# Patient Record
Sex: FEMALE | Race: WHITE | Employment: OTHER | URBAN - METROPOLITAN AREA
[De-identification: names, ages, dates, MRNs, and addresses within clinical notes are randomized per-mention and may not be internally consistent; named-entity substitution may affect disease eponyms.]

---

## 2017-05-09 PROBLEM — Z45.02 AICD AT END OF BATTERY LIFE: Status: ACTIVE | Noted: 2017-05-09

## 2017-05-09 PROBLEM — Z00.6 PATIENT IN CLINICAL RESEARCH STUDY: Status: ACTIVE | Noted: 2017-05-09

## 2018-04-05 ENCOUNTER — NURSE ONLY (OUTPATIENT)
Dept: NON INVASIVE DIAGNOSTICS | Age: 83
End: 2018-04-05

## 2018-04-05 ENCOUNTER — TELEPHONE (OUTPATIENT)
Dept: NON INVASIVE DIAGNOSTICS | Age: 83
End: 2018-04-05

## 2018-04-05 DIAGNOSIS — Z95.810 BIVENTRICULAR IMPLANTABLE CARDIOVERTER-DEFIBRILLATOR IN SITU: Primary | ICD-10-CM

## 2018-06-04 ENCOUNTER — OFFICE VISIT (OUTPATIENT)
Dept: NON INVASIVE DIAGNOSTICS | Age: 83
End: 2018-06-04
Payer: MEDICARE

## 2018-06-04 VITALS
RESPIRATION RATE: 16 BRPM | DIASTOLIC BLOOD PRESSURE: 74 MMHG | HEART RATE: 74 BPM | HEIGHT: 60 IN | SYSTOLIC BLOOD PRESSURE: 122 MMHG | BODY MASS INDEX: 32.9 KG/M2 | WEIGHT: 167.6 LBS

## 2018-06-04 DIAGNOSIS — Z95.810 BIVENTRICULAR IMPLANTABLE CARDIOVERTER-DEFIBRILLATOR IN SITU: Primary | ICD-10-CM

## 2018-06-04 DIAGNOSIS — Z00.6 PATIENT IN CLINICAL RESEARCH STUDY: ICD-10-CM

## 2018-06-04 PROBLEM — Z45.02 AICD AT END OF BATTERY LIFE: Status: RESOLVED | Noted: 2017-05-09 | Resolved: 2018-06-04

## 2018-06-04 PROCEDURE — 93290 INTERROG DEV EVAL ICPMS IP: CPT | Performed by: NURSE PRACTITIONER

## 2018-06-04 PROCEDURE — 99213 OFFICE O/P EST LOW 20 MIN: CPT | Performed by: NURSE PRACTITIONER

## 2018-06-04 PROCEDURE — 93284 PRGRMG EVAL IMPLANTABLE DFB: CPT | Performed by: NURSE PRACTITIONER

## 2018-09-11 ENCOUNTER — TELEPHONE (OUTPATIENT)
Dept: NON INVASIVE DIAGNOSTICS | Age: 83
End: 2018-09-11

## 2018-09-11 ENCOUNTER — NURSE ONLY (OUTPATIENT)
Dept: NON INVASIVE DIAGNOSTICS | Age: 83
End: 2018-09-11
Payer: MEDICARE

## 2018-09-11 DIAGNOSIS — Z95.810 BIVENTRICULAR IMPLANTABLE CARDIOVERTER-DEFIBRILLATOR IN SITU: Primary | ICD-10-CM

## 2018-09-11 DIAGNOSIS — I42.8 CARDIOMYOPATHY, NONISCHEMIC (HCC): ICD-10-CM

## 2018-09-11 PROCEDURE — 93295 DEV INTERROG REMOTE 1/2/MLT: CPT | Performed by: INTERNAL MEDICINE

## 2018-09-11 PROCEDURE — 93297 REM INTERROG DEV EVAL ICPMS: CPT | Performed by: INTERNAL MEDICINE

## 2018-09-11 PROCEDURE — 93296 REM INTERROG EVL PM/IDS: CPT | Performed by: INTERNAL MEDICINE

## 2018-11-07 LAB
CREATININE: 1 MG/DL
POTASSIUM (K+): 3.9

## 2018-12-04 NOTE — PROGRESS NOTES
regurgitation   No mitral stenosis      Tricuspid Valve   Trace tricuspid regurgitation.    RVSP is 48 mmHg.      Aortic Valve   Focal calcification of the aortic valve   Trileaflet aortic valve   No hemodynamically significant aortic stenosis is present.   Trace aortic regurgitation      Pulmonic Valve   The pulmonic valve was not well visualized.      Pericardial Effusion   No evidence of pericardial effusion.      Aorta   Aortic root dimension within normal limits.   Miscellaneous   Normal Inferior Vena Cava diameter and respiratory variation      Conclusions      Summary   Normal left ventricle size and systolic function   Stage I diastolic dysfunction   Trace mitral regurgitation   Trace aortic regurgitation   RVSP is 48 mmHg.      Signature      ----------------------------------------------------------------   Electronically signed by Kamlesh Pickett DO (Interpreting   physician) on 01/11/2017 03:50 PM   ----------------------------------------------------------------     M-Mode/2D Measurements & Calculations      LV Diastolic     LV Systolic Dimension: 3.4 cm  AV Cusp Separation: 1.8   Dimension: 4.8   LV Volume Diastolic: 257 ml    cmAO Root Dimension: 3.2   cm               LV Volume Systolic: 41.2 ml    cm   LV FS:29.2 %     LV EDV/LV EDV Index: 108 IG/89   LV PW Diastolic: AS/P^0HW ESV/LV ESV Index:   0.6 cm           47.9 ml/28ml/ m^2   LV PW Systolic:  EF Calculated: 55.7 %          RV Diastolic Dimension: 2   3.4 cm           LV Mass Index: 51 l/min*m^2    cm   Septum   Diastolic: 0.6                                  LA/Aorta: 1.22   cm               LVOT: 2 cm                     Ascending Aorta: 2.6 cm   Septum Systolic:                                LA volume/Index: 47.4 ml   1.1 cm                                          /28ml/m^2                                                   RA Area: 9.7 cm^2   LV Mass: 88.3 g                                                   IVC Expiration: 1.1

## 2018-12-05 ENCOUNTER — OFFICE VISIT (OUTPATIENT)
Dept: NON INVASIVE DIAGNOSTICS | Age: 83
End: 2018-12-05
Payer: MEDICARE

## 2018-12-05 VITALS
HEIGHT: 60 IN | BODY MASS INDEX: 32.39 KG/M2 | WEIGHT: 165 LBS | SYSTOLIC BLOOD PRESSURE: 110 MMHG | DIASTOLIC BLOOD PRESSURE: 64 MMHG | RESPIRATION RATE: 18 BRPM

## 2018-12-05 DIAGNOSIS — Z95.810 BIVENTRICULAR IMPLANTABLE CARDIOVERTER-DEFIBRILLATOR IN SITU: Primary | ICD-10-CM

## 2018-12-05 PROCEDURE — 99214 OFFICE O/P EST MOD 30 MIN: CPT | Performed by: INTERNAL MEDICINE

## 2018-12-05 PROCEDURE — 93284 PRGRMG EVAL IMPLANTABLE DFB: CPT | Performed by: INTERNAL MEDICINE

## 2018-12-05 RX ORDER — LOSARTAN POTASSIUM AND HYDROCHLOROTHIAZIDE 12.5; 5 MG/1; MG/1
1 TABLET ORAL DAILY
COMMUNITY
End: 2019-05-07 | Stop reason: SDUPTHER

## 2018-12-18 ENCOUNTER — NURSE ONLY (OUTPATIENT)
Dept: NON INVASIVE DIAGNOSTICS | Age: 83
End: 2018-12-18
Payer: MEDICARE

## 2018-12-18 DIAGNOSIS — Z95.810 BIVENTRICULAR IMPLANTABLE CARDIOVERTER-DEFIBRILLATOR IN SITU: Primary | ICD-10-CM

## 2018-12-18 DIAGNOSIS — I42.8 CARDIOMYOPATHY, NONISCHEMIC (HCC): ICD-10-CM

## 2018-12-18 PROCEDURE — 93297 REM INTERROG DEV EVAL ICPMS: CPT | Performed by: INTERNAL MEDICINE

## 2018-12-18 PROCEDURE — 93295 DEV INTERROG REMOTE 1/2/MLT: CPT | Performed by: INTERNAL MEDICINE

## 2018-12-18 PROCEDURE — 93296 REM INTERROG EVL PM/IDS: CPT | Performed by: INTERNAL MEDICINE

## 2019-01-08 ENCOUNTER — TELEPHONE (OUTPATIENT)
Dept: NON INVASIVE DIAGNOSTICS | Age: 84
End: 2019-01-08

## 2019-01-23 ENCOUNTER — OFFICE VISIT (OUTPATIENT)
Dept: CARDIOLOGY CLINIC | Age: 84
End: 2019-01-23
Payer: MEDICARE

## 2019-01-23 VITALS
HEIGHT: 60 IN | BODY MASS INDEX: 32.53 KG/M2 | RESPIRATION RATE: 16 BRPM | WEIGHT: 165.7 LBS | DIASTOLIC BLOOD PRESSURE: 68 MMHG | SYSTOLIC BLOOD PRESSURE: 118 MMHG | HEART RATE: 73 BPM

## 2019-01-23 DIAGNOSIS — I42.8 CARDIOMYOPATHY, NONISCHEMIC (HCC): Primary | ICD-10-CM

## 2019-01-23 PROCEDURE — 93000 ELECTROCARDIOGRAM COMPLETE: CPT | Performed by: INTERNAL MEDICINE

## 2019-01-23 PROCEDURE — 99213 OFFICE O/P EST LOW 20 MIN: CPT | Performed by: INTERNAL MEDICINE

## 2019-01-23 RX ORDER — BRIMONIDINE TARTRATE, TIMOLOL MALEATE 2; 5 MG/ML; MG/ML
1 SOLUTION/ DROPS OPHTHALMIC EVERY 12 HOURS
COMMUNITY
End: 2021-03-26

## 2019-03-19 ENCOUNTER — NURSE ONLY (OUTPATIENT)
Dept: NON INVASIVE DIAGNOSTICS | Age: 84
End: 2019-03-19
Payer: MEDICARE

## 2019-03-19 DIAGNOSIS — I42.8 CARDIOMYOPATHY, NONISCHEMIC (HCC): ICD-10-CM

## 2019-03-19 DIAGNOSIS — Z95.810 BIVENTRICULAR IMPLANTABLE CARDIOVERTER-DEFIBRILLATOR IN SITU: Primary | ICD-10-CM

## 2019-03-19 PROCEDURE — 93296 REM INTERROG EVL PM/IDS: CPT | Performed by: INTERNAL MEDICINE

## 2019-03-19 PROCEDURE — 93295 DEV INTERROG REMOTE 1/2/MLT: CPT | Performed by: INTERNAL MEDICINE

## 2019-03-19 PROCEDURE — 93297 REM INTERROG DEV EVAL ICPMS: CPT | Performed by: INTERNAL MEDICINE

## 2019-03-26 ENCOUNTER — TELEPHONE (OUTPATIENT)
Dept: NON INVASIVE DIAGNOSTICS | Age: 84
End: 2019-03-26

## 2019-05-07 ENCOUNTER — OFFICE VISIT (OUTPATIENT)
Dept: FAMILY MEDICINE CLINIC | Age: 84
End: 2019-05-07
Payer: MEDICARE

## 2019-05-07 ENCOUNTER — HOSPITAL ENCOUNTER (OUTPATIENT)
Age: 84
Discharge: HOME | End: 2019-05-09
Payer: MEDICARE

## 2019-05-07 VITALS
SYSTOLIC BLOOD PRESSURE: 112 MMHG | HEART RATE: 69 BPM | OXYGEN SATURATION: 97 % | BODY MASS INDEX: 32.79 KG/M2 | DIASTOLIC BLOOD PRESSURE: 70 MMHG | WEIGHT: 167 LBS | HEIGHT: 60 IN

## 2019-05-07 DIAGNOSIS — G47.01 INSOMNIA DUE TO MEDICAL CONDITION: ICD-10-CM

## 2019-05-07 DIAGNOSIS — J45.40 MODERATE PERSISTENT ASTHMA, UNSPECIFIED WHETHER COMPLICATED: ICD-10-CM

## 2019-05-07 DIAGNOSIS — I42.8 CARDIOMYOPATHY, NONISCHEMIC (HCC): Primary | ICD-10-CM

## 2019-05-07 DIAGNOSIS — E13.9 DIABETES 1.5, MANAGED AS TYPE 2 (HCC): ICD-10-CM

## 2019-05-07 DIAGNOSIS — Z95.810 BIVENTRICULAR IMPLANTABLE CARDIOVERTER-DEFIBRILLATOR IN SITU: ICD-10-CM

## 2019-05-07 DIAGNOSIS — I42.8 CARDIOMYOPATHY, NONISCHEMIC (HCC): ICD-10-CM

## 2019-05-07 LAB
ALBUMIN SERPL-MCNC: 4.2 G/DL (ref 3.5–5.2)
ALP BLD-CCNC: 82 U/L (ref 35–104)
ALT SERPL-CCNC: 10 U/L (ref 0–32)
ANION GAP SERPL CALCULATED.3IONS-SCNC: 14 MMOL/L (ref 7–16)
AST SERPL-CCNC: 20 U/L (ref 0–31)
BASOPHILS ABSOLUTE: 0.04 E9/L (ref 0–0.2)
BASOPHILS RELATIVE PERCENT: 0.7 % (ref 0–2)
BILIRUB SERPL-MCNC: 0.3 MG/DL (ref 0–1.2)
BUN BLDV-MCNC: 31 MG/DL (ref 8–23)
CALCIUM SERPL-MCNC: 10.4 MG/DL (ref 8.6–10.2)
CHLORIDE BLD-SCNC: 103 MMOL/L (ref 98–107)
CHOLESTEROL, TOTAL: 224 MG/DL (ref 0–199)
CO2: 18 MMOL/L (ref 22–29)
CREAT SERPL-MCNC: 1.2 MG/DL (ref 0.5–1)
EOSINOPHILS ABSOLUTE: 0.2 E9/L (ref 0.05–0.5)
EOSINOPHILS RELATIVE PERCENT: 3.3 % (ref 0–6)
GFR AFRICAN AMERICAN: 51
GFR NON-AFRICAN AMERICAN: 43 ML/MIN/1.73
GLUCOSE BLD-MCNC: 114 MG/DL (ref 74–99)
HBA1C MFR BLD: 6 % (ref 4–5.6)
HCT VFR BLD CALC: 38.5 % (ref 34–48)
HDLC SERPL-MCNC: 40 MG/DL
HEMOGLOBIN: 12.4 G/DL (ref 11.5–15.5)
IMMATURE GRANULOCYTES #: 0.02 E9/L
IMMATURE GRANULOCYTES %: 0.3 % (ref 0–5)
LDL CHOLESTEROL CALCULATED: 131 MG/DL (ref 0–99)
LYMPHOCYTES ABSOLUTE: 2.23 E9/L (ref 1.5–4)
LYMPHOCYTES RELATIVE PERCENT: 37.3 % (ref 20–42)
MCH RBC QN AUTO: 32 PG (ref 26–35)
MCHC RBC AUTO-ENTMCNC: 32.2 % (ref 32–34.5)
MCV RBC AUTO: 99.5 FL (ref 80–99.9)
MONOCYTES ABSOLUTE: 0.51 E9/L (ref 0.1–0.95)
MONOCYTES RELATIVE PERCENT: 8.5 % (ref 2–12)
NEUTROPHILS ABSOLUTE: 2.98 E9/L (ref 1.8–7.3)
NEUTROPHILS RELATIVE PERCENT: 49.9 % (ref 43–80)
PDW BLD-RTO: 12.7 FL (ref 11.5–15)
PLATELET # BLD: 254 E9/L (ref 130–450)
PMV BLD AUTO: 10.3 FL (ref 7–12)
POTASSIUM SERPL-SCNC: 5.2 MMOL/L (ref 3.5–5)
RBC # BLD: 3.87 E12/L (ref 3.5–5.5)
SODIUM BLD-SCNC: 135 MMOL/L (ref 132–146)
TOTAL PROTEIN: 7 G/DL (ref 6.4–8.3)
TRIGL SERPL-MCNC: 267 MG/DL (ref 0–149)
TSH SERPL DL<=0.05 MIU/L-ACNC: 2.47 UIU/ML (ref 0.27–4.2)
VLDLC SERPL CALC-MCNC: 53 MG/DL
WBC # BLD: 6 E9/L (ref 4.5–11.5)

## 2019-05-07 PROCEDURE — 82570 ASSAY OF URINE CREATININE: CPT

## 2019-05-07 PROCEDURE — 99214 OFFICE O/P EST MOD 30 MIN: CPT | Performed by: FAMILY MEDICINE

## 2019-05-07 PROCEDURE — 84443 ASSAY THYROID STIM HORMONE: CPT

## 2019-05-07 PROCEDURE — 36415 COLL VENOUS BLD VENIPUNCTURE: CPT

## 2019-05-07 PROCEDURE — 82044 UR ALBUMIN SEMIQUANTITATIVE: CPT

## 2019-05-07 PROCEDURE — 80061 LIPID PANEL: CPT

## 2019-05-07 PROCEDURE — 85025 COMPLETE CBC W/AUTO DIFF WBC: CPT

## 2019-05-07 PROCEDURE — 83036 HEMOGLOBIN GLYCOSYLATED A1C: CPT

## 2019-05-07 PROCEDURE — 80053 COMPREHEN METABOLIC PANEL: CPT

## 2019-05-07 RX ORDER — LOSARTAN POTASSIUM AND HYDROCHLOROTHIAZIDE 12.5; 5 MG/1; MG/1
1 TABLET ORAL DAILY
Qty: 90 TABLET | Refills: 1 | Status: SHIPPED | OUTPATIENT
Start: 2019-05-07 | End: 2019-11-05 | Stop reason: SDUPTHER

## 2019-05-07 RX ORDER — MIRTAZAPINE 7.5 MG/1
7.5 TABLET, FILM COATED ORAL NIGHTLY
Qty: 30 TABLET | Refills: 5 | Status: SHIPPED | OUTPATIENT
Start: 2019-05-07 | End: 2019-11-05

## 2019-05-07 RX ORDER — POTASSIUM CHLORIDE 750 MG/1
10 TABLET, EXTENDED RELEASE ORAL 3 TIMES DAILY
Qty: 90 TABLET | Refills: 5 | Status: SHIPPED
Start: 2019-05-07 | End: 2020-06-05 | Stop reason: SDUPTHER

## 2019-05-07 RX ORDER — NEBIVOLOL 2.5 MG/1
2.5 TABLET ORAL DAILY
Qty: 180 TABLET | Refills: 1 | Status: SHIPPED | OUTPATIENT
Start: 2019-05-07 | End: 2019-11-05 | Stop reason: SDUPTHER

## 2019-05-07 RX ORDER — GLIPIZIDE 10 MG/1
10 TABLET ORAL
Qty: 180 TABLET | Refills: 1 | Status: SHIPPED
Start: 2019-05-07 | End: 2020-05-20 | Stop reason: SDUPTHER

## 2019-05-07 RX ORDER — POTASSIUM CHLORIDE 750 MG/1
10 TABLET, EXTENDED RELEASE ORAL 3 TIMES DAILY
COMMUNITY
End: 2019-05-07 | Stop reason: SDUPTHER

## 2019-05-07 ASSESSMENT — PATIENT HEALTH QUESTIONNAIRE - PHQ9
2. FEELING DOWN, DEPRESSED OR HOPELESS: 0
1. LITTLE INTEREST OR PLEASURE IN DOING THINGS: 0
SUM OF ALL RESPONSES TO PHQ9 QUESTIONS 1 & 2: 0
SUM OF ALL RESPONSES TO PHQ QUESTIONS 1-9: 0
SUM OF ALL RESPONSES TO PHQ QUESTIONS 1-9: 0

## 2019-05-07 ASSESSMENT — ENCOUNTER SYMPTOMS
WHEEZING: 0
SHORTNESS OF BREATH: 0
CHEST TIGHTNESS: 0
COUGH: 0
PHOTOPHOBIA: 0

## 2019-05-07 NOTE — PROGRESS NOTES
hours, Disp: , Rfl:     ONETOUCH VERIO strip, , Disp: , Rfl:     Cholecalciferol (VITAMIN D-3) 1000 units CAPS, Take by mouth daily, Disp: , Rfl:     acetaminophen (TYLENOL ARTHRITIS PAIN) 650 MG extended release tablet, Take 650 mg by mouth every 8 hours as needed for Pain, Disp: , Rfl:     brinzolamide (AZOPT) 1 % ophthalmic suspension, Place 1 drop into both eyes 2 times daily, Disp: , Rfl:     bimatoprost (LUMIGAN) 0.01 % SOLN ophthalmic drops, Place 1 drop into both eyes nightly, Disp: , Rfl:     ibuprofen (ADVIL;MOTRIN) 800 MG tablet, Take 800 mg by mouth every 8 hours as needed , Disp: , Rfl:     Cyanocobalamin (VITAMIN B 12 PO), Take 1,000 mcg by mouth , Disp: , Rfl:     Biotin 1000 MCG TABS, Take 5,000 mcg by mouth daily , Disp: , Rfl:     vitamin E 400 UNIT capsule, Take 400 Units by mouth daily. , Disp: , Rfl:     Netarsudil Dimesylate (RHOPRESSA OP), Apply to eye, Disp: , Rfl:     brimonidine (ALPHAGAN P) 0.15 % ophthalmic solution, Place 1 drop into both eyes 2 times daily, Disp: , Rfl:   Allergies   Allergen Reactions    Codeine     Statins        Past Medical History:   Diagnosis Date    Arthritis     Asthma     CAD (coronary artery disease)     Diabetes mellitus ([address]     GERD (gastroesophageal reflux disease)     Hyperlipidemia     Hypertension     ICD (implantable cardiac defibrillator) battery depletion     Renell Artur (biventricular)    LBBB (left bundle branch block)     Nonischemic cardiomyopathy ([address]      Patient Active Problem List   Diagnosis    Cardiomyopathy, nonischemic (HCC)    Bundle branch block, left    Asthma    Biventricular implantable cardioverter-defibrillator in situ    CAD (coronary artery disease)    Diabetes 1.5, managed as type 2 ([address]    Patient in clinical research study    Insomnia due to medical condition     Past Surgical History:   Procedure Laterality Date    CARDIAC DEFIBRILLATOR PLACEMENT  09/01/2011    CARDIAC DEFIBRILLATOR PLACEMENT Left 05/09/2017    Bi-V gen change, [employer], WRAP-IT study,     CARPAL TUNNEL RELEASE      right arm    CATARACT REMOVAL WITH IMPLANT  2010    DIAGNOSTIC CARDIAC CATH LAB PROCEDURE  3/23/10    Dr. Mayur Robles    left eye     Family History   Problem Relation Age of Onset    Cancer Father      Social History     Tobacco History     Smoking Status  Never Smoker    Smokeless Tobacco Use  Never Used          Alcohol History     Alcohol Use Status  No          Drug Use     Drug Use Status  No          Sexual Activity     Sexually Active  Not Asked                    OBJECTIVE  Vitals:    05/07/19 0930   BP: 112/70   Pulse: 69   SpO2: 97%   Body mass index is 32.61 kg/m². EXAM   Physical Exam   Constitutional: She is oriented to person, place, and time. She appears well-developed. obese   HENT:   Head: Normocephalic. Right Ear: External ear normal.   Hearing aids missing left one   Eyes: Pupils are equal, round, and reactive to light. Conjunctivae are normal.   Neck: Normal range of motion. No JVD present. No tracheal deviation present. No thyromegaly present. Cardiovascular: Normal rate, regular rhythm, normal heart sounds and intact distal pulses. Pacemaker/defibrillator   Pulmonary/Chest: Effort normal. She has no wheezes. She has no rales. Abdominal: Soft. Bowel sounds are normal. There is no tenderness. There is no guarding. Musculoskeletal: Normal range of motion. She exhibits no deformity. OA right knee in particular   Neurological: She is alert and oriented to person, place, and time. A sensory deficit is present. No cranial nerve deficit. She exhibits normal muscle tone. Skin: Skin is warm. Capillary refill takes less than 2 seconds. No rash noted. Psychiatric: She has a normal mood and affect. Her behavior is normal. Thought content normal.       [place] was seen today for check-up and hypertension.     Diagnoses and all orders for this visit:    Cardiomyopathy, nonischemic (HCC)  -     losartan-hydrochlorothiazide (HYZAAR) 50-12.5 MG per tablet; Take 1 tablet by mouth daily  -     nebivolol (BYSTOLIC) 2.5 MG tablet; Take 1 tablet by mouth daily In addition to 5 mg for total of 7.5 mg  -     glipiZIDE (GLUCOTROL) 10 MG tablet; Take 1 tablet by mouth 2 times daily (before meals)  -     potassium chloride (KLOR-CON M) 10 MEQ extended release tablet; Take 1 tablet by mouth 3 times daily 3 tablets by mouth every day  -     metFORMIN (GLUCOPHAGE) 500 MG tablet; Take 1 tablet by mouth 2 times daily (with meals) One tab qam and two tab qpm  (Doesn't always take 2 in pm)  -     CBC Auto Differential; Future  -     Comprehensive Metabolic Panel; Future  -     Hemoglobin A1C; Future  -     Lipid Panel; Future  -     TSH; Future  -     Microalbumin / Creatinine Urine Ratio; Future    Moderate persistent asthma, unspecified whether complicated  Well compensated, no changes made    Diabetes 1.5, managed as type 2 (HCC)  -     losartan-hydrochlorothiazide (HYZAAR) 50-12.5 MG per tablet; Take 1 tablet by mouth daily  -     nebivolol (BYSTOLIC) 2.5 MG tablet; Take 1 tablet by mouth daily In addition to 5 mg for total of 7.5 mg  -     glipiZIDE (GLUCOTROL) 10 MG tablet; Take 1 tablet by mouth 2 times daily (before meals)  -     potassium chloride (KLOR-CON M) 10 MEQ extended release tablet; Take 1 tablet by mouth 3 times daily 3 tablets by mouth every day  -     metFORMIN (GLUCOPHAGE) 500 MG tablet; Take 1 tablet by mouth 2 times daily (with meals) One tab qam and two tab qpm  (Doesn't always take 2 in pm)  -     CBC Auto Differential; Future  -     Comprehensive Metabolic Panel; Future  -     Hemoglobin A1C; Future  -     Lipid Panel; Future  -     TSH; Future  -     Microalbumin / Creatinine Urine Ratio; Future  Complete BW ordered. Sees Dr. Flakita Garay for eyes, and has Podiatrist she sees regularly.  Encouraged to get diabetic shoes    Biventricular implantable

## 2019-05-07 NOTE — PATIENT INSTRUCTIONS
Take zantac with evening meal for 1 mos then as need. Elevate head on wedge or 2 pillows.  Mirtazapine at hs 7.5 mg x 1 week then prn

## 2019-05-08 LAB
CREATININE URINE: 90 MG/DL (ref 29–226)
MICROALBUMIN UR-MCNC: <12 MG/L
MICROALBUMIN/CREAT UR-RTO: ABNORMAL (ref 0–30)

## 2019-05-24 ENCOUNTER — OFFICE VISIT (OUTPATIENT)
Dept: FAMILY MEDICINE CLINIC | Age: 84
End: 2019-05-24
Payer: MEDICARE

## 2019-05-24 ENCOUNTER — HOSPITAL ENCOUNTER (OUTPATIENT)
Age: 84
Discharge: HOME | End: 2019-05-26
Payer: MEDICARE

## 2019-05-24 VITALS
OXYGEN SATURATION: 98 % | DIASTOLIC BLOOD PRESSURE: 70 MMHG | SYSTOLIC BLOOD PRESSURE: 130 MMHG | BODY MASS INDEX: 31.64 KG/M2 | WEIGHT: 162 LBS | TEMPERATURE: 97.8 F | HEART RATE: 80 BPM

## 2019-05-24 DIAGNOSIS — R30.0 DYSURIA: Primary | ICD-10-CM

## 2019-05-24 DIAGNOSIS — N30.00 ACUTE CYSTITIS WITHOUT HEMATURIA: ICD-10-CM

## 2019-05-24 LAB
BILIRUBIN, POC: ABNORMAL
BLOOD URINE, POC: ABNORMAL
CLARITY, POC: ABNORMAL
COLOR, POC: YELLOW
GLUCOSE URINE, POC: ABNORMAL
KETONES, POC: ABNORMAL
LEUKOCYTE EST, POC: ABNORMAL
NITRITE, POC: POSITIVE
PH, POC: 5.5
PROTEIN, POC: 100
SPECIFIC GRAVITY, POC: 1.03
UROBILINOGEN, POC: 0.2

## 2019-05-24 PROCEDURE — 87186 SC STD MICRODIL/AGAR DIL: CPT

## 2019-05-24 PROCEDURE — 81002 URINALYSIS NONAUTO W/O SCOPE: CPT | Performed by: FAMILY MEDICINE

## 2019-05-24 PROCEDURE — 99213 OFFICE O/P EST LOW 20 MIN: CPT | Performed by: FAMILY MEDICINE

## 2019-05-24 PROCEDURE — 87077 CULTURE AEROBIC IDENTIFY: CPT

## 2019-05-24 PROCEDURE — 87088 URINE BACTERIA CULTURE: CPT

## 2019-05-24 RX ORDER — AMOXICILLIN 875 MG/1
875 TABLET, COATED ORAL 2 TIMES DAILY
Qty: 20 TABLET | Refills: 0 | Status: SHIPPED | OUTPATIENT
Start: 2019-05-24 | End: 2019-06-03

## 2019-05-24 NOTE — PROGRESS NOTES
5/24/19    Name: Jose Pinto  ARK:[birth year]   Sex:female   Age:86 y.o. Subjective:  Chief Complaint   Patient presents with    Dysuria     1 week with urgency and incontinence      Patient present with urinary complaints    Started Monday but she waited to see what would happen  Tried drinking more water  Took azo mon , Tuesday and wed and it helped a little    But yesterday and today still frequency and urgency, cannot always make it to the bathroom  Burns when she is done peeing and chills a well        Urinary Frequency    This is a new problem. The current episode started in the past 7 days. The problem occurs intermittently. The problem has been gradually worsening. The quality of the pain is described as aching. The pain is at a severity of 3/10. The pain is mild. There has been no fever. She is not sexually active. There is no history of pyelonephritis. Associated symptoms include chills, frequency, hesitancy and urgency. Treatments tried: tried azo. The treatment provided no relief. ROS:    As above, all other pertinent systems negative.         Current Outpatient Medications:     amoxicillin (AMOXIL) 875 MG tablet, Take 1 tablet by mouth 2 times daily for 10 days, Disp: 20 tablet, Rfl: 0    losartan-hydrochlorothiazide (HYZAAR) 50-12.5 MG per tablet, Take 1 tablet by mouth daily, Disp: 90 tablet, Rfl: 1    nebivolol (BYSTOLIC) 2.5 MG tablet, Take 1 tablet by mouth daily In addition to 5 mg for total of 7.5 mg, Disp: 180 tablet, Rfl: 1    glipiZIDE (GLUCOTROL) 10 MG tablet, Take 1 tablet by mouth 2 times daily (before meals), Disp: 180 tablet, Rfl: 1    potassium chloride (KLOR-CON M) 10 MEQ extended release tablet, Take 1 tablet by mouth 3 times daily 3 tablets by mouth every day, Disp: 90 tablet, Rfl: 5    metFORMIN (GLUCOPHAGE) 500 MG tablet, Take 1 tablet by mouth 2 times daily (with meals) One tab qam and two tab qpm (Doesn't always take 2 in pm), Disp: 60 tablet, Rfl: 5   mirtazapine (REMERON) 7.5 MG tablet, Take 1 tablet by mouth nightly, Disp: 30 tablet, Rfl: 5    Handicap Placard MISC, by Does not apply route, Disp: , Rfl:     Netarsudil Dimesylate (RHOPRESSA OP), Apply to eye, Disp: , Rfl:     brimonidine-timolol (COMBIGAN) 0.2-0.5 % ophthalmic solution, Place 1 drop into both eyes every 12 hours, Disp: , Rfl:     ONETOUCH VERIO strip, , Disp: , Rfl:     Cholecalciferol (VITAMIN D-3) 1000 units CAPS, Take by mouth daily, Disp: , Rfl:     acetaminophen (TYLENOL ARTHRITIS PAIN) 650 MG extended release tablet, Take 650 mg by mouth every 8 hours as needed for Pain, Disp: , Rfl:     brinzolamide (AZOPT) 1 % ophthalmic suspension, Place 1 drop into both eyes 2 times daily, Disp: , Rfl:     bimatoprost (LUMIGAN) 0.01 % SOLN ophthalmic drops, Place 1 drop into both eyes nightly, Disp: , Rfl:     brimonidine (ALPHAGAN P) 0.15 % ophthalmic solution, Place 1 drop into both eyes 2 times daily, Disp: , Rfl:     ibuprofen (ADVIL;MOTRIN) 800 MG tablet, Take 800 mg by mouth every 8 hours as needed , Disp: , Rfl:     Cyanocobalamin (VITAMIN B 12 PO), Take 1,000 mcg by mouth , Disp: , Rfl:     Biotin 1000 MCG TABS, Take 5,000 mcg by mouth daily , Disp: , Rfl:     vitamin E 400 UNIT capsule, Take 400 Units by mouth daily. , Disp: , Rfl:   Allergies   Allergen Reactions    Codeine     Statins         /70   Pulse 80   Temp 97.8 °F (36.6 °C)   Wt 162 lb (73.5 kg)   SpO2 98%   BMI 31.64 kg/m²     EXAM:   Physical Exam   Constitutional: She is oriented to person, place, and time. She appears well-developed and well-nourished. HENT:   Head: Normocephalic and atraumatic. Eyes: Pupils are equal, round, and reactive to light. EOM are normal.   Neck: Normal range of motion. Cardiovascular: Normal rate and regular rhythm. Pulmonary/Chest: Effort normal and breath sounds normal.   Abdominal: Soft.  Bowel sounds are normal.   Musculoskeletal:   Walks with a 4 prong cane Neurological: She is alert and oriented to person, place, and time. Skin: Skin is warm and dry. Nursing note and vitals reviewed. [place] was seen today for dysuria. Diagnoses and all orders for this visit:    Dysuria  -     POCT Urinalysis no Micro  -     Urine Culture    Acute cystitis without hematuria  -     amoxicillin (AMOXIL) 875 MG tablet; Take 1 tablet by mouth 2 times daily for 10 days  -     POCT Urinalysis no Micro  -     Urine Culture    will up date her once the urine cx comes back  If not feeling better in 2 to 3 days needs to follow up        Seen by:   Milagros Miller DO

## 2019-05-26 LAB
ORGANISM: ABNORMAL
URINE CULTURE, ROUTINE: ABNORMAL
URINE CULTURE, ROUTINE: ABNORMAL

## 2019-06-03 ENCOUNTER — TELEPHONE (OUTPATIENT)
Dept: FAMILY MEDICINE CLINIC | Age: 84
End: 2019-06-03

## 2019-06-04 NOTE — PROGRESS NOTES
[facility] Cardiac Electrophysiology Office Progress Note    Huong Shruthi  9/21/1932  Date of Service: 6/5/19   PCP: Kevin Maher MD  Cardiologist: Genet Jean DO  Electrophysiologist: Marcial Fry DO    Patient Active Problem List    Diagnosis Date Noted    Insomnia due to medical condition 05/07/2019    Patient in clinical research study 05/09/2017     Overview Note:     WRAP-IT: Enrolled--5/9/2017  Closed      Diabetes 1.5, managed as type 2 ([facility] [address] 03/28/2012    CAD (coronary artery disease)      Overview Note:       A. Cardia catheterization (3/23/10 by Dr. Hilda Munoz). D1 - 30-40%  Ramus - proximal 30-40%  RCA - ostial 40%      Biventricular implantable cardioverter-defibrillator in situ 09/10/2011     Overview Note:     A. Medtronic Protecta XT CRT/D, model [id number], serial # [id number] implanted 9/1/11  B. Rght atrial lead is a Medtronic, model # [id number], serial # [id number]  C. Right ventricular lead is a Medtronic model # [id number], serial [id number]           [facility] 08/18/2011     Overview Note:     A. S/P 2-D echocardiogram 6/16/11: LVEF 25%. Stage l diastolic dysfunction. Mild mitral, tricuspid and aortic regurgitation. Moderately increased LA volume  B. S/P 2=D echocardiogram 11/1910:  LVEF 35-40%  C. S/P adenosine stress test 11/20/10: no ischemia  D.  S/P cardiac catheterization 11/23/10: LVEF 30%; minimal coronary artery disease  E. 2-12 EF improved to 60% by echo & 57% by MUGA in 3-12      Bundle branch block, left 08/18/2011    Asthma 08/18/2011       Current Outpatient Medications   Medication Sig Dispense Refill    losartan-hydrochlorothiazide (HYZAAR) 50-12.5 MG per tablet Take 1 tablet by mouth daily 90 tablet 1    nebivolol (BYSTOLIC) 2.5 MG tablet Take 1 tablet by mouth daily In addition to 5 mg for total of 7.5 mg 180 tablet 1    glipiZIDE (GLUCOTROL) 10 MG tablet Take 1 tablet by mouth 2 times daily (before meals) 180 tablet 1    potassium chloride (KLOR-CON M) 10 MEQ extended release tablet Take 1 tablet by mouth 3 times daily 3 tablets by mouth every day 90 tablet 5    metFORMIN (GLUCOPHAGE) 500 MG tablet Take 1 tablet by mouth 2 times daily (with meals) One tab qam and two tab qpm  (Doesn't always take 2 in pm) 60 tablet 5    mirtazapine (REMERON) 7.5 MG tablet Take 1 tablet by mouth nightly 30 tablet 5    Handicap Placard MISC by Does not apply route      Netarsudil Dimesylate (RHOPRESSA OP) Apply to eye      brimonidine-timolol (COMBIGAN) 0.2-0.5 % ophthalmic solution Place 1 drop into both eyes every 12 hours      ONETOUCH VERIO strip       Cholecalciferol (VITAMIN D-3) 1000 units CAPS Take by mouth daily      acetaminophen (TYLENOL ARTHRITIS PAIN) 650 MG extended release tablet Take 650 mg by mouth every 8 hours as needed for Pain      brinzolamide (AZOPT) 1 % ophthalmic suspension Place 1 drop into both eyes 2 times daily      bimatoprost (LUMIGAN) 0.01 % SOLN ophthalmic drops Place 1 drop into both eyes nightly      brimonidine (ALPHAGAN P) 0.15 % ophthalmic solution Place 1 drop into both eyes 2 times daily      ibuprofen (ADVIL;MOTRIN) 800 MG tablet Take 800 mg by mouth every 8 hours as needed       Cyanocobalamin (VITAMIN B 12 PO) Take 1,000 mcg by mouth       Biotin 1000 MCG TABS Take 5,000 mcg by mouth daily       vitamin E 400 UNIT capsule Take 400 Units by mouth daily. No current facility-administered medications for this visit. Allergies   Allergen Reactions    Codeine     Statins        SUBJECTIVE: [address] presents to the office today for the management of: ICD in situ, NICMP. She presents for her 6 month office follow-up. She follows with Dr Lisa Jose from a Cardiology perspective with her most recent follow-up 1/23/19. She was doing well with no changes. She offers no complaints today from an EP perspective but is getting over a UTI. She had not been feeling well for a few weeks.  She has completed her antibiotic therapy. She has had no significant arrhythmias on her device interrogation. She is enrolled in remote monitoring. She denies any HF symptoms and appears euvolemic. Her OptiVol fluid index is stable. She is BiV paced 98%. She denies angina, dyspnea, syncope, orthopnea and PND. She also denies ICD shock. Review of Systems   Respiratory: Negative for chest tightness and shortness of breath. Cardiovascular: Negative for chest pain, palpitations and leg swelling. Neurological: Negative for dizziness, syncope and light-headedness. All other systems reviewed and are negative. PHYSICAL EXAM:  Vitals:    06/05/19 1007   Resp: 20   Weight: 165 lb (74.8 kg)   Height: 5' (1.524 m)     Constitutional: Oriented to person, place, and time. Well-developed and cooperative. Head: Normocephalic and atraumatic. Eyes: Conjunctivae are normal.   Neck: No hepatojugular reflux and no JVD present. Cardiovascular: S1 normal, S2 normal and intact distal pulses. Normal rate and rhythm. PMI is not displaced. Pulmonary/Chest: Effort normal and breath sounds normal. No respiratory distress. Abdominal: Soft. No tenderness. Musculoskeletal: Normal range of motion of all extremities, no muscle weakness. Neurological: Alert and oriented to person, place, and time. Gait normal.   Skin: Skin is warm and dry. No bruising, no ecchymosis and no rash noted. Extremity: No clubbing or cyanosis. No edema. Psychiatric: Normal mood and affect. Thought content normal.   ICD site: stable, well healed, no evidence of erosion    2D echocardiogram 1/11/17:     TTE procedure:Echo Complete W/ Dop & Color Flow.     Procedure Date  Date: 01/11/2017 Start: 02:11 PM    Study Location: Echo Lab  Technical Quality: Adequate visualization    Indications:Cardiomyopathy, Left bundle branch block and Dyspnea/SOB.     Patient Status: Routine    Height: 60 inches Weight: 165 pounds BSA: 1.72 m^2 BMI: 32.22 kg/m^2    BP: 110/64 mmHg     Findings      Left Ventricle   Normal left ventricle size and systolic function   Stage I diastolic dysfunction   No wall motion abnormalities   Ejection fraction is visually estimated at 60%.     Right Ventricle   Normal right ventricle structure and function.      Left Atrium   Normal sized left atrium.      Right Atrium   Normal right atrium size.      Mitral Valve   Mild mitral annular calcification   Trace mitral regurgitation   No mitral stenosis      Tricuspid Valve   Trace tricuspid regurgitation.    RVSP is 48 mmHg.      Aortic Valve   Focal calcification of the aortic valve   Trileaflet aortic valve   No hemodynamically significant aortic stenosis is present.   Trace aortic regurgitation      Pulmonic Valve   The pulmonic valve was not well visualized.      Pericardial Effusion   No evidence of pericardial effusion.      Aorta   Aortic root dimension within normal limits.   Miscellaneous   Normal Inferior Vena Cava diameter and respiratory variation      Conclusions      Summary   Normal left ventricle size and systolic function   Stage I diastolic dysfunction   Trace mitral regurgitation   Trace aortic regurgitation   RVSP is 48 mmHg.      Signature      ----------------------------------------------------------------   Electronically signed by Loyd Schulz DO (Interpreting   physician) on 01/11/2017 03:50 PM   ----------------------------------------------------------------     M-Mode/2D Measurements & Calculations      LV Diastolic     LV Systolic Dimension: 3.4 cm  AV Cusp Separation: 1.8   Dimension: 4.8   LV Volume Diastolic: 064 ml    cmAO Root Dimension: 3.2   cm               LV Volume Systolic: 26.8 ml    cm   LV FS:29.2 %     LV EDV/LV EDV Index: 108 YP/08   LV PW Diastolic: DP/W^9FA ESV/LV ESV Index:   0.6 cm           47.9 ml/28ml/ m^2   LV PW Systolic:  EF Calculated: 55.7 %          RV Diastolic Dimension: 2   8.5 cm           LV Mass Index: 51 l/min*m^2    cm   Septum   Diastolic: 0.6                                  LA/Aorta: 1.22   cm               LVOT: 2 cm                     Ascending Aorta: 2.6 cm   Septum Systolic:                                LA volume/Index: 47.4 ml   1.1 cm                                          /28ml/m^2                                                   RA Area: 9.7 cm^2   LV Mass: 88.3 g                                                   IVC Expiration: 1.1 cm     Doppler Measurements & Calculations      MV Peak E-Wave:   AV Peak Velocity: 1.38 m/s    LVOT Peak Velocity: 0.8   0.61 m/s          AV Peak Gradient: 7.65 mmHg   m/s   MV Peak A-Wave:   AV Mean Velocity: 0.9 m/s     LVOT Mean Velocity: 0.5   0.89 m/s          AV Mean Gradient: 3.8 mmHg    m/s   MV E/A Ratio:     AV VTI: 29.8 cm               LVOT Peak Gradient: 2.5   0.69              AV Area (Continuity):1.79     mmHgLVOT Mean Gradient:   MV Peak Gradient: cm^2                          1.2 mmHg   3.8 mmHg          AV Deceleration Time: 3316    Estimated RVSP: 47.8 mmHg   MV Mean Gradient: msec                          Estimated RAP:3 mmHg   1.4 mmHg          LVOT VTI: 17 cm   MV Mean Velocity: AV P1/2t: 962 msec   0.57 m/s          IVRT: 99.2 msec               TR Velocity:3.35 m/s   MV Deceleration                                 TR Gradient:44.84 mmHg   Time: 277.6 msec  Pulm. Vein A Reversal         PV Peak Velocity: 0.76 m/s   MV P1/2t: 83.5    Duration:115.3 msec           PV Peak Gradient: 2.33   msec              Pulm. Vein D Velocity:0.34    mmHg   MVA by PHT:2.63   m/sPulm. Vein A Reversal      PV Mean Velocity: 0.48 m/s   cm^2              Velocity:0.11 m/s             PV Mean Gradient: 1.1 mmHg   MV Area           Pulm.  Vein S Velocity: 0.61   (continuity): 2.3 m/s   cm^2   MV E' Septal   Velocity: 0.04   m/s   MV E' Lateral   Velocity: 0.07   m/s   MR Velocity: 3.86   m/s   MR VTI: 114.4 cm     Device Interrogation/Reprogramming  Make/Model  boo-box MRI CRT-D. DOI 5/9/17  Mode DDD 60/130 ppm  P wave: 2.6 mV  Impedance: 513 ohms   Threshold: 0.75 V @ 0.4 ms  RV R wave: 10.8 mV  Impedance: 456 ohms   Threshold: 0.75 V @ 0.4 ms  LV:  Impedance: 760 ohms   Threshold:2V @ 0.9 ms (Tip to coil)  Pacing: A: 1.2%  RV: 98.2%  LV: 98.2%  Battery Voltage/Longevity: 6.2 years   Arrhythmias: None  OptiVol fluid index: Stable  Overall device function is normal  All device programmable settings were evaluated per above and in the scanned document, along with iterative adjustments (capture thresholds) to assess and select the most appropriate final programming to provide for consistent delivery of the appropriate therapy and to verify function of the device. I have independently reviewed all of the ECGs as per above. I have reviewed all progress notes & records from the time of the patient's last office visit up to present. Impressions:    1. NICMP  A. S/P 2-D echocardiogram 6/16/11: LVEF 25%. Stage l diastolic dysfunction. Mild mitral, tricuspid and aortic regurgitation. Moderately increased LA volume  B. S/P 2=D echocardiogram 11/1910:  LVEF 35-40%  C. S/P adenosine stress test 11/20/10: no ischemia  D. S/P cardiac catheterization 11/23/10: LVEF 30%; minimal coronary artery disease  E. 2-12 EF improved to 60% by echo & 57% by MUGA in 3-12  F. S/P 2D echocardiogram 1/11/17:  Ejection fraction is visually estimated at 60%. 2. cLBBB    3. CRT-D in situ  A. Medtronic Protecta XT CRT/D, model [id number], serial # [id number] implanted 9/1/11  B. S/P ICD generator change: Medtronic ClariA MRI CRT-D. DOI 5/9/17    4. CAD  A. Cardia catheterization (3/23/10 by Dr. Britton Lee). D1 - 30-40%  Ramus - proximal 30-40%  RCA - ostial 40%    5. Type II DM  Lab Results   Component Value Date    LABA1C 6.0 (H) 05/07/2019     No results found for: EAG    6. H/O asthma  A. No recent exacerbations    7. Recent UTI  - completed antibiotic therapy    Summary:     1.  Ms Groves's CRT-D function is stable and programmed accordingly based on the above interrogation. She denies any HF symptoms and appears euvolemic today. Her OptiVol fluid index is stable. She is BiV paced 98%. There have been no significant arrhythmias on her device interrogation today. 2. She will send a remote transmission in 91 days. 3. Office follow-up and device interrogation in 6 months. 4. No changes were made in her medications today. 5. She was asked to call the office with concerns prior to her follow-up. Thank you for allowing me to participate in their care. I have spent a total of 20 minutes with the patient reviewing the above stated recommendations.  And a total of >50% of that time involved face-to-face time providing counseling and or coordination of care with the other providers    CANDIDA Gallo, [address] [facility]    CC: Dr Jesica Galindo

## 2019-06-05 ENCOUNTER — OFFICE VISIT (OUTPATIENT)
Dept: NON INVASIVE DIAGNOSTICS | Age: 84
End: 2019-06-05
Payer: MEDICARE

## 2019-06-05 VITALS
BODY MASS INDEX: 32.39 KG/M2 | DIASTOLIC BLOOD PRESSURE: 68 MMHG | HEART RATE: 75 BPM | SYSTOLIC BLOOD PRESSURE: 110 MMHG | HEIGHT: 60 IN | WEIGHT: 165 LBS | RESPIRATION RATE: 20 BRPM

## 2019-06-05 DIAGNOSIS — Z95.810 BIVENTRICULAR IMPLANTABLE CARDIOVERTER-DEFIBRILLATOR IN SITU: Primary | ICD-10-CM

## 2019-06-05 PROCEDURE — 99213 OFFICE O/P EST LOW 20 MIN: CPT | Performed by: NURSE PRACTITIONER

## 2019-06-05 PROCEDURE — 93290 INTERROG DEV EVAL ICPMS IP: CPT | Performed by: NURSE PRACTITIONER

## 2019-06-05 PROCEDURE — 93284 PRGRMG EVAL IMPLANTABLE DFB: CPT | Performed by: NURSE PRACTITIONER

## 2019-06-05 ASSESSMENT — ENCOUNTER SYMPTOMS
SHORTNESS OF BREATH: 0
CHEST TIGHTNESS: 0

## 2019-06-05 NOTE — TELEPHONE ENCOUNTER
Per [facility] pt was taking 3 tabs of 2.5mg to equal 7.5mg qd. I do not see that we sent a Rx for 5mg. Okay to tell pharmacy to fill 2.5mg 1 po tid #90? I tried both numbers to reach pt but she did not answer. I did not leave a VM.

## 2019-06-12 DIAGNOSIS — E13.9 DIABETES 1.5, MANAGED AS TYPE 2 (HCC): ICD-10-CM

## 2019-06-12 DIAGNOSIS — I42.8 CARDIOMYOPATHY, NONISCHEMIC (HCC): ICD-10-CM

## 2019-06-18 ENCOUNTER — NURSE ONLY (OUTPATIENT)
Dept: NON INVASIVE DIAGNOSTICS | Age: 84
End: 2019-06-18
Payer: MEDICARE

## 2019-06-18 DIAGNOSIS — I42.8 CARDIOMYOPATHY, NONISCHEMIC (HCC): ICD-10-CM

## 2019-06-18 DIAGNOSIS — Z95.810 BIVENTRICULAR IMPLANTABLE CARDIOVERTER-DEFIBRILLATOR IN SITU: Primary | ICD-10-CM

## 2019-06-18 PROCEDURE — 93295 DEV INTERROG REMOTE 1/2/MLT: CPT | Performed by: INTERNAL MEDICINE

## 2019-06-18 PROCEDURE — 93296 REM INTERROG EVL PM/IDS: CPT | Performed by: INTERNAL MEDICINE

## 2019-07-01 ENCOUNTER — TELEPHONE (OUTPATIENT)
Dept: NON INVASIVE DIAGNOSTICS | Age: 84
End: 2019-07-01

## 2019-07-23 RX ORDER — BLOOD SUGAR DIAGNOSTIC
1 STRIP MISCELLANEOUS DAILY
Qty: 100 EACH | Refills: 5 | Status: SHIPPED
Start: 2019-07-23 | End: 2021-06-30

## 2019-09-17 ENCOUNTER — NURSE ONLY (OUTPATIENT)
Dept: NON INVASIVE DIAGNOSTICS | Age: 84
End: 2019-09-17
Payer: MEDICARE

## 2019-09-17 DIAGNOSIS — I42.8 CARDIOMYOPATHY, NONISCHEMIC (HCC): ICD-10-CM

## 2019-09-17 DIAGNOSIS — Z95.810 BIVENTRICULAR IMPLANTABLE CARDIOVERTER-DEFIBRILLATOR IN SITU: Primary | ICD-10-CM

## 2019-09-17 PROCEDURE — 93297 REM INTERROG DEV EVAL ICPMS: CPT | Performed by: INTERNAL MEDICINE

## 2019-09-17 PROCEDURE — 93295 DEV INTERROG REMOTE 1/2/MLT: CPT | Performed by: INTERNAL MEDICINE

## 2019-09-17 PROCEDURE — 93296 REM INTERROG EVL PM/IDS: CPT | Performed by: INTERNAL MEDICINE

## 2019-09-27 DIAGNOSIS — M15.9 PRIMARY OSTEOARTHRITIS INVOLVING MULTIPLE JOINTS: Primary | ICD-10-CM

## 2019-09-27 RX ORDER — IBUPROFEN 800 MG/1
800 TABLET ORAL DAILY PRN
Qty: 30 TABLET | Refills: 1 | Status: SHIPPED
Start: 2019-09-27 | End: 2020-10-21 | Stop reason: SDUPTHER

## 2019-10-01 ENCOUNTER — TELEPHONE (OUTPATIENT)
Dept: NON INVASIVE DIAGNOSTICS | Age: 84
End: 2019-10-01

## 2019-11-05 ENCOUNTER — OFFICE VISIT (OUTPATIENT)
Dept: FAMILY MEDICINE CLINIC | Age: 84
End: 2019-11-05
Payer: MEDICARE

## 2019-11-05 VITALS
OXYGEN SATURATION: 99 % | SYSTOLIC BLOOD PRESSURE: 124 MMHG | HEIGHT: 60 IN | DIASTOLIC BLOOD PRESSURE: 76 MMHG | TEMPERATURE: 97 F | BODY MASS INDEX: 31.77 KG/M2 | WEIGHT: 161.8 LBS | HEART RATE: 75 BPM

## 2019-11-05 DIAGNOSIS — E13.9 DIABETES 1.5, MANAGED AS TYPE 2 (HCC): ICD-10-CM

## 2019-11-05 DIAGNOSIS — Z23 IMMUNIZATION DUE: Primary | ICD-10-CM

## 2019-11-05 DIAGNOSIS — I42.8 CARDIOMYOPATHY, NONISCHEMIC (HCC): ICD-10-CM

## 2019-11-05 PROBLEM — J45.40 MODERATE PERSISTENT ASTHMA WITHOUT COMPLICATION: Status: ACTIVE | Noted: 2019-11-05

## 2019-11-05 LAB — HBA1C MFR BLD: 6 %

## 2019-11-05 PROCEDURE — 99214 OFFICE O/P EST MOD 30 MIN: CPT | Performed by: FAMILY MEDICINE

## 2019-11-05 PROCEDURE — 83036 HEMOGLOBIN GLYCOSYLATED A1C: CPT | Performed by: FAMILY MEDICINE

## 2019-11-05 PROCEDURE — G0008 ADMIN INFLUENZA VIRUS VAC: HCPCS | Performed by: FAMILY MEDICINE

## 2019-11-05 PROCEDURE — 90653 IIV ADJUVANT VACCINE IM: CPT | Performed by: FAMILY MEDICINE

## 2019-11-05 RX ORDER — NEBIVOLOL 2.5 MG/1
2.5 TABLET ORAL DAILY
Qty: 180 TABLET | Refills: 1 | Status: SHIPPED
Start: 2019-11-05 | End: 2020-04-06 | Stop reason: SDUPTHER

## 2019-11-05 RX ORDER — LOSARTAN POTASSIUM AND HYDROCHLOROTHIAZIDE 12.5; 5 MG/1; MG/1
1 TABLET ORAL DAILY
Qty: 90 TABLET | Refills: 1 | Status: SHIPPED
Start: 2019-11-05 | End: 2020-05-12

## 2019-11-05 RX ORDER — NETARSUDIL 0.2 MG/ML
SOLUTION/ DROPS OPHTHALMIC; TOPICAL
COMMUNITY
Start: 2019-08-24 | End: 2021-03-26

## 2019-11-05 ASSESSMENT — ENCOUNTER SYMPTOMS
CHEST TIGHTNESS: 0
BLOOD IN STOOL: 0
ABDOMINAL PAIN: 0
WHEEZING: 0
EYES NEGATIVE: 1
VOMITING: 0
DIARRHEA: 0
COUGH: 0
CONSTIPATION: 0

## 2019-11-06 ENCOUNTER — TELEPHONE (OUTPATIENT)
Dept: FAMILY MEDICINE CLINIC | Age: 84
End: 2019-11-06

## 2019-11-11 ENCOUNTER — TELEPHONE (OUTPATIENT)
Dept: ADMINISTRATIVE | Age: 84
End: 2019-11-11

## 2019-11-11 ENCOUNTER — OFFICE VISIT (OUTPATIENT)
Dept: FAMILY MEDICINE CLINIC | Age: 84
End: 2019-11-11
Payer: MEDICARE

## 2019-11-11 VITALS — HEART RATE: 76 BPM | OXYGEN SATURATION: 98 % | TEMPERATURE: 98.1 F

## 2019-11-11 DIAGNOSIS — A09 DIARRHEA OF INFECTIOUS ORIGIN: Primary | ICD-10-CM

## 2019-11-11 PROCEDURE — 99213 OFFICE O/P EST LOW 20 MIN: CPT | Performed by: FAMILY MEDICINE

## 2019-12-10 ENCOUNTER — OFFICE VISIT (OUTPATIENT)
Dept: NON INVASIVE DIAGNOSTICS | Age: 84
End: 2019-12-10
Payer: MEDICARE

## 2019-12-10 VITALS
BODY MASS INDEX: 31.41 KG/M2 | WEIGHT: 160 LBS | RESPIRATION RATE: 20 BRPM | HEIGHT: 60 IN | DIASTOLIC BLOOD PRESSURE: 70 MMHG | HEART RATE: 86 BPM | SYSTOLIC BLOOD PRESSURE: 124 MMHG

## 2019-12-10 DIAGNOSIS — Z95.810 BIVENTRICULAR IMPLANTABLE CARDIOVERTER-DEFIBRILLATOR IN SITU: Primary | ICD-10-CM

## 2019-12-10 PROCEDURE — 99214 OFFICE O/P EST MOD 30 MIN: CPT | Performed by: INTERNAL MEDICINE

## 2019-12-10 PROCEDURE — 93284 PRGRMG EVAL IMPLANTABLE DFB: CPT | Performed by: INTERNAL MEDICINE

## 2019-12-10 PROCEDURE — 93290 INTERROG DEV EVAL ICPMS IP: CPT | Performed by: INTERNAL MEDICINE

## 2019-12-10 ASSESSMENT — ENCOUNTER SYMPTOMS
CHEST TIGHTNESS: 0
SHORTNESS OF BREATH: 0

## 2019-12-17 ENCOUNTER — NURSE ONLY (OUTPATIENT)
Dept: NON INVASIVE DIAGNOSTICS | Age: 84
End: 2019-12-17
Payer: MEDICARE

## 2019-12-17 PROCEDURE — 93295 DEV INTERROG REMOTE 1/2/MLT: CPT | Performed by: INTERNAL MEDICINE

## 2019-12-17 PROCEDURE — 93296 REM INTERROG EVL PM/IDS: CPT | Performed by: INTERNAL MEDICINE

## 2019-12-17 PROCEDURE — 93297 REM INTERROG DEV EVAL ICPMS: CPT | Performed by: INTERNAL MEDICINE

## 2020-01-14 ENCOUNTER — TELEPHONE (OUTPATIENT)
Dept: NON INVASIVE DIAGNOSTICS | Age: 85
End: 2020-01-14

## 2020-01-14 NOTE — TELEPHONE ENCOUNTER
We have received your remote transmission. Our staff will contact you if there is anything that needs to be discussed. Your next appointment is March 17, 2020 for remote transmission. Left detailed message.

## 2020-01-14 NOTE — PROGRESS NOTES
See [employer] [place] report. Remote monitoring reviewed over a 90 day period. End of 90 day monitoring period date of service 12-17-19.

## 2020-02-03 RX ORDER — HYDROCHLOROTHIAZIDE 12.5 MG/1
12.5 CAPSULE, GELATIN COATED ORAL DAILY
Qty: 90 CAPSULE | Refills: 0 | Status: SHIPPED
Start: 2020-02-03 | End: 2020-05-12 | Stop reason: SDUPTHER

## 2020-02-03 RX ORDER — LOSARTAN POTASSIUM 50 MG/1
50 TABLET ORAL DAILY
Qty: 90 TABLET | Refills: 0 | Status: SHIPPED
Start: 2020-02-03 | End: 2020-05-07 | Stop reason: SDUPTHER

## 2020-02-03 NOTE — TELEPHONE ENCOUNTER
Pharmacy calling in. The Losartan Hctz combo is currently on backorder. They are wondering if you can send over new scripts  the medications?   Order pended, thank you

## 2020-02-19 ENCOUNTER — OFFICE VISIT (OUTPATIENT)
Dept: CARDIOLOGY CLINIC | Age: 85
End: 2020-02-19
Payer: MEDICARE

## 2020-02-19 VITALS
HEART RATE: 70 BPM | BODY MASS INDEX: 30.43 KG/M2 | WEIGHT: 155 LBS | RESPIRATION RATE: 16 BRPM | DIASTOLIC BLOOD PRESSURE: 64 MMHG | HEIGHT: 60 IN | SYSTOLIC BLOOD PRESSURE: 116 MMHG

## 2020-02-19 PROCEDURE — 99214 OFFICE O/P EST MOD 30 MIN: CPT | Performed by: INTERNAL MEDICINE

## 2020-02-19 PROCEDURE — 93000 ELECTROCARDIOGRAM COMPLETE: CPT | Performed by: INTERNAL MEDICINE

## 2020-02-19 NOTE — PROGRESS NOTES
Alcohol use: No    Drug use: No    Sexual activity: Not Currently     Partners: Male     Birth control/protection: Post-menopausal   Lifestyle    Physical activity:     Days per week: None     Minutes per session: None    Stress: None   Relationships    Social connections:     Talks on phone: None     Gets together: None     Attends Restorationism service: None     Active member of club or organization: None     Attends meetings of clubs or organizations: None     Relationship status: None    Intimate partner violence:     Fear of current or ex partner: None     Emotionally abused: None     Physically abused: None     Forced sexual activity: None   Other Topics Concern    None   Social History Narrative    None       Current Outpatient Medications   Medication Sig Dispense Refill    losartan (COZAAR) 50 MG tablet Take 1 tablet by mouth daily 90 tablet 0    hydrochlorothiazide (MICROZIDE) 12.5 MG capsule Take 1 capsule by mouth daily 90 capsule 0    RHOPRESSA 0.02 % SOLN       losartan-hydrochlorothiazide (HYZAAR) 50-12.5 MG per tablet Take 1 tablet by mouth daily 90 tablet 1    nebivolol (BYSTOLIC) 2.5 MG tablet Take 1 tablet by mouth daily In addition to 5 mg for total of 7.5 mg (Patient taking differently: Take 3 tablets by mouth daily) 180 tablet 1    ibuprofen (ADVIL;MOTRIN) 800 MG tablet Take 1 tablet by mouth daily as needed (for arthritis pain) 30 tablet 1    ONETOUCH VERIO strip 1 each by Other route daily 100 each 5    glipiZIDE (GLUCOTROL) 10 MG tablet Take 1 tablet by mouth 2 times daily (before meals) 180 tablet 1    potassium chloride (KLOR-CON M) 10 MEQ extended release tablet Take 1 tablet by mouth 3 times daily 3 tablets by mouth every day 90 tablet 5    Handicap Placard MISC by Does not apply route      brimonidine-timolol (COMBIGAN) 0.2-0.5 % ophthalmic solution Place 1 drop into both eyes every 12 hours      Cholecalciferol (VITAMIN D-3) 1000 units CAPS Take by mouth daily      brinzolamide (AZOPT) 1 % ophthalmic suspension Place 1 drop into both eyes 2 times daily      bimatoprost (LUMIGAN) 0.01 % SOLN ophthalmic drops Place 1 drop into both eyes nightly      brimonidine (ALPHAGAN P) 0.15 % ophthalmic solution Place 1 drop into both eyes 2 times daily      BIOTIN PO Take 5,000 mcg by mouth daily       vitamin E 400 UNIT capsule Take 400 Units by mouth daily.  metFORMIN (GLUCOPHAGE) 500 MG tablet Take 1 tablet by mouth 2 times daily One tab qam and two tab qpm (Doesn't always take 2 in pm) (Patient not taking: Reported on 12/10/2019) 60 tablet 5     No current facility-administered medications for this visit. Allergies   Allergen Reactions    Codeine     Statins        Chief Complaint:  Ifeoma Jiménez is here today for follow up and management/recomendations for CAD, NICM, LBBB, HTN. History of Present Illness: Ifeoma Jiménez states that She was doing [employer] work & goes shopping. she rarely goes upstairs and she goes bowling. Her activities are limited due to her back and lower extremity pains. She denies any chest discomfort or BURGER with any of these activities. She denies orthopnea/PND or lower extremity edema. She denies any palpitations or presyncopal symptoms. REVIEW OF SYSTEMS:  As above. Patient does not complain of any fever, chills, nausea, vomiting or diarrhea. No focal, motor or neurological deficits. No changes in his/her vision, hearing, bowel or bladder habits. She is not known to have a history of thyroid problems. No recent nose bleeds. PHYSICAL EXAM:  Vitals:    02/19/20 1540   BP: 116/64   Pulse: 70   Resp: 16   Weight: 155 lb (70.3 kg)   Height: 5' (1.524 m)       GENERAL:  She is alert and oriented x 3, communicates well, in no distress. NECK:  No masses, trachea is mid position. Supple, full ROM, no JVD or bruits. No palpable thyromegaly or lymphadenopathy. HEART:  Regular rate and rhythm. Normal S1 and S2.  There is an

## 2020-03-17 ENCOUNTER — NURSE ONLY (OUTPATIENT)
Dept: NON INVASIVE DIAGNOSTICS | Age: 85
End: 2020-03-17
Payer: MEDICARE

## 2020-03-17 PROCEDURE — 93296 REM INTERROG EVL PM/IDS: CPT | Performed by: INTERNAL MEDICINE

## 2020-03-17 PROCEDURE — 93295 DEV INTERROG REMOTE 1/2/MLT: CPT | Performed by: INTERNAL MEDICINE

## 2020-04-06 RX ORDER — NEBIVOLOL 2.5 MG/1
7.5 TABLET ORAL DAILY
Qty: 270 TABLET | Refills: 0 | Status: SHIPPED
Start: 2020-04-06 | End: 2020-08-03 | Stop reason: SDUPTHER

## 2020-05-07 RX ORDER — LOSARTAN POTASSIUM 50 MG/1
50 TABLET ORAL DAILY
Qty: 90 TABLET | Refills: 0 | Status: SHIPPED
Start: 2020-05-07 | End: 2020-05-12 | Stop reason: SDUPTHER

## 2020-05-07 NOTE — TELEPHONE ENCOUNTER
Last Appointment:  11/11/2019  Future Appointments   Date Time Provider Cielo Rice   6/16/2020  9:10 AM SCHEDULE, REMOTE [facility]   6/25/2020  2:45 PM Melissa Nick  [address]

## 2020-05-12 ENCOUNTER — OFFICE VISIT (OUTPATIENT)
Dept: FAMILY MEDICINE CLINIC | Age: 85
End: 2020-05-12
Payer: MEDICARE

## 2020-05-12 ENCOUNTER — HOSPITAL ENCOUNTER (OUTPATIENT)
Age: 85
Discharge: HOME | End: 2020-05-14
Payer: MEDICARE

## 2020-05-12 VITALS
OXYGEN SATURATION: 98 % | SYSTOLIC BLOOD PRESSURE: 112 MMHG | HEART RATE: 81 BPM | DIASTOLIC BLOOD PRESSURE: 74 MMHG | TEMPERATURE: 97.4 F | WEIGHT: 158.4 LBS | HEIGHT: 60 IN | BODY MASS INDEX: 31.1 KG/M2

## 2020-05-12 LAB
ALBUMIN SERPL-MCNC: 3.8 G/DL (ref 3.5–5.2)
ALP BLD-CCNC: 100 U/L (ref 35–104)
ALT SERPL-CCNC: 13 U/L (ref 0–32)
ANION GAP SERPL CALCULATED.3IONS-SCNC: 12 MMOL/L (ref 7–16)
AST SERPL-CCNC: 19 U/L (ref 0–31)
BASOPHILS ABSOLUTE: 0.05 E9/L (ref 0–0.2)
BASOPHILS RELATIVE PERCENT: 0.7 % (ref 0–2)
BILIRUB SERPL-MCNC: <0.2 MG/DL (ref 0–1.2)
BUN BLDV-MCNC: 35 MG/DL (ref 8–23)
CALCIUM SERPL-MCNC: 9.6 MG/DL (ref 8.6–10.2)
CHLORIDE BLD-SCNC: 106 MMOL/L (ref 98–107)
CHOLESTEROL, TOTAL: 221 MG/DL (ref 0–199)
CO2: 18 MMOL/L (ref 22–29)
CREAT SERPL-MCNC: 1.4 MG/DL (ref 0.5–1)
EOSINOPHILS ABSOLUTE: 0.16 E9/L (ref 0.05–0.5)
EOSINOPHILS RELATIVE PERCENT: 2.4 % (ref 0–6)
GFR AFRICAN AMERICAN: 43
GFR NON-AFRICAN AMERICAN: 36 ML/MIN/1.73
GLUCOSE BLD-MCNC: 218 MG/DL (ref 74–99)
HBA1C MFR BLD: 7.4 % (ref 4–5.6)
HCT VFR BLD CALC: 37.1 % (ref 34–48)
HDLC SERPL-MCNC: 35 MG/DL
HEMOGLOBIN: 11.9 G/DL (ref 11.5–15.5)
IMMATURE GRANULOCYTES #: 0.02 E9/L
IMMATURE GRANULOCYTES %: 0.3 % (ref 0–5)
LDL CHOLESTEROL CALCULATED: ABNORMAL MG/DL (ref 0–99)
LYMPHOCYTES ABSOLUTE: 2.11 E9/L (ref 1.5–4)
LYMPHOCYTES RELATIVE PERCENT: 31.5 % (ref 20–42)
MCH RBC QN AUTO: 31.3 PG (ref 26–35)
MCHC RBC AUTO-ENTMCNC: 32.1 % (ref 32–34.5)
MCV RBC AUTO: 97.6 FL (ref 80–99.9)
MONOCYTES ABSOLUTE: 0.62 E9/L (ref 0.1–0.95)
MONOCYTES RELATIVE PERCENT: 9.3 % (ref 2–12)
NEUTROPHILS ABSOLUTE: 3.74 E9/L (ref 1.8–7.3)
NEUTROPHILS RELATIVE PERCENT: 55.8 % (ref 43–80)
PDW BLD-RTO: 12.8 FL (ref 11.5–15)
PLATELET # BLD: 258 E9/L (ref 130–450)
PMV BLD AUTO: 10.1 FL (ref 7–12)
POTASSIUM SERPL-SCNC: 4.2 MMOL/L (ref 3.5–5)
RBC # BLD: 3.8 E12/L (ref 3.5–5.5)
SODIUM BLD-SCNC: 136 MMOL/L (ref 132–146)
TOTAL PROTEIN: 6.6 G/DL (ref 6.4–8.3)
TRIGL SERPL-MCNC: 414 MG/DL (ref 0–149)
TSH SERPL DL<=0.05 MIU/L-ACNC: 2.29 UIU/ML (ref 0.27–4.2)
VLDLC SERPL CALC-MCNC: ABNORMAL MG/DL
WBC # BLD: 6.7 E9/L (ref 4.5–11.5)

## 2020-05-12 PROCEDURE — 83036 HEMOGLOBIN GLYCOSYLATED A1C: CPT

## 2020-05-12 PROCEDURE — 36415 COLL VENOUS BLD VENIPUNCTURE: CPT

## 2020-05-12 PROCEDURE — 84443 ASSAY THYROID STIM HORMONE: CPT

## 2020-05-12 PROCEDURE — 85025 COMPLETE CBC W/AUTO DIFF WBC: CPT

## 2020-05-12 PROCEDURE — 80061 LIPID PANEL: CPT

## 2020-05-12 PROCEDURE — 99214 OFFICE O/P EST MOD 30 MIN: CPT | Performed by: FAMILY MEDICINE

## 2020-05-12 PROCEDURE — 80053 COMPREHEN METABOLIC PANEL: CPT

## 2020-05-12 RX ORDER — HYDROCHLOROTHIAZIDE 12.5 MG/1
12.5 CAPSULE, GELATIN COATED ORAL DAILY
Qty: 90 CAPSULE | Refills: 0 | Status: SHIPPED
Start: 2020-05-12 | End: 2020-09-08 | Stop reason: SDUPTHER

## 2020-05-12 RX ORDER — LOSARTAN POTASSIUM 50 MG/1
50 TABLET ORAL DAILY
Qty: 90 TABLET | Refills: 0 | Status: SHIPPED
Start: 2020-05-12 | End: 2020-09-08 | Stop reason: SDUPTHER

## 2020-05-12 ASSESSMENT — PATIENT HEALTH QUESTIONNAIRE - PHQ9
1. LITTLE INTEREST OR PLEASURE IN DOING THINGS: 0
SUM OF ALL RESPONSES TO PHQ QUESTIONS 1-9: 0
SUM OF ALL RESPONSES TO PHQ QUESTIONS 1-9: 0
SUM OF ALL RESPONSES TO PHQ9 QUESTIONS 1 & 2: 0
2. FEELING DOWN, DEPRESSED OR HOPELESS: 0

## 2020-05-12 NOTE — PROGRESS NOTES
OFFICE NOTE    5/12/20  Name: Lucrecia Marinelli  VKE:[id number]   Sex:female   Age:87 y.o. SUBJECTIVE  Chief Complaint   Patient presents with    Pre-op Exam     Eye Sx 6/4/20       HPI Says she is having surgery under Dr. Sujit Da Silva at [facility]  And needed medical clearance. Saw Dr. Flor Delacruz in February and was cleared from Cardiovascular standpoint. Carries diagnosis of cardiomyopathy which is compensated and asthma which now would be classified as mild intermittent    Review of Systems   No fever, cough. Allergy symptoms. Says her vision is very satisfactory. No chest pain, orthopnea palpitations.  Denies use of ASA or other blood thinners        Current Outpatient Medications:     hydroCHLOROthiazide (MICROZIDE) 12.5 MG capsule, Take 1 capsule by mouth daily, Disp: 90 capsule, Rfl: 0    losartan (COZAAR) 50 MG tablet, Take 1 tablet by mouth daily, Disp: 90 tablet, Rfl: 0    nebivolol (BYSTOLIC) 2.5 MG tablet, Take 3 tablets by mouth daily, Disp: 270 tablet, Rfl: 0    RHOPRESSA 0.02 % SOLN, , Disp: , Rfl:     ibuprofen (ADVIL;MOTRIN) 800 MG tablet, Take 1 tablet by mouth daily as needed (for arthritis pain), Disp: 30 tablet, Rfl: 1    ONETOUCH VERIO strip, 1 each by Other route daily, Disp: 100 each, Rfl: 5    glipiZIDE (GLUCOTROL) 10 MG tablet, Take 1 tablet by mouth 2 times daily (before meals), Disp: 180 tablet, Rfl: 1    potassium chloride (KLOR-CON M) 10 MEQ extended release tablet, Take 1 tablet by mouth 3 times daily 3 tablets by mouth every day, Disp: 90 tablet, Rfl: 5    Handicap Placard MISC, by Does not apply route, Disp: , Rfl:     brimonidine-timolol (COMBIGAN) 0.2-0.5 % ophthalmic solution, Place 1 drop into both eyes every 12 hours, Disp: , Rfl:     Cholecalciferol (VITAMIN D-3) 1000 units CAPS, Take by mouth daily, Disp: , Rfl:     brinzolamide (AZOPT) 1 % ophthalmic suspension, Place 1 drop into both eyes 2 times daily, Disp: , Rfl:     bimatoprost (LUMIGAN) 0.01 % SOLN ophthalmic pretty well. Has some OA of low back and hips. Lymphadenopathy:      Cervical: No cervical adenopathy. Skin:     General: Skin is warm and dry. Findings: No rash. Neurological:      General: No focal deficit present. Mental Status: She is alert and oriented to person, place, and time. Sensory: No sensory deficit. Motor: No abnormal muscle tone. Gait: Gait normal.   Psychiatric:         Mood and Affect: Mood normal.         Behavior: Behavior normal.           [place] was seen today for pre-op exam.    Diagnoses and all orders for this visit:    Preop general physical exam  -     CBC Auto Differential; Future  -     Comprehensive Metabolic Panel; Future  Due for annual BW which was ordered. Cardiology note reviewed. Medically cleared for planned outpatient eye surgery    Cardiomyopathy, nonischemic (HCC)  -     hydroCHLOROthiazide (MICROZIDE) 12.5 MG capsule; Take 1 capsule by mouth daily  -     losartan (COZAAR) 50 MG tablet; Take 1 tablet by mouth daily  -     CBC Auto Differential; Future  -     Comprehensive Metabolic Panel; Future  -     Lipid Panel; Future  -     TSH without Reflex; Future  Compensated. Reviewed Cardiology note    Mild intermittent asthma without complication. Lungs clear, has not had to use inhaler in more than a year    Type 2 diabetes mellitus without complication, without long-term current use of insulin (HCC)  -     Hemoglobin A1C; Future  -     Cancel: MICROALBUMIN / CREATININE URINE RATIO; Future  Control has been pretty good          Return in about 4 months (around 9/12/2020).     Electronically signed by Marcello Galeano MD on 5/12/20 at 2:33 PM EDT

## 2020-05-20 RX ORDER — GLIPIZIDE 10 MG/1
10 TABLET ORAL
Qty: 180 TABLET | Refills: 1 | Status: SHIPPED
Start: 2020-05-20 | End: 2021-03-26 | Stop reason: SDUPTHER

## 2020-05-20 NOTE — TELEPHONE ENCOUNTER
Last Appointment:  5/12/2020  Future Appointments   Date Time Provider Cielo Rice   6/16/2020  9:10 AM SCHEDULE, REMOTE [facility]   9/16/2020  1:00 PM Leticia Mata MD [address] needs refill on glipizide sent to [facility]

## 2020-05-29 ENCOUNTER — NURSE ONLY (OUTPATIENT)
Dept: PRIMARY CARE CLINIC | Age: 85
End: 2020-05-29

## 2020-05-29 ENCOUNTER — HOSPITAL ENCOUNTER (OUTPATIENT)
Age: 85
Discharge: HOME | End: 2020-05-31
Payer: MEDICARE

## 2020-05-29 PROCEDURE — U0003 INFECTIOUS AGENT DETECTION BY NUCLEIC ACID (DNA OR RNA); SEVERE ACUTE RESPIRATORY SYNDROME CORONAVIRUS 2 (SARS-COV-2) (CORONAVIRUS DISEASE [COVID-19]), AMPLIFIED PROBE TECHNIQUE, MAKING USE OF HIGH THROUGHPUT TECHNOLOGIES AS DESCRIBED BY CMS-2020-01-R: HCPCS

## 2020-06-02 LAB
SARS-COV-2: NOT DETECTED
SOURCE: NORMAL

## 2020-06-05 RX ORDER — POTASSIUM CHLORIDE 750 MG/1
10 TABLET, EXTENDED RELEASE ORAL 3 TIMES DAILY
Qty: 90 TABLET | Refills: 5 | Status: SHIPPED
Start: 2020-06-05 | End: 2021-08-09 | Stop reason: SDUPTHER

## 2020-06-16 ENCOUNTER — NURSE ONLY (OUTPATIENT)
Dept: NON INVASIVE DIAGNOSTICS | Age: 85
End: 2020-06-16
Payer: MEDICARE

## 2020-06-16 PROCEDURE — 93295 DEV INTERROG REMOTE 1/2/MLT: CPT | Performed by: INTERNAL MEDICINE

## 2020-06-16 PROCEDURE — 93296 REM INTERROG EVL PM/IDS: CPT | Performed by: INTERNAL MEDICINE

## 2020-08-03 RX ORDER — NEBIVOLOL 2.5 MG/1
7.5 TABLET ORAL DAILY
Qty: 270 TABLET | Refills: 0 | Status: SHIPPED
Start: 2020-08-03 | End: 2020-10-21 | Stop reason: SDUPTHER

## 2020-08-03 NOTE — TELEPHONE ENCOUNTER
Last Appointment:  5/12/2020  Future Appointments   Date Time Provider Cielo Rice   9/15/2020  9:45 AM SCHEDULE, REMOTE [facility]   9/16/2020  1:00 PM Dg Hanley  [facility] [address]

## 2020-09-08 RX ORDER — LOSARTAN POTASSIUM 50 MG/1
50 TABLET ORAL DAILY
Qty: 90 TABLET | Refills: 0 | Status: SHIPPED
Start: 2020-09-08 | End: 2021-02-01 | Stop reason: SDUPTHER

## 2020-09-08 RX ORDER — HYDROCHLOROTHIAZIDE 12.5 MG/1
12.5 CAPSULE, GELATIN COATED ORAL DAILY
Qty: 90 CAPSULE | Refills: 0 | Status: SHIPPED
Start: 2020-09-08 | End: 2021-02-01 | Stop reason: SDUPTHER

## 2020-09-08 NOTE — TELEPHONE ENCOUNTER
Last Appointment:  5/12/2020  Future Appointments   Date Time Provider Cielo Rice   9/15/2020  9:45 AM SCHEDULE, REMOTE [facility]   9/16/2020  1:00 PM MD ROSSY Ware [facility]      Pt asking for refills om losartan and hctz to [facility]

## 2020-09-15 ENCOUNTER — NURSE ONLY (OUTPATIENT)
Dept: NON INVASIVE DIAGNOSTICS | Age: 85
End: 2020-09-15
Payer: MEDICARE

## 2020-09-15 PROCEDURE — 93296 REM INTERROG EVL PM/IDS: CPT | Performed by: INTERNAL MEDICINE

## 2020-09-15 PROCEDURE — 93297 REM INTERROG DEV EVAL ICPMS: CPT | Performed by: INTERNAL MEDICINE

## 2020-09-15 PROCEDURE — 93295 DEV INTERROG REMOTE 1/2/MLT: CPT | Performed by: INTERNAL MEDICINE

## 2020-09-16 ENCOUNTER — OFFICE VISIT (OUTPATIENT)
Dept: FAMILY MEDICINE CLINIC | Age: 85
End: 2020-09-16
Payer: MEDICARE

## 2020-09-16 VITALS
WEIGHT: 159.2 LBS | BODY MASS INDEX: 31.09 KG/M2 | HEART RATE: 85 BPM | SYSTOLIC BLOOD PRESSURE: 112 MMHG | OXYGEN SATURATION: 98 % | DIASTOLIC BLOOD PRESSURE: 70 MMHG | TEMPERATURE: 97.4 F

## 2020-09-16 LAB — HBA1C MFR BLD: 8.8 %

## 2020-09-16 PROCEDURE — 83036 HEMOGLOBIN GLYCOSYLATED A1C: CPT | Performed by: FAMILY MEDICINE

## 2020-09-16 PROCEDURE — 99213 OFFICE O/P EST LOW 20 MIN: CPT | Performed by: FAMILY MEDICINE

## 2020-09-16 PROCEDURE — 3052F HG A1C>EQUAL 8.0%<EQUAL 9.0%: CPT | Performed by: FAMILY MEDICINE

## 2020-09-16 ASSESSMENT — PATIENT HEALTH QUESTIONNAIRE - PHQ9
2. FEELING DOWN, DEPRESSED OR HOPELESS: 0
SUM OF ALL RESPONSES TO PHQ9 QUESTIONS 1 & 2: 0
1. LITTLE INTEREST OR PLEASURE IN DOING THINGS: 0
SUM OF ALL RESPONSES TO PHQ QUESTIONS 1-9: 0
SUM OF ALL RESPONSES TO PHQ QUESTIONS 1-9: 0

## 2020-09-16 NOTE — PROGRESS NOTES
OFFICE NOTE    9/16/20  Name: Sebastián Camarillo  P:[id number]   Sex:female   Age:87 y.o. SUBJECTIVE  Chief Complaint   Patient presents with    Hypertension    Diabetes       HPI Comes in for f/u mainly for her DM. Has asthma, has been well controlled    Review of Systems   Constitutional: Positive for fatigue. Negative for appetite change, fever and unexpected weight change. HENT: Positive for rhinorrhea. Negative for congestion, ear pain and postnasal drip. Eyes: Negative. Negative for photophobia, redness and visual disturbance. Has significant glaucoma. Respiratory: Negative for cough, chest tightness, shortness of breath and wheezing. Cardiovascular: Negative for chest pain and palpitations. Gastrointestinal: Negative for abdominal pain, blood in stool, constipation, diarrhea and vomiting. Endocrine: Negative for cold intolerance, polydipsia and polyuria. Genitourinary: Positive for urgency. Negative for dysuria and hematuria. Musculoskeletal: Positive for arthralgias. Negative for gait problem and joint swelling. Skin: Negative for rash and wound. Allergic/Immunologic: Negative for environmental allergies and food allergies. Neurological: Negative for dizziness, tremors, seizures, weakness, numbness and headaches. Hematological: Negative for adenopathy. Does not bruise/bleed easily. Psychiatric/Behavioral: Negative for behavioral problems, confusion, dysphoric mood and sleep disturbance. The patient is nervous/anxious.              Current Outpatient Medications:     losartan (COZAAR) 50 MG tablet, Take 1 tablet by mouth daily, Disp: 90 tablet, Rfl: 0    hydroCHLOROthiazide (MICROZIDE) 12.5 MG capsule, Take 1 capsule by mouth daily, Disp: 90 capsule, Rfl: 0    nebivolol (BYSTOLIC) 2.5 MG tablet, Take 3 tablets by mouth daily, Disp: 270 tablet, Rfl: 0    potassium chloride (KLOR-CON M) 10 MEQ extended release tablet, Take 1 tablet by mouth 3 times daily 3 tablets by mouth every day, Disp: 90 tablet, Rfl: 5    glipiZIDE (GLUCOTROL) 10 MG tablet, Take 1 tablet by mouth 2 times daily (before meals), Disp: 180 tablet, Rfl: 1    RHOPRESSA 0.02 % SOLN, , Disp: , Rfl:     ibuprofen (ADVIL;MOTRIN) 800 MG tablet, Take 1 tablet by mouth daily as needed (for arthritis pain), Disp: 30 tablet, Rfl: 1    ONETOUCH VERIO strip, 1 each by Other route daily, Disp: 100 each, Rfl: 5    Handicap Placard MISC, by Does not apply route, Disp: , Rfl:     brimonidine-timolol (COMBIGAN) 0.2-0.5 % ophthalmic solution, Place 1 drop into both eyes every 12 hours, Disp: , Rfl:     Cholecalciferol (VITAMIN D-3) 1000 units CAPS, Take by mouth daily, Disp: , Rfl:     brinzolamide (AZOPT) 1 % ophthalmic suspension, Place 1 drop into both eyes 2 times daily, Disp: , Rfl:     bimatoprost (LUMIGAN) 0.01 % SOLN ophthalmic drops, Place 1 drop into both eyes nightly, Disp: , Rfl:     brimonidine (ALPHAGAN P) 0.15 % ophthalmic solution, Place 1 drop into both eyes 2 times daily, Disp: , Rfl:     BIOTIN PO, Take 5,000 mcg by mouth daily , Disp: , Rfl:     vitamin E 400 UNIT capsule, Take 400 Units by mouth daily. , Disp: , Rfl:   Allergies   Allergen Reactions    Codeine     Statins        Past Medical History:   Diagnosis Date    Arthritis     Asthma     CAD (coronary artery disease)     Diabetes mellitus ([address]     GERD (gastroesophageal reflux disease)     Hyperlipidemia     Hypertension     ICD (implantable cardiac defibrillator) battery depletion     Chinyere Railing (biventricular)    LBBB (left bundle branch block)     Nonischemic cardiomyopathy ([address]      Past Surgical History:   Procedure Laterality Date    CARDIAC DEFIBRILLATOR PLACEMENT  09/01/2011    CARDIAC DEFIBRILLATOR PLACEMENT Left 05/09/2017    Bi-V gen change, Medtronic, WRAP-IT study,     CARPAL TUNNEL RELEASE      right arm    CATARACT REMOVAL WITH IMPLANT  2010    DIAGNOSTIC CARDIAC CATH LAB PROCEDURE  3/23/10     [address]    left eye     Family History   Problem Relation Age of Onset    Cancer Father      Social History     Tobacco History     Smoking Status  Never Smoker    Smokeless Tobacco Use  Never Used          Alcohol History     Alcohol Use Status  No          Drug Use     Drug Use Status  No          Sexual Activity     Sexually Active  Not Currently Partners  Male Birth Control/Protection  Post-menopausal                OBJECTIVE  Vitals:    09/16/20 1302   BP: 112/70   Site: Left Upper Arm   Position: Sitting   Pulse: 85   Temp: 97.4 °F (36.3 °C)   TempSrc: Temporal   SpO2: 98%   Weight: 159 lb 3.2 oz (72.2 kg)        Body mass index is 31.09 kg/m². Orders Placed This Encounter   Procedures    POCT glycosylated hemoglobin (Hb A1C)        EXAM   Physical Exam  Vitals signs and nursing note reviewed. Constitutional:       Appearance: She is well-developed. HENT:      Right Ear: Tympanic membrane, ear canal and external ear normal.      Left Ear: Tympanic membrane, ear canal and external ear normal.      Nose: Nose normal.      Mouth/Throat:      Pharynx: Oropharynx is clear. Eyes:      General: No scleral icterus. Conjunctiva/sclera: Conjunctivae normal.      Pupils: Pupils are equal, round, and reactive to light. Neck:      Musculoskeletal: Normal range of motion and neck supple. Thyroid: No thyroid mass or thyromegaly. Vascular: No carotid bruit or JVD. Trachea: Trachea normal.   Cardiovascular:      Rate and Rhythm: Normal rate and regular rhythm. Pulses: Normal pulses. Heart sounds: Murmur present. No gallop. Pulmonary:      Effort: Pulmonary effort is normal.      Breath sounds: Normal breath sounds. No wheezing, rhonchi or rales. Abdominal:      General: Bowel sounds are normal. There is no distension. Palpations: Abdomen is soft. There is no mass. Tenderness: There is no abdominal tenderness. There is no guarding. Hernia: No hernia is present. Musculoskeletal: Normal range of motion. General: No swelling or tenderness. Right lower leg: No edema. Left lower leg: No edema. Lymphadenopathy:      Cervical: No cervical adenopathy. Skin:     General: Skin is warm and dry. Capillary Refill: Capillary refill takes less than 2 seconds. Coloration: Skin is not jaundiced. Findings: No bruising or rash. Neurological:      General: No focal deficit present. Mental Status: She is alert and oriented to person, place, and time. Sensory: Sensory deficit present. Motor: No weakness or abnormal muscle tone. Coordination: Coordination normal.      Gait: Gait normal.   Psychiatric:         Mood and Affect: Mood normal.         Behavior: Behavior normal.           [place] was seen today for hypertension and diabetes. Diagnoses and all orders for this visit:    Diabetes 1.5, managed as type 2 ([address]  -     POCT glycosylated hemoglobin (Hb A1C)    BP good, no changes made      Return in about 6 months (around 3/16/2021).     Electronically signed by Alden Chamberlain MD on 9/16/20 at 1:26 PM EDT

## 2020-09-17 ASSESSMENT — ENCOUNTER SYMPTOMS
DIARRHEA: 0
ABDOMINAL PAIN: 0
WHEEZING: 0
PHOTOPHOBIA: 0
EYE REDNESS: 0
EYES NEGATIVE: 1
CHEST TIGHTNESS: 0
VOMITING: 0
RHINORRHEA: 1
COUGH: 0
SHORTNESS OF BREATH: 0
CONSTIPATION: 0
BLOOD IN STOOL: 0

## 2020-09-18 ENCOUNTER — TELEPHONE (OUTPATIENT)
Dept: FAMILY MEDICINE CLINIC | Age: 85
End: 2020-09-18

## 2020-09-18 NOTE — TELEPHONE ENCOUNTER
----- Message from Charissa Arndt MA sent at 9/17/2020  8:30 AM EDT -----  Notified patient. Patient verbalized understanding. Pt states she has been on metformin before.

## 2020-10-21 RX ORDER — NEBIVOLOL 2.5 MG/1
7.5 TABLET ORAL DAILY
Qty: 270 TABLET | Refills: 0 | Status: SHIPPED
Start: 2020-10-21 | End: 2021-03-26 | Stop reason: SDUPTHER

## 2020-10-21 RX ORDER — IBUPROFEN 800 MG/1
800 TABLET ORAL DAILY PRN
Qty: 30 TABLET | Refills: 1 | Status: SHIPPED
Start: 2020-10-21 | End: 2021-03-26 | Stop reason: SDUPTHER

## 2020-10-21 NOTE — TELEPHONE ENCOUNTER
Name of Medication(s) Requested:  Ibuprofen & Bystolic    Pharmacy Requested:   [facility]    Medication(s) pended? [x] Yes  [] No    Last Appointment:  9/16/2020    Future appts:  Future Appointments   Date Time Provider Cielo Rice   12/15/2020  9:10 AM SCHEDULE, [facility]   3/16/2021  9:30 AM MD ROSSY Shukla [facility]        Does patient need call back?   [] Yes  [x] No

## 2020-10-28 ENCOUNTER — NURSE ONLY (OUTPATIENT)
Dept: FAMILY MEDICINE CLINIC | Age: 85
End: 2020-10-28
Payer: MEDICARE

## 2020-10-28 ENCOUNTER — TELEPHONE (OUTPATIENT)
Dept: CARDIOLOGY CLINIC | Age: 85
End: 2020-10-28

## 2020-10-28 PROCEDURE — 90694 VACC AIIV4 NO PRSRV 0.5ML IM: CPT | Performed by: FAMILY MEDICINE

## 2020-10-28 PROCEDURE — G0008 ADMIN INFLUENZA VIRUS VAC: HCPCS | Performed by: FAMILY MEDICINE

## 2020-10-28 NOTE — TELEPHONE ENCOUNTER
Patient needs cardiac clearance for trabeculectomy with [address] vs Xen vs tube stent at an [facility] under MAC by Dr. Jeffrey Tovar. Left message for patient to call the office to assess for any cardiac symptoms since last visit February 2020.

## 2020-12-01 ENCOUNTER — OFFICE VISIT (OUTPATIENT)
Dept: CARDIOLOGY CLINIC | Age: 85
End: 2020-12-01
Payer: MEDICARE

## 2020-12-01 VITALS
HEART RATE: 86 BPM | RESPIRATION RATE: 18 BRPM | DIASTOLIC BLOOD PRESSURE: 80 MMHG | WEIGHT: 157 LBS | HEIGHT: 60 IN | SYSTOLIC BLOOD PRESSURE: 106 MMHG | BODY MASS INDEX: 30.82 KG/M2

## 2020-12-01 PROCEDURE — 93000 ELECTROCARDIOGRAM COMPLETE: CPT | Performed by: INTERNAL MEDICINE

## 2020-12-01 PROCEDURE — 99214 OFFICE O/P EST MOD 30 MIN: CPT | Performed by: INTERNAL MEDICINE

## 2020-12-01 NOTE — PROGRESS NOTES
[place]  9/21/1932  Date of Service: 12/1/2020    Patient Active Problem List    Diagnosis Date Noted    Moderate persistent asthma without complication 16/04/1708    Insomnia due to medical condition 05/07/2019    Patient in clinical research study 05/09/2017     Overview Note:     WRAP-IT: Enrolled--5/9/2017  Closed      Diabetes 1.5, managed as type 2 ([facility] [address] 03/28/2012    CAD (coronary artery disease)      Overview Note:       A. Cardia catheterization (3/23/10 by Dr. Lin Archibald). D1 - 30-40%  Ramus - proximal 30-40%  RCA - ostial 40%      Biventricular implantable cardioverter-defibrillator in situ 09/10/2011     Overview Note:     A. Medtronic Protecta XT CRT/D, model M9086321, serial # [id number] implanted 9/1/11  B. Rght atrial lead is a Medtronic, model # T158791, serial # [id number]  C. Right ventricular lead is a Medtronic model # [id number], serial [id number]           Cardiomyopathy, nonischemic ([facility] [address] 08/18/2011     Overview Note:     A. S/P 2-D echocardiogram 6/16/11: LVEF 25%. Stage l diastolic dysfunction. Mild mitral, tricuspid and aortic regurgitation. Moderately increased LA volume  B. S/P 2=D echocardiogram 11/1910:  LVEF 35-40%  C. S/P adenosine stress test 11/20/10: no ischemia  D.  S/P cardiac catheterization 11/23/10: LVEF 30%; minimal coronary artery disease  E. 2-12 EF improved to 60% by echo & 57% by MUGA in 3-12      Bundle branch block, left 08/18/2011    Asthma 08/18/2011       Social History     Socioeconomic History    Marital status:      Spouse name: None    Number of children: None    Years of education: None    Highest education level: None   Occupational History    None   Social Needs    Financial resource strain: None    Food insecurity     Worry: None     Inability: None    Transportation needs     Medical: None     Non-medical: None   Tobacco Use    Smoking status: Never Smoker    Smokeless tobacco: Never Used   Substance and Sexual Activity    Alcohol use: No    Drug use: No    Sexual activity: Not Currently     Partners: Male     Birth control/protection: Post-menopausal   Lifestyle    Physical activity     Days per week: None     Minutes per session: None    Stress: None   Relationships    Social connections     Talks on phone: None     Gets together: None     Attends [employer] service: None     Active member of club or organization: None     Attends meetings of clubs or organizations: None     Relationship status: None    Intimate partner violence     Fear of current or ex partner: None     Emotionally abused: None     Physically abused: None     Forced sexual activity: None   Other Topics Concern    None   Social History Narrative    None       Current Outpatient Medications   Medication Sig Dispense Refill    ibuprofen (ADVIL;MOTRIN) 800 MG tablet Take 1 tablet by mouth daily as needed (for arthritis pain) 30 tablet 1    nebivolol (BYSTOLIC) 2.5 MG tablet Take 3 tablets by mouth daily 270 tablet 0    metFORMIN (GLUCOPHAGE) 500 MG tablet Take 1 tablet by mouth 2 times daily (with meals) 60 tablet 5    losartan (COZAAR) 50 MG tablet Take 1 tablet by mouth daily 90 tablet 0    hydroCHLOROthiazide (MICROZIDE) 12.5 MG capsule Take 1 capsule by mouth daily 90 capsule 0    potassium chloride (KLOR-CON M) 10 MEQ extended release tablet Take 1 tablet by mouth 3 times daily 3 tablets by mouth every day 90 tablet 5    glipiZIDE (GLUCOTROL) 10 MG tablet Take 1 tablet by mouth 2 times daily (before meals) 180 tablet 1    RHOPRESSA 0.02 % SOLN       ONETOUCH VERIO strip 1 each by Other route daily 100 each 5    Handicap Placard MISC by Does not apply route      brimonidine-timolol (COMBIGAN) 0.2-0.5 % ophthalmic solution Place 1 drop into both eyes every 12 hours      Cholecalciferol (VITAMIN D-3) 1000 units CAPS Take by mouth daily      brinzolamide (AZOPT) 1 % ophthalmic suspension Place 1 drop into both eyes 2 times daily      bimatoprost EYES:  Extraocular muscles intact. PERRL. Normal lids & conjunctiva. ENT:  Nares are clear & not bleeding. Moist mucosa. Normal lips formation. No external masses   NEURO: no tremors, full ROM x 4, EOMI. SKIN:  Warm, dry and intact. Normal turgor. EKG: V-paced, 86 bpm.       Assessment:   1. Coronary artery disease as outlined above. No symptoms of recurring ischemia at this time. 2. Cardiomyopathy as outlined above. Euvolemic and no decompensation at this time. EF improved to normal.  3. Hypertension, well controled at this time. 4. Hypercholesterolemia  5. LBBB        Recommendations:  1. She is following the cholesterol with Dr. Peter Drummond. 2. Continue her current cardiac medications  3. She is asking for preop cardiac risk stratification for eye surgery. Performs more than 4 METS of physical activities with no cardiac symptoms as described above. Therefore, she would be classified as low risk for perioperative ischemic cardiac events for this low risk procedure. No preop cardiac testing is needed. Continue her cardiac medications perioperatively. Thank you for allowing me to participate in your patient's care.       [id number], [facility]  Interventional Cardiology

## 2020-12-15 ENCOUNTER — NURSE ONLY (OUTPATIENT)
Dept: NON INVASIVE DIAGNOSTICS | Age: 85
End: 2020-12-15
Payer: MEDICARE

## 2020-12-15 DIAGNOSIS — I42.8 CARDIOMYOPATHY, NONISCHEMIC (HCC): ICD-10-CM

## 2020-12-15 DIAGNOSIS — Z95.810 BIVENTRICULAR IMPLANTABLE CARDIOVERTER-DEFIBRILLATOR IN SITU: Primary | ICD-10-CM

## 2020-12-15 DIAGNOSIS — I50.22 CHRONIC SYSTOLIC HEART FAILURE (HCC): ICD-10-CM

## 2020-12-15 PROCEDURE — 93295 DEV INTERROG REMOTE 1/2/MLT: CPT | Performed by: INTERNAL MEDICINE

## 2020-12-15 PROCEDURE — 93296 REM INTERROG EVL PM/IDS: CPT | Performed by: INTERNAL MEDICINE

## 2020-12-15 PROCEDURE — 93297 REM INTERROG DEV EVAL ICPMS: CPT | Performed by: INTERNAL MEDICINE

## 2021-02-01 DIAGNOSIS — I42.8 CARDIOMYOPATHY, NONISCHEMIC (HCC): ICD-10-CM

## 2021-02-01 RX ORDER — LOSARTAN POTASSIUM 50 MG/1
50 TABLET ORAL DAILY
Qty: 90 TABLET | Refills: 0 | Status: SHIPPED
Start: 2021-02-01 | End: 2021-03-26 | Stop reason: SDUPTHER

## 2021-02-01 RX ORDER — HYDROCHLOROTHIAZIDE 12.5 MG/1
12.5 CAPSULE, GELATIN COATED ORAL DAILY
Qty: 90 CAPSULE | Refills: 0 | Status: SHIPPED
Start: 2021-02-01 | End: 2021-02-10 | Stop reason: SDUPTHER

## 2021-02-01 NOTE — TELEPHONE ENCOUNTER
Last Appointment:  9/16/2020  Future Appointments   Date Time Provider Cielo Rice   3/16/2021  9:15 AM SCHEDULE, [facility]   3/24/2021  9:30 AM Oanh Willis  [address] [facility] [address]

## 2021-02-10 DIAGNOSIS — I42.8 CARDIOMYOPATHY, NONISCHEMIC (HCC): ICD-10-CM

## 2021-02-10 RX ORDER — HYDROCHLOROTHIAZIDE 12.5 MG/1
12.5 CAPSULE, GELATIN COATED ORAL DAILY
Qty: 90 CAPSULE | Refills: 1 | Status: SHIPPED
Start: 2021-02-10 | End: 2021-03-26 | Stop reason: SDUPTHER

## 2021-03-26 ENCOUNTER — OFFICE VISIT (OUTPATIENT)
Dept: FAMILY MEDICINE CLINIC | Age: 86
End: 2021-03-26
Payer: MEDICARE

## 2021-03-26 VITALS
BODY MASS INDEX: 30.74 KG/M2 | TEMPERATURE: 97.1 F | OXYGEN SATURATION: 95 % | WEIGHT: 157.4 LBS | SYSTOLIC BLOOD PRESSURE: 128 MMHG | DIASTOLIC BLOOD PRESSURE: 80 MMHG | HEART RATE: 97 BPM

## 2021-03-26 DIAGNOSIS — F03.90 DEMENTIA WITHOUT BEHAVIORAL DISTURBANCE, UNSPECIFIED DEMENTIA TYPE: Primary | ICD-10-CM

## 2021-03-26 DIAGNOSIS — E13.9 DIABETES 1.5, MANAGED AS TYPE 2 (HCC): ICD-10-CM

## 2021-03-26 DIAGNOSIS — I42.8 CARDIOMYOPATHY, NONISCHEMIC (HCC): ICD-10-CM

## 2021-03-26 DIAGNOSIS — M15.9 PRIMARY OSTEOARTHRITIS INVOLVING MULTIPLE JOINTS: ICD-10-CM

## 2021-03-26 DIAGNOSIS — F03.90 DEMENTIA WITHOUT BEHAVIORAL DISTURBANCE, UNSPECIFIED DEMENTIA TYPE: ICD-10-CM

## 2021-03-26 LAB
ANION GAP SERPL CALCULATED.3IONS-SCNC: 13 MMOL/L (ref 7–16)
BASOPHILS ABSOLUTE: 0.04 E9/L (ref 0–0.2)
BASOPHILS RELATIVE PERCENT: 0.6 % (ref 0–2)
BUN BLDV-MCNC: 28 MG/DL (ref 8–23)
CALCIUM SERPL-MCNC: 10.5 MG/DL (ref 8.6–10.2)
CHLORIDE BLD-SCNC: 94 MMOL/L (ref 98–107)
CO2: 22 MMOL/L (ref 22–29)
CREAT SERPL-MCNC: 1.3 MG/DL (ref 0.5–1)
EOSINOPHILS ABSOLUTE: 0.1 E9/L (ref 0.05–0.5)
EOSINOPHILS RELATIVE PERCENT: 1.5 % (ref 0–6)
GFR AFRICAN AMERICAN: 47
GFR NON-AFRICAN AMERICAN: 39 ML/MIN/1.73
GLUCOSE BLD-MCNC: 379 MG/DL (ref 74–99)
HBA1C MFR BLD: 11.3 % (ref 4–5.6)
HCT VFR BLD CALC: 41.7 % (ref 34–48)
HEMOGLOBIN: 13.6 G/DL (ref 11.5–15.5)
IMMATURE GRANULOCYTES #: 0.02 E9/L
IMMATURE GRANULOCYTES %: 0.3 % (ref 0–5)
LYMPHOCYTES ABSOLUTE: 2.18 E9/L (ref 1.5–4)
LYMPHOCYTES RELATIVE PERCENT: 31.6 % (ref 20–42)
MCH RBC QN AUTO: 31.6 PG (ref 26–35)
MCHC RBC AUTO-ENTMCNC: 32.6 % (ref 32–34.5)
MCV RBC AUTO: 96.8 FL (ref 80–99.9)
MONOCYTES ABSOLUTE: 0.61 E9/L (ref 0.1–0.95)
MONOCYTES RELATIVE PERCENT: 8.9 % (ref 2–12)
NEUTROPHILS ABSOLUTE: 3.94 E9/L (ref 1.8–7.3)
NEUTROPHILS RELATIVE PERCENT: 57.1 % (ref 43–80)
PDW BLD-RTO: 12.3 FL (ref 11.5–15)
PLATELET # BLD: 213 E9/L (ref 130–450)
PMV BLD AUTO: 11.3 FL (ref 7–12)
POTASSIUM SERPL-SCNC: 4.7 MMOL/L (ref 3.5–5)
RBC # BLD: 4.31 E12/L (ref 3.5–5.5)
SODIUM BLD-SCNC: 129 MMOL/L (ref 132–146)
VITAMIN B-12: 462 PG/ML (ref 211–946)
WBC # BLD: 6.9 E9/L (ref 4.5–11.5)

## 2021-03-26 PROCEDURE — 99214 OFFICE O/P EST MOD 30 MIN: CPT | Performed by: FAMILY MEDICINE

## 2021-03-26 RX ORDER — DONEPEZIL HYDROCHLORIDE 5 MG/1
5 TABLET, FILM COATED ORAL NIGHTLY
Qty: 90 TABLET | Refills: 1 | Status: SHIPPED
Start: 2021-03-26 | End: 2021-05-03

## 2021-03-26 RX ORDER — NEBIVOLOL 2.5 MG/1
7.5 TABLET ORAL DAILY
Qty: 270 TABLET | Refills: 1 | Status: SHIPPED
Start: 2021-03-26 | End: 2021-08-24 | Stop reason: SDUPTHER

## 2021-03-26 RX ORDER — HYDROCHLOROTHIAZIDE 12.5 MG/1
12.5 CAPSULE, GELATIN COATED ORAL DAILY
Qty: 90 CAPSULE | Refills: 1 | Status: ON HOLD
Start: 2021-03-26 | End: 2021-06-15 | Stop reason: HOSPADM

## 2021-03-26 RX ORDER — IBUPROFEN 800 MG/1
800 TABLET ORAL DAILY PRN
Qty: 30 TABLET | Refills: 1 | Status: SHIPPED
Start: 2021-03-26 | End: 2021-03-26

## 2021-03-26 RX ORDER — GLIPIZIDE 10 MG/1
10 TABLET ORAL
Qty: 180 TABLET | Refills: 1 | Status: SHIPPED
Start: 2021-03-26 | End: 2021-10-13

## 2021-03-26 RX ORDER — LOSARTAN POTASSIUM 50 MG/1
50 TABLET ORAL DAILY
Qty: 90 TABLET | Refills: 1 | Status: ON HOLD
Start: 2021-03-26 | End: 2021-06-15 | Stop reason: HOSPADM

## 2021-03-26 ASSESSMENT — PATIENT HEALTH QUESTIONNAIRE - PHQ9
2. FEELING DOWN, DEPRESSED OR HOPELESS: 0
SUM OF ALL RESPONSES TO PHQ9 QUESTIONS 1 & 2: 0
SUM OF ALL RESPONSES TO PHQ QUESTIONS 1-9: 0
1. LITTLE INTEREST OR PLEASURE IN DOING THINGS: 0
SUM OF ALL RESPONSES TO PHQ QUESTIONS 1-9: 0
SUM OF ALL RESPONSES TO PHQ QUESTIONS 1-9: 0

## 2021-03-26 ASSESSMENT — ENCOUNTER SYMPTOMS
DIARRHEA: 0
WHEEZING: 0
CONSTIPATION: 0
ABDOMINAL PAIN: 0
BLOOD IN STOOL: 0
EYES NEGATIVE: 1
CHEST TIGHTNESS: 0
VOMITING: 0
COUGH: 0

## 2021-03-26 NOTE — PROGRESS NOTES
OFFICE NOTE    3/26/21  Name: Mary Rosales  CHEL:[birth year]   Sex:female   Age:88 y.o. SUBJECTIVE  No chief complaint on file. Patient presents for routine follow up.  present with patient and concerned with patient's failing memory. States she has been getting mad at him when he tries to remind her of things. They have been missing appointments, she loses her hearing aids. requires routine medication refills. we of course were not to let her know of his concerns. She is very hard of hearing and did not have her hearing aids with her today,  has no idea where they are and feels she does not either. DM, HTN, asthma, glaucoma also issues        Review of Systems   Constitutional: Negative for appetite change, fever and unexpected weight change. HENT: Positive for hearing loss. Negative for congestion, ear pain and postnasal drip. Eyes: Negative. Respiratory: Negative for cough, chest tightness and wheezing. Cardiovascular: Negative for chest pain and palpitations. Gastrointestinal: Negative for abdominal pain, blood in stool, constipation, diarrhea and vomiting. Endocrine: Negative for cold intolerance, polydipsia and polyuria. Genitourinary: Negative for dysuria and hematuria. Musculoskeletal: Negative for arthralgias, gait problem and joint swelling. Skin: Negative for rash and wound. Allergic/Immunologic: Negative for environmental allergies and food allergies. Neurological: Negative for dizziness, tremors, weakness and headaches. Hematological: Negative for adenopathy. Does not bruise/bleed easily. Psychiatric/Behavioral: Positive for confusion. Negative for behavioral problems, dysphoric mood and sleep disturbance.             Current Outpatient Medications:     losartan (COZAAR) 50 MG tablet, Take 1 tablet by mouth daily, Disp: 90 tablet, Rfl: 1    nebivolol (BYSTOLIC) 2.5 MG tablet, Take 3 tablets by mouth daily, Disp: 270 tablet, Rfl: 1    glipiZIDE (GLUCOTROL) 10 MG tablet, Take 1 tablet by mouth 2 times daily (before meals), Disp: 180 tablet, Rfl: 1    hydroCHLOROthiazide (MICROZIDE) 12.5 MG capsule, Take 1 capsule by mouth daily, Disp: 90 capsule, Rfl: 1    donepezil (ARICEPT) 5 MG tablet, Take 1 tablet by mouth nightly, Disp: 90 tablet, Rfl: 1    metFORMIN (GLUCOPHAGE) 500 MG tablet, Take 1 tablet by mouth 2 times daily (with meals), Disp: 60 tablet, Rfl: 5    potassium chloride (KLOR-CON M) 10 MEQ extended release tablet, Take 1 tablet by mouth 3 times daily 3 tablets by mouth every day, Disp: 90 tablet, Rfl: 5    ONETOUCH VERIO strip, 1 each by Other route daily, Disp: 100 each, Rfl: 5    Handicap Placard MISC, by Does not apply route, Disp: , Rfl:   Allergies   Allergen Reactions    Codeine     Statins        Past Medical History:   Diagnosis Date    Arthritis     Asthma     CAD (coronary artery disease)     Diabetes mellitus (HCC)     GERD (gastroesophageal reflux disease)     Hyperlipidemia     Hypertension     ICD (implantable cardiac defibrillator) battery depletion     Kellen (biventricular)    LBBB (left bundle branch block)     Nonischemic cardiomyopathy (HCC)      Past Surgical History:   Procedure Laterality Date    CARDIAC DEFIBRILLATOR PLACEMENT  09/01/2011    CARDIAC DEFIBRILLATOR PLACEMENT Left 05/09/2017    Bi-V gen change, Medtronic, WRAP-IT study,     CARPAL TUNNEL RELEASE      right arm    CATARACT REMOVAL WITH IMPLANT  2010    DIAGNOSTIC CARDIAC CATH LAB PROCEDURE  3/23/10    Dr. Lotus Jerez    left eye     Family History   Problem Relation Age of Onset    Cancer Father      Social History     Tobacco History     Smoking Status  Never Smoker    Smokeless Tobacco Use  Never Used          Alcohol History     Alcohol Use Status  No          Drug Use     Drug Use Status  No          Sexual Activity     Sexually Active  Not Currently Partners  Male Birth Control/Protection  Post-menopausal              OBJECTIVE  Vitals:    03/26/21 0948   BP: 128/80   Site: Right Upper Arm   Position: Sitting   Pulse: 97   Temp: 97.1 °F (36.2 °C)   TempSrc: Temporal   SpO2: 95%   Weight: 157 lb 6.4 oz (71.4 kg)        Body mass index is 30.74 kg/m². Patient is overweight    Orders Placed This Encounter   Procedures    CBC Auto Differential     Standing Status:   Future     Standing Expiration Date:   3/26/2022    Hemoglobin A1C     Standing Status:   Future     Standing Expiration Date:   3/26/2022    Basic Metabolic Panel     Standing Status:   Future     Standing Expiration Date:   3/26/2022    VITAMIN B12     Standing Status:   Future     Standing Expiration Date:   3/26/2022        EXAM   Physical Exam  Vitals signs and nursing note reviewed. Constitutional:       Appearance: Normal appearance. She is well-developed. Comments: overweight   HENT:      Right Ear: Tympanic membrane, ear canal and external ear normal.      Left Ear: Tympanic membrane, ear canal and external ear normal.      Nose: Congestion present. Mouth/Throat:      Pharynx: Oropharynx is clear. No posterior oropharyngeal erythema. Eyes:      General: No scleral icterus. Conjunctiva/sclera: Conjunctivae normal.   Neck:      Musculoskeletal: Normal range of motion and neck supple. Thyroid: No thyroid mass or thyromegaly. Vascular: No carotid bruit or JVD. Trachea: Trachea normal.   Cardiovascular:      Rate and Rhythm: Normal rate and regular rhythm. Pulses: Normal pulses. Heart sounds: Normal heart sounds. No murmur. No gallop. Pulmonary:      Effort: Pulmonary effort is normal.      Breath sounds: Normal breath sounds. No wheezing, rhonchi or rales. Abdominal:      General: Bowel sounds are normal. There is no distension. Palpations: Abdomen is soft. There is no mass. Tenderness: There is no abdominal tenderness. There is no guarding.       Hernia: No hernia is present. Musculoskeletal: Normal range of motion. General: No swelling. Right lower leg: No edema. Left lower leg: No edema. Lymphadenopathy:      Cervical: No cervical adenopathy. Skin:     General: Skin is warm and dry. Coloration: Skin is not jaundiced. Findings: No bruising or rash. Neurological:      General: No focal deficit present. Mental Status: She is alert and oriented to person, place, and time. Sensory: No sensory deficit. Motor: Weakness present. No abnormal muscle tone. Coordination: Coordination normal.      Gait: Gait normal.   Psychiatric:         Mood and Affect: Mood normal.         Behavior: Behavior normal.      Comments: Failed MMSE (scored 18), very upset that we believe she has a problem with her memory. Tried to frame it that we would try to help her with this if we could. She was tearful and upset. Judgement and insight poor           Diagnoses and all orders for this visit:    Dementia without behavioral disturbance, unspecified dementia type (HCC)  -     CBC Auto Differential; Future  -     Basic Metabolic Panel; Future  -     VITAMIN B12; Future  -     donepezil (ARICEPT) 5 MG tablet; Take 1 tablet by mouth nightly  TSH current, would like to order CT will have to wait till next opportunity  Cardiomyopathy, nonischemic (HCC)  -     losartan (COZAAR) 50 MG tablet; Take 1 tablet by mouth daily  -     nebivolol (BYSTOLIC) 2.5 MG tablet; Take 3 tablets by mouth daily  -     glipiZIDE (GLUCOTROL) 10 MG tablet; Take 1 tablet by mouth 2 times daily (before meals)  -     hydroCHLOROthiazide (MICROZIDE) 12.5 MG capsule; Take 1 capsule by mouth daily  -     Basic Metabolic Panel; Future  Seems fairly compensated to me. Not wheezing today  Primary osteoarthritis involving multiple joints  -     Discontinue: ibuprofen (ADVIL;MOTRIN) 800 MG tablet; Take 1 tablet by mouth daily as needed (for arthritis pain)  Needs to stop this. Stage III renal disease  Diabetes 1.5, managed as type 2 (HCC)  -     nebivolol (BYSTOLIC) 2.5 MG tablet; Take 3 tablets by mouth daily  -     glipiZIDE (GLUCOTROL) 10 MG tablet; Take 1 tablet by mouth 2 times daily (before meals)  -     Hemoglobin A1C; Future  -     Basic Metabolic Panel; Future  Control has been fairly good, will recheck A1C        Return in about 3 months (around 6/26/2021). Electronically signed by Francheska Bliss MD on 3/26/21 at 10:26 AM EDT    I have personally reviewed and updated the chief complaint, HPI, Past Medical, Family and Social History, as well as the above Review of Systems.

## 2021-03-27 ENCOUNTER — TELEPHONE (OUTPATIENT)
Dept: FAMILY MEDICINE CLINIC | Age: 86
End: 2021-03-27

## 2021-03-27 DIAGNOSIS — E11.65 TYPE 2 DIABETES MELLITUS WITH HYPERGLYCEMIA, WITHOUT LONG-TERM CURRENT USE OF INSULIN (HCC): Primary | ICD-10-CM

## 2021-03-27 RX ORDER — PIOGLITAZONEHYDROCHLORIDE 30 MG/1
30 TABLET ORAL DAILY
Qty: 30 TABLET | Refills: 3 | Status: SHIPPED
Start: 2021-03-27 | End: 2021-12-21 | Stop reason: SDUPTHER

## 2021-03-27 NOTE — TELEPHONE ENCOUNTER
Blood sugar and A1C indicate DM out of control. This could be do to worsening illness of confusion over meds/missing meds etc.  Want her to stop HCTZ, start pioglitazone which was sent to [facility]. Needs to watch diet better, use a pill organizer.  Would like to have Visiting Nurse come out and do a home assessment and get her organized with this and the rest of her meds if she agrees

## 2021-03-31 ENCOUNTER — TELEPHONE (OUTPATIENT)
Dept: FAMILY MEDICINE CLINIC | Age: 86
End: 2021-03-31

## 2021-03-31 NOTE — TELEPHONE ENCOUNTER
----- Message from Elvira Means sent at 3/31/2021  1:11 PM EDT -----  I was able to speak to [place]. She is very forgetful and said that she did not have labs done, even tho her  was there telling her that she did. She said that she does not have diabetes and is not on any medications for it. I did explain the meds that she is on and the new one. She was still arguing with me. Her  was telling her that I was right and she needs to listen. When I explained how the Visiting Nurses could help her, she reluctantly agreed. When she was done speaking with me, she laid the phone down and began fighting with her . He patiently tried to reason with her. I finally hung up. I believe he will make sure that the visiting nurses are able to come out to see her.

## 2021-04-09 ENCOUNTER — APPOINTMENT (OUTPATIENT)
Dept: CT IMAGING | Age: 86
End: 2021-04-09
Payer: MEDICARE

## 2021-04-09 ENCOUNTER — APPOINTMENT (OUTPATIENT)
Dept: GENERAL RADIOLOGY | Age: 86
End: 2021-04-09
Payer: MEDICARE

## 2021-04-09 ENCOUNTER — HOSPITAL ENCOUNTER (EMERGENCY)
Age: 86
Discharge: HOME | End: 2021-04-09
Attending: EMERGENCY MEDICINE
Payer: MEDICARE

## 2021-04-09 VITALS
DIASTOLIC BLOOD PRESSURE: 68 MMHG | HEART RATE: 78 BPM | TEMPERATURE: 98.1 F | RESPIRATION RATE: 16 BRPM | SYSTOLIC BLOOD PRESSURE: 135 MMHG | OXYGEN SATURATION: 97 %

## 2021-04-09 DIAGNOSIS — E11.65 HYPERGLYCEMIA DUE TO DIABETES MELLITUS (HCC): Primary | ICD-10-CM

## 2021-04-09 DIAGNOSIS — R41.0 CONFUSION: ICD-10-CM

## 2021-04-09 LAB
ALBUMIN SERPL-MCNC: 3.8 G/DL (ref 3.5–5.2)
ALP BLD-CCNC: 103 U/L (ref 35–104)
ALT SERPL-CCNC: 13 U/L (ref 0–32)
ANION GAP SERPL CALCULATED.3IONS-SCNC: 11 MMOL/L (ref 7–16)
AST SERPL-CCNC: 32 U/L (ref 0–31)
BACTERIA: ABNORMAL /HPF
BASOPHILS ABSOLUTE: 0.03 E9/L (ref 0–0.2)
BASOPHILS RELATIVE PERCENT: 0.5 % (ref 0–2)
BETA-HYDROXYBUTYRATE: 0.24 MMOL/L (ref 0.02–0.27)
BILIRUB SERPL-MCNC: 0.3 MG/DL (ref 0–1.2)
BILIRUBIN URINE: NEGATIVE
BLOOD, URINE: NEGATIVE
BUN BLDV-MCNC: 32 MG/DL (ref 8–23)
CALCIUM SERPL-MCNC: 9.9 MG/DL (ref 8.6–10.2)
CHLORIDE BLD-SCNC: 95 MMOL/L (ref 98–107)
CLARITY: ABNORMAL
CO2: 22 MMOL/L (ref 22–29)
COLOR: YELLOW
CREAT SERPL-MCNC: 1.3 MG/DL (ref 0.5–1)
EOSINOPHILS ABSOLUTE: 0.08 E9/L (ref 0.05–0.5)
EOSINOPHILS RELATIVE PERCENT: 1.2 % (ref 0–6)
EPITHELIAL CELLS, UA: ABNORMAL /HPF
GFR AFRICAN AMERICAN: 47
GFR NON-AFRICAN AMERICAN: 39 ML/MIN/1.73
GLUCOSE BLD-MCNC: 322 MG/DL (ref 74–99)
GLUCOSE URINE: >=1000 MG/DL
HCT VFR BLD CALC: 37.2 % (ref 34–48)
HEMOGLOBIN: 12.6 G/DL (ref 11.5–15.5)
IMMATURE GRANULOCYTES #: 0.02 E9/L
IMMATURE GRANULOCYTES %: 0.3 % (ref 0–5)
KETONES, URINE: NEGATIVE MG/DL
LACTIC ACID, SEPSIS: 2.1 MMOL/L (ref 0.5–1.9)
LEUKOCYTE ESTERASE, URINE: ABNORMAL
LYMPHOCYTES ABSOLUTE: 2.69 E9/L (ref 1.5–4)
LYMPHOCYTES RELATIVE PERCENT: 41.6 % (ref 20–42)
MCH RBC QN AUTO: 31.5 PG (ref 26–35)
MCHC RBC AUTO-ENTMCNC: 33.9 % (ref 32–34.5)
MCV RBC AUTO: 93 FL (ref 80–99.9)
METER GLUCOSE: 326 MG/DL (ref 74–99)
MONOCYTES ABSOLUTE: 0.53 E9/L (ref 0.1–0.95)
MONOCYTES RELATIVE PERCENT: 8.2 % (ref 2–12)
NEUTROPHILS ABSOLUTE: 3.12 E9/L (ref 1.8–7.3)
NEUTROPHILS RELATIVE PERCENT: 48.2 % (ref 43–80)
NITRITE, URINE: NEGATIVE
PDW BLD-RTO: 11.9 FL (ref 11.5–15)
PH UA: 5.5 (ref 5–9)
PH VENOUS: 7.38 (ref 7.35–7.45)
PLATELET # BLD: 224 E9/L (ref 130–450)
PMV BLD AUTO: 10 FL (ref 7–12)
POTASSIUM REFLEX MAGNESIUM: 5.2 MMOL/L (ref 3.5–5)
PROTEIN UA: ABNORMAL MG/DL
RBC # BLD: 4 E12/L (ref 3.5–5.5)
RBC UA: ABNORMAL /HPF (ref 0–2)
SODIUM BLD-SCNC: 128 MMOL/L (ref 132–146)
SPECIFIC GRAVITY UA: 1.02 (ref 1–1.03)
TOTAL PROTEIN: 7.2 G/DL (ref 6.4–8.3)
TROPONIN: <0.01 NG/ML (ref 0–0.03)
UROBILINOGEN, URINE: 0.2 E.U./DL
WBC # BLD: 6.5 E9/L (ref 4.5–11.5)
WBC UA: ABNORMAL /HPF (ref 0–5)
YEAST: PRESENT /HPF

## 2021-04-09 PROCEDURE — 82962 GLUCOSE BLOOD TEST: CPT

## 2021-04-09 PROCEDURE — 93005 ELECTROCARDIOGRAM TRACING: CPT | Performed by: STUDENT IN AN ORGANIZED HEALTH CARE EDUCATION/TRAINING PROGRAM

## 2021-04-09 PROCEDURE — 83605 ASSAY OF LACTIC ACID: CPT

## 2021-04-09 PROCEDURE — 70450 CT HEAD/BRAIN W/O DYE: CPT

## 2021-04-09 PROCEDURE — 87088 URINE BACTERIA CULTURE: CPT

## 2021-04-09 PROCEDURE — 71045 X-RAY EXAM CHEST 1 VIEW: CPT

## 2021-04-09 PROCEDURE — 99283 EMERGENCY DEPT VISIT LOW MDM: CPT

## 2021-04-09 PROCEDURE — 85025 COMPLETE CBC W/AUTO DIFF WBC: CPT

## 2021-04-09 PROCEDURE — 82800 BLOOD PH: CPT

## 2021-04-09 PROCEDURE — 81001 URINALYSIS AUTO W/SCOPE: CPT

## 2021-04-09 PROCEDURE — 84484 ASSAY OF TROPONIN QUANT: CPT

## 2021-04-09 PROCEDURE — 80053 COMPREHEN METABOLIC PANEL: CPT

## 2021-04-09 PROCEDURE — 82010 KETONE BODYS QUAN: CPT

## 2021-04-10 LAB
EKG ATRIAL RATE: 74 BPM
EKG P AXIS: 28 DEGREES
EKG P-R INTERVAL: 146 MS
EKG Q-T INTERVAL: 422 MS
EKG QRS DURATION: 120 MS
EKG QTC CALCULATION (BAZETT): 468 MS
EKG R AXIS: 32 DEGREES
EKG T AXIS: -2 DEGREES
EKG VENTRICULAR RATE: 74 BPM

## 2021-04-10 PROCEDURE — 93010 ELECTROCARDIOGRAM REPORT: CPT | Performed by: INTERNAL MEDICINE

## 2021-04-10 NOTE — ED PROVIDER NOTES
Patient is an 61-year-old female with a history of diabetes, hypertension who presents to the emergency department with her  for elevated blood sugar. Patient states she is asymptomatic, does not know why she is at the hospital.  Spoke with patient's  at bedside who states that patient had an elevated blood sugar at home. He states she has some aspect of dementia and has been very difficult at home. She does control her own medications although he does not know if she is compliant. He states that they have arguments regarding her care. She was seen by Dr. Jagjit Bradley recently. Patient presents with blood sugar level at home of 411, which is acute change from previous days. Patient has no trauma, has not been complaining of recent illness. Patient denies any chest pain, shortness of breath, headache, nausea, vomiting, diarrhea.  does state that her confusion is progressing, is more pronounced today. Review of Systems   Constitutional: Negative for chills and fever. HENT: Negative for congestion and rhinorrhea. Eyes: Negative for visual disturbance. Respiratory: Negative for cough and shortness of breath. Cardiovascular: Negative for chest pain and palpitations. Gastrointestinal: Negative for abdominal pain, constipation, diarrhea, nausea and vomiting. Endocrine: Negative for polyuria. Genitourinary: Negative for dysuria, frequency and urgency. Musculoskeletal: Negative for arthralgias, back pain, myalgias and neck pain. Skin: Negative for rash and wound. Neurological: Negative for dizziness, syncope, weakness, light-headedness and numbness. Psychiatric/Behavioral: Positive for confusion. The patient is not nervous/anxious. Physical Exam  Vitals signs and nursing note reviewed. Constitutional:       General: She is awake. She is not in acute distress. Appearance: She is normal weight. She is not ill-appearing or toxic-appearing.    HENT: Head: Normocephalic and atraumatic. Right Ear: Tympanic membrane normal.      Left Ear: Tympanic membrane normal.      Nose: Nose normal.      Mouth/Throat:      Mouth: Mucous membranes are moist.      Pharynx: Oropharynx is clear. Eyes:      Extraocular Movements: Extraocular movements intact. Conjunctiva/sclera: Conjunctivae normal.      Pupils: Pupils are equal, round, and reactive to light. Neck:      Musculoskeletal: Normal range of motion and neck supple. Cardiovascular:      Rate and Rhythm: Normal rate and regular rhythm. Pulses: Normal pulses. Heart sounds: Normal heart sounds. Pulmonary:      Effort: Pulmonary effort is normal.      Breath sounds: Normal breath sounds. Abdominal:      General: Bowel sounds are normal. There is no distension. Palpations: Abdomen is soft. Tenderness: There is no abdominal tenderness. Musculoskeletal: Normal range of motion. Skin:     General: Skin is warm and dry. Capillary Refill: Capillary refill takes less than 2 seconds. Neurological:      General: No focal deficit present. Mental Status: She is alert. GCS: GCS eye subscore is 4. GCS verbal subscore is 4. GCS motor subscore is 6. Cranial Nerves: Cranial nerves are intact. Sensory: Sensation is intact. Motor: Motor function is intact. Psychiatric:         Behavior: Behavior is cooperative. MDM  Number of Diagnoses or Management Options  Confusion  Hyperglycemia due to diabetes mellitus [facility])  Diagnosis management comments: Patient is an 66-year-old female who presents to the emergency department with her , patient has no complaints. Patient does have some baseline confusion and was brought in by her  for an elevated blood sugar at home. There is concerns by the  that her confusion is evolving. She seems more confused for him today and possibly due to elevated blood sugar. Patient with otherwise asymptomatic. Patient has not any trauma. Patient presents awake, alert, confused, which  reports is generally at baseline however possibly more pronounced. Patient hemodynamically stable and neurologically intact. BGL was in the 300s. Physical exam is unremarkable. Work-up including labs shows no leukocytosis or anemia no suspected infection. Creatinine elevated but at baseline. Labs not consistent with diabetic ketoacidosis, patient is a type II. Urinalysis not consistent with urinary tract infection or dehydration. EKG shows atrial paced rhythm without ectopy or signs of ischemia. Scans including CT head and chest x-ray are unremarkable for new pathology. Patient was monitored in the emergency department no change. Patient with no acute symptoms, no signs of infection, no signs of chest pain or cardiac etiology, no pulmonary etiology. Discussion of results with  and patient with plan for follow-up with PCP as needed. Patient given return instructions. Extensive discussion was had with  as well regarding close follow-up with PCP for monitoring of her confusion as well as monitoring and treatment of her diabetes. Amount and/or Complexity of Data Reviewed  Clinical lab tests: ordered and reviewed  Tests in the radiology section of CPT®: ordered and reviewed         --------------------------------------------- PAST HISTORY ---------------------------------------------  Past Medical History:  has a past medical history of Arthritis, Asthma, CAD (coronary artery disease), Diabetes mellitus ([facility] [address] GERD (gastroesophageal reflux disease), Hyperlipidemia, Hypertension, ICD (implantable cardiac defibrillator) battery depletion, LBBB (left bundle branch block), and Nonischemic cardiomyopathy ([facility] [address] Past Surgical History:  has a past surgical history that includes Cataract removal with implant (2010); Retinal detachment surgery (1998);  Carpal tunnel release; Diagnostic Cardiac Cath Lab Procedure (3/23/10); Cardiac defibrillator placement (09/01/2011); and Cardiac defibrillator placement (Left, 05/09/2017). Social History:  reports that she has never smoked. She has never used smokeless tobacco. She reports that she does not drink alcohol or use drugs. Family History: family history includes Cancer in her father. The patients home medications have been reviewed.     Allergies: Codeine and Statins    -------------------------------------------------- RESULTS -------------------------------------------------  Labs:  Results for orders placed or performed during the hospital encounter of 04/09/21   CBC Auto Differential   Result Value Ref Range    WBC 6.5 4.5 - 11.5 E9/L    RBC 4.00 3.50 - 5.50 E12/L    Hemoglobin 12.6 11.5 - 15.5 g/dL    Hematocrit 37.2 34.0 - 48.0 %    MCV 93.0 80.0 - 99.9 fL    MCH 31.5 26.0 - 35.0 pg    MCHC 33.9 32.0 - 34.5 %    RDW 11.9 11.5 - 15.0 fL    Platelets 855 012 - 314 E9/L    MPV 10.0 7.0 - 12.0 fL    Neutrophils % 48.2 43.0 - 80.0 %    Immature Granulocytes % 0.3 0.0 - 5.0 %    Lymphocytes % 41.6 20.0 - 42.0 %    Monocytes % 8.2 2.0 - 12.0 %    Eosinophils % 1.2 0.0 - 6.0 %    Basophils % 0.5 0.0 - 2.0 %    Neutrophils Absolute 3.12 1.80 - 7.30 E9/L    Immature Granulocytes # 0.02 E9/L    Lymphocytes Absolute 2.69 1.50 - 4.00 E9/L    Monocytes Absolute 0.53 0.10 - 0.95 E9/L    Eosinophils Absolute 0.08 0.05 - 0.50 E9/L    Basophils Absolute 0.03 0.00 - 0.20 E9/L   Comprehensive Metabolic Panel w/ Reflex to MG   Result Value Ref Range    Sodium 128 (L) 132 - 146 mmol/L    Potassium reflex Magnesium 5.2 (H) 3.5 - 5.0 mmol/L    Chloride 95 (L) 98 - 107 mmol/L    CO2 22 22 - 29 mmol/L    Anion Gap 11 7 - 16 mmol/L    Glucose 322 (H) 74 - 99 mg/dL    BUN 32 (H) 8 - 23 mg/dL    CREATININE 1.3 (H) 0.5 - 1.0 mg/dL    GFR Non-African American 39 >=60 mL/min/1.73    GFR African American 47     Calcium 9.9 8.6 - 10.2 mg/dL    Total Protein 7.2 6.4 - 8.3 g/dL    Albumin stable as compared to patient's most recent EKG     ------------------------- NURSING NOTES AND VITALS REVIEWED ---------------------------  Date / Time Roomed:  4/9/2021  6:17 PM  ED Bed Assignment:  08/08    The nursing notes within the ED encounter and vital signs as below have been reviewed. /68   Pulse 78   Temp 98.1 °F (36.7 °C)   Resp 16   SpO2 97%   Oxygen Saturation Interpretation: Normal      ------------------------------------------ PROGRESS NOTES ------------------------------------------  12:10 PM EDT  I have spoken with the patient and  and discussed todays results, in addition to providing specific details for the plan of care and counseling regarding the diagnosis and prognosis. Their questions are answered at this time and they are agreeable with the plan. I discussed at length with them reasons for immediate return here for re evaluation. They will followup with their primary care physician by calling their office as needed. --------------------------------- ADDITIONAL PROVIDER NOTES ---------------------------------  At this time the patient is without objective evidence of an acute process requiring hospitalization or inpatient management. They have remained hemodynamically stable throughout their entire ED visit and are stable for discharge with outpatient follow-up. The plan has been discussed in detail and they are aware of the specific conditions for emergent return, as well as the importance of follow-up. Discharge Medication List as of 4/9/2021  8:59 PM        Diagnosis:  1. Hyperglycemia due to diabetes mellitus ([facility] [address]    2. Confusion      Disposition:  Patient's disposition: Discharge to home  Patient's condition is stable. 4/10/21, 12:10 PM EDT.     This note is prepared by Yudelka Lake MD -PGY-2       Yudelka Lake MD  Resident  04/13/21 [id number]

## 2021-04-11 LAB — URINE CULTURE, ROUTINE: NORMAL

## 2021-04-13 ASSESSMENT — ENCOUNTER SYMPTOMS
RHINORRHEA: 0
BACK PAIN: 0
COUGH: 0
SHORTNESS OF BREATH: 0
VOMITING: 0
NAUSEA: 0
CONSTIPATION: 0
DIARRHEA: 0
ABDOMINAL PAIN: 0

## 2021-04-27 ENCOUNTER — OFFICE VISIT (OUTPATIENT)
Dept: FAMILY MEDICINE CLINIC | Age: 86
End: 2021-04-27
Payer: MEDICARE

## 2021-04-27 VITALS
OXYGEN SATURATION: 96 % | HEART RATE: 89 BPM | SYSTOLIC BLOOD PRESSURE: 122 MMHG | DIASTOLIC BLOOD PRESSURE: 68 MMHG | TEMPERATURE: 97.6 F

## 2021-04-27 DIAGNOSIS — J45.20 MILD INTERMITTENT ASTHMA, UNSPECIFIED WHETHER COMPLICATED: ICD-10-CM

## 2021-04-27 DIAGNOSIS — F43.21 GRIEF REACTION: Primary | ICD-10-CM

## 2021-04-27 PROCEDURE — 99213 OFFICE O/P EST LOW 20 MIN: CPT | Performed by: FAMILY MEDICINE

## 2021-04-27 RX ORDER — ALBUTEROL SULFATE 90 UG/1
2 AEROSOL, METERED RESPIRATORY (INHALATION) 4 TIMES DAILY PRN
Qty: 1 INHALER | Refills: 1 | Status: SHIPPED | OUTPATIENT
Start: 2021-04-27

## 2021-04-27 RX ORDER — LORAZEPAM 0.5 MG/1
TABLET ORAL
Qty: 4 TABLET | Refills: 0 | Status: SHIPPED
Start: 2021-04-27 | End: 2022-03-03 | Stop reason: SDUPTHER

## 2021-04-27 RX ORDER — IBUPROFEN 800 MG/1
TABLET ORAL
Status: ON HOLD | COMMUNITY
Start: 2021-03-26 | End: 2021-06-15 | Stop reason: HOSPADM

## 2021-04-27 NOTE — PROGRESS NOTES
OFFICE NOTE    4/27/21  Name: Yessy Ryan  Health system:[facility]   Sex:female   Age:88 y.o. SUBJECTIVE  Chief Complaint   Patient presents with    Diabetes     FBSS 56    Depression    Dementia       HPI  passed quietly but suddenly. Was covering for her dementia. Family has arrived, niece staying with her    Review of Systems   Constitutional: Positive for appetite change and fatigue. Negative for fever and unexpected weight change. HENT: Negative for congestion, ear pain, postnasal drip and sinus pressure. Eyes: Negative. Negative for photophobia, redness and visual disturbance. Respiratory: Negative for cough, chest tightness, shortness of breath and wheezing. Cardiovascular: Negative for chest pain, palpitations and leg swelling. Gastrointestinal: Negative for abdominal pain, blood in stool, constipation, diarrhea and vomiting. Endocrine: Negative for cold intolerance, polydipsia and polyuria. Genitourinary: Positive for urgency. Negative for dysuria and hematuria. Musculoskeletal: Positive for arthralgias and back pain. Negative for gait problem and joint swelling. Skin: Negative for rash and wound. Allergic/Immunologic: Negative for environmental allergies and food allergies. Neurological: Negative for dizziness, tremors, seizures, weakness, numbness and headaches. Hematological: Negative for adenopathy. Does not bruise/bleed easily. Psychiatric/Behavioral: Positive for confusion. Negative for behavioral problems, dysphoric mood and sleep disturbance. The patient is nervous/anxious.              Current Outpatient Medications:     ibuprofen (ADVIL;MOTRIN) 800 MG tablet, , Disp: , Rfl:     pioglitazone (ACTOS) 30 MG tablet, Take 1 tablet by mouth daily, Disp: 30 tablet, Rfl: 3    losartan (COZAAR) 50 MG tablet, Take 1 tablet by mouth daily, Disp: 90 tablet, Rfl: 1    nebivolol (BYSTOLIC) 2.5 MG tablet, Take 3 tablets by mouth daily, Disp: 270 tablet, Rfl: 1   Control/Protection  Post-menopausal                OBJECTIVE  Vitals:    04/27/21 1552   BP: 122/68   Site: Left Upper Arm   Position: Sitting   Pulse: 89   Temp: 97.6 °F (36.4 °C)   TempSrc: Temporal   SpO2: 96%        There is no height or weight on file to calculate BMI. No orders of the defined types were placed in this encounter. EXAM   Physical Exam  Vitals signs and nursing note reviewed. Constitutional:       Appearance: Normal appearance. Comments: Overweight. HENT:      Right Ear: Tympanic membrane normal.      Left Ear: Tympanic membrane normal.      Nose: No congestion. Mouth/Throat:      Pharynx: Oropharynx is clear. No posterior oropharyngeal erythema. Eyes:      Conjunctiva/sclera: Conjunctivae normal.      Pupils: Pupils are equal, round, and reactive to light. Neck:      Vascular: No carotid bruit. Cardiovascular:      Rate and Rhythm: Normal rate and regular rhythm. Heart sounds: Murmur present. Pulmonary:      Effort: Pulmonary effort is normal.      Breath sounds: Normal breath sounds. No wheezing or rales. Abdominal:      General: Bowel sounds are normal.      Tenderness: There is no abdominal tenderness. Musculoskeletal:         General: No swelling. Right lower leg: Edema present. Left lower leg: Edema present. Lymphadenopathy:      Cervical: No cervical adenopathy. Skin:     Coloration: Skin is not jaundiced. Findings: No bruising. Neurological:      General: No focal deficit present. Mental Status: She is alert. She is disoriented. Psychiatric:      Comments: Anxious. Clear cognitive decline. Will give her low dose ativan for next several days. Will come back in in 1-2 weeks and will need to discuss Assisted Living           There are no diagnoses linked to this encounter. No follow-ups on file.     Electronically signed by Madyson Bergman MD on 4/27/21 at 4:24 PM EDT

## 2021-04-28 ASSESSMENT — ENCOUNTER SYMPTOMS
EYES NEGATIVE: 1
COUGH: 0
VOMITING: 0
SHORTNESS OF BREATH: 0
PHOTOPHOBIA: 0
WHEEZING: 0
BLOOD IN STOOL: 0
EYE REDNESS: 0
DIARRHEA: 0
ABDOMINAL PAIN: 0
CHEST TIGHTNESS: 0
CONSTIPATION: 0
BACK PAIN: 1
SINUS PRESSURE: 0

## 2021-05-03 ENCOUNTER — OFFICE VISIT (OUTPATIENT)
Dept: FAMILY MEDICINE CLINIC | Age: 86
End: 2021-05-03
Payer: MEDICARE

## 2021-05-03 VITALS
DIASTOLIC BLOOD PRESSURE: 86 MMHG | OXYGEN SATURATION: 98 % | SYSTOLIC BLOOD PRESSURE: 128 MMHG | TEMPERATURE: 97.1 F | HEART RATE: 91 BPM

## 2021-05-03 DIAGNOSIS — I42.8 CARDIOMYOPATHY, NONISCHEMIC (HCC): Primary | ICD-10-CM

## 2021-05-03 DIAGNOSIS — G30.1 LATE ONSET ALZHEIMER'S DISEASE WITH BEHAVIORAL DISTURBANCE (HCC): ICD-10-CM

## 2021-05-03 DIAGNOSIS — I25.10 CORONARY ARTERY DISEASE INVOLVING NATIVE CORONARY ARTERY OF NATIVE HEART WITHOUT ANGINA PECTORIS: ICD-10-CM

## 2021-05-03 DIAGNOSIS — F02.818 LATE ONSET ALZHEIMER'S DISEASE WITH BEHAVIORAL DISTURBANCE (HCC): ICD-10-CM

## 2021-05-03 DIAGNOSIS — J45.20 MILD INTERMITTENT ASTHMA WITHOUT COMPLICATION: ICD-10-CM

## 2021-05-03 DIAGNOSIS — E13.9 DIABETES 1.5, MANAGED AS TYPE 2 (HCC): ICD-10-CM

## 2021-05-03 PROBLEM — J45.40 MODERATE PERSISTENT ASTHMA WITHOUT COMPLICATION: Status: RESOLVED | Noted: 2019-11-05 | Resolved: 2021-05-03

## 2021-05-03 PROCEDURE — 99213 OFFICE O/P EST LOW 20 MIN: CPT | Performed by: FAMILY MEDICINE

## 2021-05-03 RX ORDER — DONEPEZIL HYDROCHLORIDE 10 MG/1
10 TABLET, FILM COATED ORAL NIGHTLY
Qty: 30 TABLET | Refills: 3 | Status: SHIPPED
Start: 2021-05-03 | End: 2021-08-24 | Stop reason: SDUPTHER

## 2021-05-03 ASSESSMENT — ENCOUNTER SYMPTOMS
ABDOMINAL PAIN: 0
CHEST TIGHTNESS: 0
COUGH: 0
DIARRHEA: 0
CONSTIPATION: 0
VOMITING: 0
BLOOD IN STOOL: 0
WHEEZING: 0
EYES NEGATIVE: 1

## 2021-05-03 NOTE — PROGRESS NOTES
OFFICE NOTE    5/3/21  Name: Darius MICHAELZ:[birth year]   Sex:female   Age:88 y.o. SUBJECTIVE  Chief Complaint   Patient presents with    Dementia     still having issues with taking medications, niece left but nephew staying       Patient presents for routine follow up. Services for  went well. Niece returned to [place], nephew is staying with her now. Reports not much improvement with taking medications despite pill organizer. Didn't wear hearing aids today so difficult to communicate. She agrees house is too much for her, seemed amenable to Assisted Living. Multiple children share in POA dated 1996, needs to be updated. [place] has dementia but still has some cognitive abilities. Review of Systems   Constitutional: Negative for appetite change, fever and unexpected weight change. HENT: Positive for hearing loss. Negative for congestion, ear pain and postnasal drip. Eyes: Negative. Respiratory: Negative for cough, chest tightness and wheezing. Cardiovascular: Negative for chest pain and palpitations. Gastrointestinal: Negative for abdominal pain, blood in stool, constipation, diarrhea and vomiting. Endocrine: Negative for cold intolerance, polydipsia and polyuria. Genitourinary: Negative for dysuria and hematuria. Musculoskeletal: Negative for arthralgias, gait problem and joint swelling. Skin: Negative for rash and wound. Allergic/Immunologic: Negative for environmental allergies and food allergies. Neurological: Negative for dizziness, tremors, weakness and headaches. Hematological: Negative for adenopathy. Does not bruise/bleed easily. Psychiatric/Behavioral: Positive for confusion. Negative for behavioral problems, dysphoric mood and sleep disturbance.             Current Outpatient Medications:     ibuprofen (ADVIL;MOTRIN) 800 MG tablet, , Disp: , Rfl:     LORazepam (ATIVAN) 0.5 MG tablet, Take 1 tablet tonight and bid if needed tomorrow and one 2010    DIAGNOSTIC CARDIAC CATH LAB PROCEDURE  3/23/10    Dr. Marimar Cortés    left eye     Family History   Problem Relation Age of Onset    Cancer Father      Social History     Tobacco History     Smoking Status  Never Smoker    Smokeless Tobacco Use  Never Used          Alcohol History     Alcohol Use Status  No          Drug Use     Drug Use Status  No          Sexual Activity     Sexually Active  Not Currently Partners  Male Birth Control/Protection  Post-menopausal              OBJECTIVE  Vitals:    05/03/21 0918   BP: 128/86   Site: Right Upper Arm   Position: Sitting   Pulse: 91   Temp: 97.1 °F (36.2 °C)   TempSrc: Temporal   SpO2: 98%        There is no height or weight on file to calculate BMI. Patient is overweight    No orders of the defined types were placed in this encounter. EXAM   Physical Exam  Vitals signs and nursing note reviewed. Constitutional:       Appearance: Normal appearance. Comments: overweight   HENT:      Right Ear: Tympanic membrane normal.      Left Ear: Tympanic membrane normal.      Mouth/Throat:      Pharynx: Oropharynx is clear. Neck:      Vascular: No carotid bruit. Cardiovascular:      Rate and Rhythm: Normal rate and regular rhythm. Heart sounds: Murmur present. Pulmonary:      Effort: Pulmonary effort is normal.      Breath sounds: Normal breath sounds. No wheezing or rales. Abdominal:      General: Bowel sounds are normal.      Palpations: There is no mass. Tenderness: There is no abdominal tenderness. Lymphadenopathy:      Cervical: No cervical adenopathy. Skin:     Coloration: Skin is not jaundiced. Findings: No bruising or rash. Neurological:      General: No focal deficit present. Mental Status: She is alert. She is disoriented. Sensory: No sensory deficit. Motor: No weakness.       Coordination: Coordination normal.      Gait: Gait normal.   Psychiatric:         Mood and Affect: Mood normal.         Behavior: Behavior normal.           [place] was seen today for dementia. Diagnoses and all orders for this visit:    Cardiomyopathy, nonischemic ([facility] [address]  Seems stable and assymptomatic at this time    Coronary artery disease involving native coronary artery of native heart without angina pectoris  Same as above. Compensated on current meds  Mild intermittent asthma without complication  Did have significant issues with asthma in past, appears to have resolved  Diabetes 1.5, managed as type 2 ([address]  Reviewed BS log. 140-150 mostly. A1C in 3 mos should be better if continues meds    Late onset Alzheimer's disease with behavioral disturbance ([facility] [address]  Should not live alone, clearly can't handle ADLs and taking her meds. Assisted Living probably best option. Will need HHA at least 12 hours a day until this can be set up. As before POA needs to be clarified and updated          No follow-ups on file. Electronically signed by Amaury Ohara MD on 5/3/21 at 10:04 AM EDT    I have personally reviewed and updated the chief complaint, HPI, Past Medical, Family and Social History, as well as the above Review of Systems.

## 2021-05-04 ENCOUNTER — TELEPHONE (OUTPATIENT)
Dept: FAMILY MEDICINE CLINIC | Age: 86
End: 2021-05-04

## 2021-05-04 NOTE — TELEPHONE ENCOUNTER
Leandra Paris [phone] in [facility] near [address] of [address] [place] with [phone]

## 2021-05-04 NOTE — TELEPHONE ENCOUNTER
[place]'s niece, Carla Vasquez called in and stated that at [place]'s last appointment you recommended a  from the [facility] area that can help with living [place] and trusts. [place] could not remember the 's name and Carla Vasquez was not at the appointment. Do you remember the name of the  that you would recommend?

## 2021-05-14 RX ORDER — BLOOD-GLUCOSE METER
EACH MISCELLANEOUS
Qty: 1 KIT | Refills: 0 | Status: SHIPPED
Start: 2021-05-14 | End: 2021-06-30

## 2021-06-01 ENCOUNTER — TELEPHONE (OUTPATIENT)
Dept: FAMILY MEDICINE CLINIC | Age: 86
End: 2021-06-01

## 2021-06-01 DIAGNOSIS — F43.21 ADJUSTMENT DISORDER WITH DEPRESSED MOOD: Primary | ICD-10-CM

## 2021-06-01 RX ORDER — ESCITALOPRAM OXALATE 10 MG/1
10 TABLET ORAL DAILY
Qty: 30 TABLET | Refills: 5 | Status: SHIPPED
Start: 2021-06-01 | End: 2021-08-09 | Stop reason: SDUPTHER

## 2021-06-01 NOTE — TELEPHONE ENCOUNTER
Charlene Jose informed, will call in if no improvement in 1 month to move Aug appt up (ok'd by dr Genie Rojas)

## 2021-06-01 NOTE — TELEPHONE ENCOUNTER
Regine Carter called asking if you would prescribe something for depression? Pt recently lost her  about 5 weeks ago.

## 2021-06-13 ENCOUNTER — APPOINTMENT (OUTPATIENT)
Dept: CT IMAGING | Age: 86
DRG: 439 | End: 2021-06-13
Payer: MEDICARE

## 2021-06-13 ENCOUNTER — HOSPITAL ENCOUNTER (INPATIENT)
Age: 86
LOS: 3 days | Discharge: NURSING, REHAB OR LONG-TERM CARE | DRG: 439 | End: 2021-06-16
Attending: EMERGENCY MEDICINE | Admitting: INTERNAL MEDICINE
Payer: MEDICARE

## 2021-06-13 DIAGNOSIS — E86.0 DEHYDRATION: ICD-10-CM

## 2021-06-13 DIAGNOSIS — N17.9 AKI (ACUTE KIDNEY INJURY) (HCC): Primary | ICD-10-CM

## 2021-06-13 DIAGNOSIS — R41.82 ALTERED MENTAL STATUS, UNSPECIFIED ALTERED MENTAL STATUS TYPE: ICD-10-CM

## 2021-06-13 DIAGNOSIS — I50.9 CONGESTIVE HEART FAILURE, UNSPECIFIED HF CHRONICITY, UNSPECIFIED HEART FAILURE TYPE (HCC): ICD-10-CM

## 2021-06-13 LAB
ALBUMIN SERPL-MCNC: 4.2 G/DL (ref 3.5–5.2)
ALP BLD-CCNC: 71 U/L (ref 35–104)
ALT SERPL-CCNC: 12 U/L (ref 0–32)
ANION GAP SERPL CALCULATED.3IONS-SCNC: 12 MMOL/L (ref 7–16)
AST SERPL-CCNC: 21 U/L (ref 0–31)
BACTERIA: NORMAL /HPF
BASOPHILS ABSOLUTE: 0.02 E9/L (ref 0–0.2)
BASOPHILS RELATIVE PERCENT: 0.4 % (ref 0–2)
BILIRUB SERPL-MCNC: 0.3 MG/DL (ref 0–1.2)
BILIRUBIN URINE: NEGATIVE
BLOOD, URINE: ABNORMAL
BUN BLDV-MCNC: 46 MG/DL (ref 6–23)
CALCIUM SERPL-MCNC: 10.2 MG/DL (ref 8.6–10.2)
CHLORIDE BLD-SCNC: 95 MMOL/L (ref 98–107)
CHP ED QC CHECK: YES
CLARITY: CLEAR
CO2: 20 MMOL/L (ref 22–29)
COLOR: YELLOW
CREAT SERPL-MCNC: 1.9 MG/DL (ref 0.5–1)
EOSINOPHILS ABSOLUTE: 0.06 E9/L (ref 0.05–0.5)
EOSINOPHILS RELATIVE PERCENT: 1.1 % (ref 0–6)
EPITHELIAL CELLS, UA: NORMAL /HPF
GFR AFRICAN AMERICAN: 30
GFR NON-AFRICAN AMERICAN: 25 ML/MIN/1.73
GLUCOSE BLD-MCNC: 159 MG/DL (ref 74–99)
GLUCOSE BLD-MCNC: 171 MG/DL
GLUCOSE URINE: NEGATIVE MG/DL
HCT VFR BLD CALC: 36.2 % (ref 34–48)
HEMOGLOBIN: 12.3 G/DL (ref 11.5–15.5)
HYALINE CASTS: NORMAL /LPF (ref 0–2)
IMMATURE GRANULOCYTES #: 0.01 E9/L
IMMATURE GRANULOCYTES %: 0.2 % (ref 0–5)
KETONES, URINE: NEGATIVE MG/DL
LACTIC ACID: 1.9 MMOL/L (ref 0.5–2.2)
LEUKOCYTE ESTERASE, URINE: ABNORMAL
LIPASE: 552 U/L (ref 13–60)
LYMPHOCYTES ABSOLUTE: 1.94 E9/L (ref 1.5–4)
LYMPHOCYTES RELATIVE PERCENT: 34.6 % (ref 20–42)
MCH RBC QN AUTO: 31.8 PG (ref 26–35)
MCHC RBC AUTO-ENTMCNC: 34 % (ref 32–34.5)
MCV RBC AUTO: 93.5 FL (ref 80–99.9)
METER GLUCOSE: 171 MG/DL (ref 74–99)
METER GLUCOSE: 48 MG/DL (ref 74–99)
METER GLUCOSE: 71 MG/DL (ref 74–99)
MONOCYTES ABSOLUTE: 0.55 E9/L (ref 0.1–0.95)
MONOCYTES RELATIVE PERCENT: 9.8 % (ref 2–12)
NEUTROPHILS ABSOLUTE: 3.02 E9/L (ref 1.8–7.3)
NEUTROPHILS RELATIVE PERCENT: 53.9 % (ref 43–80)
NITRITE, URINE: NEGATIVE
PDW BLD-RTO: 13.4 FL (ref 11.5–15)
PH UA: 5.5 (ref 5–9)
PLATELET # BLD: 251 E9/L (ref 130–450)
PMV BLD AUTO: 10 FL (ref 7–12)
POTASSIUM REFLEX MAGNESIUM: 4.2 MMOL/L (ref 3.5–5)
PRO-BNP: 1336 PG/ML (ref 0–450)
PROTEIN UA: NEGATIVE MG/DL
RBC # BLD: 3.87 E12/L (ref 3.5–5.5)
RBC UA: NORMAL /HPF (ref 0–2)
SODIUM BLD-SCNC: 127 MMOL/L (ref 132–146)
SPECIFIC GRAVITY UA: 1.02 (ref 1–1.03)
TOTAL PROTEIN: 7.1 G/DL (ref 6.4–8.3)
TROPONIN, HIGH SENSITIVITY: 26 NG/L (ref 0–9)
UROBILINOGEN, URINE: 0.2 E.U./DL
WBC # BLD: 5.6 E9/L (ref 4.5–11.5)
WBC UA: NORMAL /HPF (ref 0–5)

## 2021-06-13 PROCEDURE — 70450 CT HEAD/BRAIN W/O DYE: CPT

## 2021-06-13 PROCEDURE — 2580000003 HC RX 258: Performed by: EMERGENCY MEDICINE

## 2021-06-13 PROCEDURE — 82962 GLUCOSE BLOOD TEST: CPT

## 2021-06-13 PROCEDURE — 80053 COMPREHEN METABOLIC PANEL: CPT

## 2021-06-13 PROCEDURE — 6370000000 HC RX 637 (ALT 250 FOR IP): Performed by: INTERNAL MEDICINE

## 2021-06-13 PROCEDURE — 83880 ASSAY OF NATRIURETIC PEPTIDE: CPT

## 2021-06-13 PROCEDURE — 6360000002 HC RX W HCPCS: Performed by: INTERNAL MEDICINE

## 2021-06-13 PROCEDURE — 85025 COMPLETE CBC W/AUTO DIFF WBC: CPT

## 2021-06-13 PROCEDURE — 83690 ASSAY OF LIPASE: CPT

## 2021-06-13 PROCEDURE — 74176 CT ABD & PELVIS W/O CONTRAST: CPT

## 2021-06-13 PROCEDURE — 1200000000 HC SEMI PRIVATE

## 2021-06-13 PROCEDURE — 83036 HEMOGLOBIN GLYCOSYLATED A1C: CPT

## 2021-06-13 PROCEDURE — 99284 EMERGENCY DEPT VISIT MOD MDM: CPT

## 2021-06-13 PROCEDURE — 84484 ASSAY OF TROPONIN QUANT: CPT

## 2021-06-13 PROCEDURE — 83605 ASSAY OF LACTIC ACID: CPT

## 2021-06-13 PROCEDURE — 81001 URINALYSIS AUTO W/SCOPE: CPT

## 2021-06-13 PROCEDURE — 93005 ELECTROCARDIOGRAM TRACING: CPT | Performed by: EMERGENCY MEDICINE

## 2021-06-13 PROCEDURE — 87088 URINE BACTERIA CULTURE: CPT

## 2021-06-13 RX ORDER — DONEPEZIL HYDROCHLORIDE 5 MG/1
10 TABLET, FILM COATED ORAL NIGHTLY
Status: DISCONTINUED | OUTPATIENT
Start: 2021-06-13 | End: 2021-06-16 | Stop reason: HOSPADM

## 2021-06-13 RX ORDER — 0.9 % SODIUM CHLORIDE 0.9 %
1000 INTRAVENOUS SOLUTION INTRAVENOUS ONCE
Status: COMPLETED | OUTPATIENT
Start: 2021-06-13 | End: 2021-06-13

## 2021-06-13 RX ORDER — DEXTROSE MONOHYDRATE 50 MG/ML
100 INJECTION, SOLUTION INTRAVENOUS PRN
Status: DISCONTINUED | OUTPATIENT
Start: 2021-06-13 | End: 2021-06-16 | Stop reason: HOSPADM

## 2021-06-13 RX ORDER — FUROSEMIDE 10 MG/ML
20 INJECTION INTRAMUSCULAR; INTRAVENOUS DAILY
Status: DISCONTINUED | OUTPATIENT
Start: 2021-06-14 | End: 2021-06-14

## 2021-06-13 RX ORDER — LOSARTAN POTASSIUM 50 MG/1
50 TABLET ORAL DAILY
Status: DISCONTINUED | OUTPATIENT
Start: 2021-06-14 | End: 2021-06-16 | Stop reason: HOSPADM

## 2021-06-13 RX ORDER — ESCITALOPRAM OXALATE 10 MG/1
10 TABLET ORAL DAILY
Status: DISCONTINUED | OUTPATIENT
Start: 2021-06-14 | End: 2021-06-16 | Stop reason: HOSPADM

## 2021-06-13 RX ORDER — NEBIVOLOL 5 MG/1
7.5 TABLET ORAL DAILY
Status: DISCONTINUED | OUTPATIENT
Start: 2021-06-14 | End: 2021-06-16 | Stop reason: HOSPADM

## 2021-06-13 RX ORDER — PIOGLITAZONEHYDROCHLORIDE 15 MG/1
30 TABLET ORAL DAILY
Status: DISCONTINUED | OUTPATIENT
Start: 2021-06-14 | End: 2021-06-16 | Stop reason: HOSPADM

## 2021-06-13 RX ORDER — GLIPIZIDE 5 MG/1
10 TABLET ORAL
Status: DISCONTINUED | OUTPATIENT
Start: 2021-06-14 | End: 2021-06-16 | Stop reason: HOSPADM

## 2021-06-13 RX ORDER — POTASSIUM CHLORIDE 750 MG/1
10 TABLET, EXTENDED RELEASE ORAL 3 TIMES DAILY
Status: DISCONTINUED | OUTPATIENT
Start: 2021-06-13 | End: 2021-06-16 | Stop reason: HOSPADM

## 2021-06-13 RX ORDER — NICOTINE POLACRILEX 4 MG
15 LOZENGE BUCCAL PRN
Status: DISCONTINUED | OUTPATIENT
Start: 2021-06-13 | End: 2021-06-16 | Stop reason: HOSPADM

## 2021-06-13 RX ORDER — DEXTROSE MONOHYDRATE 25 G/50ML
12.5 INJECTION, SOLUTION INTRAVENOUS PRN
Status: DISCONTINUED | OUTPATIENT
Start: 2021-06-13 | End: 2021-06-16 | Stop reason: HOSPADM

## 2021-06-13 RX ADMIN — SODIUM CHLORIDE 1000 ML: 9 INJECTION, SOLUTION INTRAVENOUS at 18:48

## 2021-06-13 RX ADMIN — ENOXAPARIN SODIUM 30 MG: 40 INJECTION SUBCUTANEOUS at 22:33

## 2021-06-13 RX ADMIN — POTASSIUM CHLORIDE 10 MEQ: 750 TABLET, EXTENDED RELEASE ORAL at 22:33

## 2021-06-13 RX ADMIN — DONEPEZIL HYDROCHLORIDE 10 MG: 5 TABLET, FILM COATED ORAL at 22:33

## 2021-06-13 ASSESSMENT — PAIN SCALES - GENERAL
PAINLEVEL_OUTOF10: 0
PAINLEVEL_OUTOF10: 0

## 2021-06-14 ENCOUNTER — APPOINTMENT (OUTPATIENT)
Dept: ULTRASOUND IMAGING | Age: 86
DRG: 439 | End: 2021-06-14
Payer: MEDICARE

## 2021-06-14 ENCOUNTER — APPOINTMENT (OUTPATIENT)
Dept: GENERAL RADIOLOGY | Age: 86
DRG: 439 | End: 2021-06-14
Payer: MEDICARE

## 2021-06-14 PROBLEM — E44.1 MILD PROTEIN-CALORIE MALNUTRITION (HCC): Chronic | Status: ACTIVE | Noted: 2021-06-14

## 2021-06-14 PROBLEM — R62.7 FAILURE TO THRIVE IN ADULT: Status: ACTIVE | Noted: 2021-06-14

## 2021-06-14 LAB
ALBUMIN SERPL-MCNC: 3.5 G/DL (ref 3.5–5.2)
ALP BLD-CCNC: 54 U/L (ref 35–104)
ALT SERPL-CCNC: 6 U/L (ref 0–32)
ANION GAP SERPL CALCULATED.3IONS-SCNC: 10 MMOL/L (ref 7–16)
AST SERPL-CCNC: 21 U/L (ref 0–31)
BILIRUB SERPL-MCNC: 0.3 MG/DL (ref 0–1.2)
BUN BLDV-MCNC: 42 MG/DL (ref 6–23)
CALCIUM SERPL-MCNC: 9.3 MG/DL (ref 8.6–10.2)
CHLORIDE BLD-SCNC: 105 MMOL/L (ref 98–107)
CO2: 17 MMOL/L (ref 22–29)
CREAT SERPL-MCNC: 1.6 MG/DL (ref 0.5–1)
EKG ATRIAL RATE: 62 BPM
EKG P AXIS: 50 DEGREES
EKG P-R INTERVAL: 174 MS
EKG Q-T INTERVAL: 452 MS
EKG QRS DURATION: 124 MS
EKG QTC CALCULATION (BAZETT): 458 MS
EKG R AXIS: 4 DEGREES
EKG T AXIS: 28 DEGREES
EKG VENTRICULAR RATE: 62 BPM
GFR AFRICAN AMERICAN: 37
GFR NON-AFRICAN AMERICAN: 30 ML/MIN/1.73
GLUCOSE BLD-MCNC: 87 MG/DL (ref 74–99)
HBA1C MFR BLD: 7.9 % (ref 4–5.6)
LIPASE: 778 U/L (ref 13–60)
LV EF: 55 %
LVEF MODALITY: NORMAL
METER GLUCOSE: 110 MG/DL (ref 74–99)
METER GLUCOSE: 136 MG/DL (ref 74–99)
METER GLUCOSE: 50 MG/DL (ref 74–99)
METER GLUCOSE: 74 MG/DL (ref 74–99)
METER GLUCOSE: 78 MG/DL (ref 74–99)
METER GLUCOSE: 96 MG/DL (ref 74–99)
POTASSIUM SERPL-SCNC: 4.3 MMOL/L (ref 3.5–5)
SODIUM BLD-SCNC: 132 MMOL/L (ref 132–146)
TOTAL PROTEIN: 6.2 G/DL (ref 6.4–8.3)

## 2021-06-14 PROCEDURE — 83690 ASSAY OF LIPASE: CPT

## 2021-06-14 PROCEDURE — 99222 1ST HOSP IP/OBS MODERATE 55: CPT | Performed by: SURGERY

## 2021-06-14 PROCEDURE — 97165 OT EVAL LOW COMPLEX 30 MIN: CPT

## 2021-06-14 PROCEDURE — 76770 US EXAM ABDO BACK WALL COMP: CPT

## 2021-06-14 PROCEDURE — 36415 COLL VENOUS BLD VENIPUNCTURE: CPT

## 2021-06-14 PROCEDURE — 93010 ELECTROCARDIOGRAM REPORT: CPT | Performed by: INTERNAL MEDICINE

## 2021-06-14 PROCEDURE — 99223 1ST HOSP IP/OBS HIGH 75: CPT | Performed by: INTERNAL MEDICINE

## 2021-06-14 PROCEDURE — APPSS60 APP SPLIT SHARED TIME 46-60 MINUTES: Performed by: NURSE PRACTITIONER

## 2021-06-14 PROCEDURE — 93306 TTE W/DOPPLER COMPLETE: CPT

## 2021-06-14 PROCEDURE — 6360000002 HC RX W HCPCS: Performed by: INTERNAL MEDICINE

## 2021-06-14 PROCEDURE — 80053 COMPREHEN METABOLIC PANEL: CPT

## 2021-06-14 PROCEDURE — 71045 X-RAY EXAM CHEST 1 VIEW: CPT

## 2021-06-14 PROCEDURE — 82962 GLUCOSE BLOOD TEST: CPT

## 2021-06-14 PROCEDURE — 6370000000 HC RX 637 (ALT 250 FOR IP): Performed by: INTERNAL MEDICINE

## 2021-06-14 PROCEDURE — 2580000003 HC RX 258: Performed by: INTERNAL MEDICINE

## 2021-06-14 PROCEDURE — 1200000000 HC SEMI PRIVATE

## 2021-06-14 RX ORDER — SODIUM CHLORIDE 0.9 % (FLUSH) 0.9 %
10 SYRINGE (ML) INJECTION PRN
Status: DISCONTINUED | OUTPATIENT
Start: 2021-06-14 | End: 2021-06-16 | Stop reason: HOSPADM

## 2021-06-14 RX ORDER — SODIUM CHLORIDE 9 MG/ML
25 INJECTION, SOLUTION INTRAVENOUS PRN
Status: DISCONTINUED | OUTPATIENT
Start: 2021-06-14 | End: 2021-06-16 | Stop reason: HOSPADM

## 2021-06-14 RX ORDER — SODIUM CHLORIDE 0.9 % (FLUSH) 0.9 %
10 SYRINGE (ML) INJECTION EVERY 12 HOURS SCHEDULED
Status: DISCONTINUED | OUTPATIENT
Start: 2021-06-14 | End: 2021-06-16 | Stop reason: HOSPADM

## 2021-06-14 RX ORDER — ONDANSETRON 4 MG/1
4 TABLET, ORALLY DISINTEGRATING ORAL EVERY 8 HOURS PRN
Status: DISCONTINUED | OUTPATIENT
Start: 2021-06-14 | End: 2021-06-16 | Stop reason: HOSPADM

## 2021-06-14 RX ORDER — ACETAMINOPHEN 650 MG/1
650 SUPPOSITORY RECTAL EVERY 6 HOURS PRN
Status: DISCONTINUED | OUTPATIENT
Start: 2021-06-14 | End: 2021-06-16 | Stop reason: HOSPADM

## 2021-06-14 RX ORDER — POTASSIUM CHLORIDE 20 MEQ/1
40 TABLET, EXTENDED RELEASE ORAL PRN
Status: DISCONTINUED | OUTPATIENT
Start: 2021-06-14 | End: 2021-06-14 | Stop reason: ALTCHOICE

## 2021-06-14 RX ORDER — ONDANSETRON 2 MG/ML
4 INJECTION INTRAMUSCULAR; INTRAVENOUS EVERY 6 HOURS PRN
Status: DISCONTINUED | OUTPATIENT
Start: 2021-06-14 | End: 2021-06-16 | Stop reason: HOSPADM

## 2021-06-14 RX ORDER — ACETAMINOPHEN 325 MG/1
650 TABLET ORAL EVERY 6 HOURS PRN
Status: DISCONTINUED | OUTPATIENT
Start: 2021-06-14 | End: 2021-06-16 | Stop reason: HOSPADM

## 2021-06-14 RX ORDER — SENNA PLUS 8.6 MG/1
1 TABLET ORAL DAILY PRN
Status: DISCONTINUED | OUTPATIENT
Start: 2021-06-14 | End: 2021-06-16 | Stop reason: HOSPADM

## 2021-06-14 RX ORDER — SODIUM CHLORIDE 9 MG/ML
INJECTION, SOLUTION INTRAVENOUS CONTINUOUS
Status: DISCONTINUED | OUTPATIENT
Start: 2021-06-14 | End: 2021-06-16

## 2021-06-14 RX ORDER — POTASSIUM CHLORIDE 7.45 MG/ML
10 INJECTION INTRAVENOUS PRN
Status: DISCONTINUED | OUTPATIENT
Start: 2021-06-14 | End: 2021-06-14 | Stop reason: ALTCHOICE

## 2021-06-14 RX ADMIN — SODIUM CHLORIDE, PRESERVATIVE FREE 10 ML: 5 INJECTION INTRAVENOUS at 10:09

## 2021-06-14 RX ADMIN — POTASSIUM CHLORIDE 10 MEQ: 750 TABLET, EXTENDED RELEASE ORAL at 10:09

## 2021-06-14 RX ADMIN — ENOXAPARIN SODIUM 30 MG: 30 INJECTION SUBCUTANEOUS at 10:09

## 2021-06-14 RX ADMIN — DONEPEZIL HYDROCHLORIDE 10 MG: 5 TABLET, FILM COATED ORAL at 22:39

## 2021-06-14 RX ADMIN — ESCITALOPRAM OXALATE 10 MG: 10 TABLET ORAL at 10:09

## 2021-06-14 RX ADMIN — NEBIVOLOL HYDROCHLORIDE 7.5 MG: 5 TABLET ORAL at 10:09

## 2021-06-14 RX ADMIN — SODIUM CHLORIDE: 9 INJECTION, SOLUTION INTRAVENOUS at 10:10

## 2021-06-14 RX ADMIN — SODIUM CHLORIDE, PRESERVATIVE FREE 10 ML: 5 INJECTION INTRAVENOUS at 14:41

## 2021-06-14 RX ADMIN — PIOGLITAZONE 30 MG: 15 TABLET ORAL at 10:09

## 2021-06-14 RX ADMIN — POTASSIUM CHLORIDE 10 MEQ: 750 TABLET, EXTENDED RELEASE ORAL at 22:39

## 2021-06-14 ASSESSMENT — PAIN SCALES - GENERAL
PAINLEVEL_OUTOF10: 0
PAINLEVEL_OUTOF10: 0

## 2021-06-14 NOTE — ED PROVIDER NOTES
Department of Emergency Medicine   ED  Provider Note  Admit Date/RoomTime: 6/13/2021  5:54 PM  ED Room: 0519/0519-A          History of Present Illness:  6/13/21, Time: 8:22 PM EDT  Chief Complaint   Patient presents with    Hypotension      in triage    Hyperglycemia    Altered Mental Status                Miri Officer is a 80 y.o. female presenting to the ED for hypotension and hyperglycemia, beginning today. The complaint has been constant, moderate in severity, and worsened by nothing. Family went to visit the patient and they are in healthcare and checked the patient's blood pressure and found to be hypotensive and checked her sugar and found to be hyperglycemic. There are also concern for altered mental status potentially different from dementia for the patient. Patient has no complaints at this time. States \"I do not know why I am here. \"  She denies any chest pain abdominal pain nausea vomiting fever chills. Denies any weakness or paresthesias. Denies any headache. Denies any recent traumas or falls. Family states they do not think that she has fallen but she is a fall risk. Review of Systems:   Pertinent positives and negatives are stated within HPI, all other systems reviewed and are negative.        --------------------------------------------- PAST HISTORY ---------------------------------------------  Past Medical History:  has a past medical history of Arthritis, Asthma, CAD (coronary artery disease), Diabetes mellitus ([facility] [address] GERD (gastroesophageal reflux disease), Hyperlipidemia, Hypertension, ICD (implantable cardiac defibrillator) battery depletion, LBBB (left bundle branch block), and Nonischemic cardiomyopathy ([facility] [address] Past Surgical History:  has a past surgical history that includes Cataract removal with implant (2010); Retinal detachment surgery (1998); Carpal tunnel release; Diagnostic Cardiac Cath Lab Procedure (3/23/10);  Cardiac defibrillator placement (09/01/2011); and Cardiac defibrillator placement (Left, 05/09/2017). Social History:  reports that she has never smoked. She has never used smokeless tobacco. She reports that she does not drink alcohol and does not use drugs. Family History: family history includes Cancer in her father. . Unless otherwise noted, family history is non contributory    The patients home medications have been reviewed. Allergies: Codeine and Statins        ---------------------------------------------------PHYSICAL EXAM--------------------------------------    Constitutional/General: Alert and oriented x2  Head: Normocephalic and atraumatic. Dry mucosa. Eyes: PERRL, EOMI, sclera non icteric  Mouth: Oropharynx clear, handling secretions, no trismus, no asymmetry of the posterior oropharynx or uvular edema  Neck: Supple, full ROM, no stridor, no meningeal signs  Respiratory: Lungs clear to auscultation bilaterally,Not in respiratory distress  Cardiovascular:  Regular rate. Regular rhythm. 2+ distal pulses. Equal extremity pulses. Chest: No chest wall tenderness  GI:  Abdomen Soft, Non tender, Non distended. No rebound, guarding, or rigidity. Musculoskeletal: Moves all extremities x 4. Warm and well perfused, no clubbing, cyanosis, or edema. Capillary refill <3 seconds  Integument: skin warm and dry. No rashes. Neurologic: GCS 14, no focal deficits, symmetric strength 5/5 in the upper and lower extremities bilaterally  Psychiatric: Normal Affect      EKG: Interpreted by emergency department physician, Dr. Michael Leyva    This EKG is signed and interpreted by me. Rate: 62  Rhythm: paced  Interpretation: paced rhythm  Comparison: stable as compared to patient's most recent EKG      -------------------------------------------------- RESULTS -------------------------------------------------  I have personally reviewed all laboratory and imaging results for this patient. Results are listed below.      LABS: (Lab results interpreted by me)  Results for orders placed or performed during the hospital encounter of 06/13/21   Culture, Urine    Specimen: Urine, clean catch   Result Value Ref Range    Urine Culture, Routine <10,000 CFU/mL  Gram negative rods      Respiratory Panel, Molecular, with COVID-19 (Restricted: peds pts or suitable admitted adults)    Specimen: Nasopharyngeal   Result Value Ref Range    Adenovirus by PCR Not Detected Not Detected    Bordetella parapertussis by PCR Not Detected Not Detected    Bordetella pertussis by PCR Not Detected Not Detected    Chlamydophilia pneumoniae by PCR Not Detected Not Detected    Coronavirus 229E by PCR Not Detected Not Detected    Coronavirus HKU1 by PCR Not Detected Not Detected    Coronavirus NL63 by PCR Not Detected Not Detected    Coronavirus OC43 by PCR Not Detected Not Detected    SARS-CoV-2, PCR Not Detected Not Detected    Human Metapneumovirus by PCR Not Detected Not Detected    Human Rhinovirus/Enterovirus by PCR Not Detected Not Detected    Influenza A by PCR Not Detected Not Detected    Influenza B by PCR Not Detected Not Detected    Mycoplasma pneumoniae by PCR Not Detected Not Detected    Parainfluenza Virus 1 by PCR Not Detected Not Detected    Parainfluenza Virus 2 by PCR Not Detected Not Detected    Parainfluenza Virus 3 by PCR Not Detected Not Detected    Parainfluenza Virus 4 by PCR Not Detected Not Detected    Respiratory Syncytial Virus by PCR Not Detected Not Detected   CBC Auto Differential   Result Value Ref Range    WBC 5.6 4.5 - 11.5 E9/L    RBC 3.87 3.50 - 5.50 E12/L    Hemoglobin 12.3 11.5 - 15.5 g/dL    Hematocrit 36.2 34.0 - 48.0 %    MCV 93.5 80.0 - 99.9 fL    MCH 31.8 26.0 - 35.0 pg    MCHC 34.0 32.0 - 34.5 %    RDW 13.4 11.5 - 15.0 fL    Platelets 072 438 - 943 E9/L    MPV 10.0 7.0 - 12.0 fL    Neutrophils % 53.9 43.0 - 80.0 %    Immature Granulocytes % 0.2 0.0 - 5.0 %    Lymphocytes % 34.6 20.0 - 42.0 %    Monocytes % 9.8 2.0 - 12.0 %    Eosinophils % 1.1 0.0 - 6.0 %    Basophils % 0.4 0.0 - 2.0 %    Neutrophils Absolute 3.02 1.80 - 7.30 E9/L    Immature Granulocytes # 0.01 E9/L    Lymphocytes Absolute 1.94 1.50 - 4.00 E9/L    Monocytes Absolute 0.55 0.10 - 0.95 E9/L    Eosinophils Absolute 0.06 0.05 - 0.50 E9/L    Basophils Absolute 0.02 0.00 - 0.20 E9/L   Comprehensive Metabolic Panel w/ Reflex to MG   Result Value Ref Range    Sodium 127 (L) 132 - 146 mmol/L    Potassium reflex Magnesium 4.2 3.5 - 5.0 mmol/L    Chloride 95 (L) 98 - 107 mmol/L    CO2 20 (L) 22 - 29 mmol/L    Anion Gap 12 7 - 16 mmol/L    Glucose 159 (H) 74 - 99 mg/dL    BUN 46 (H) 6 - 23 mg/dL    CREATININE 1.9 (H) 0.5 - 1.0 mg/dL    GFR Non-African American 25 >=60 mL/min/1.73    GFR African American 30     Calcium 10.2 8.6 - 10.2 mg/dL    Total Protein 7.1 6.4 - 8.3 g/dL    Albumin 4.2 3.5 - 5.2 g/dL    Total Bilirubin 0.3 0.0 - 1.2 mg/dL    Alkaline Phosphatase 71 35 - 104 U/L    ALT 12 0 - 32 U/L    AST 21 0 - 31 U/L   Lipase   Result Value Ref Range    Lipase 552 (H) 13 - 60 U/L   Lactic Acid, Plasma   Result Value Ref Range    Lactic Acid 1.9 0.5 - 2.2 mmol/L   Troponin   Result Value Ref Range    Troponin, High Sensitivity 26 (H) 0 - 9 ng/L   Brain Natriuretic Peptide   Result Value Ref Range    Pro-BNP 1,336 (H) 0 - 450 pg/mL   Urinalysis, reflex to microscopic   Result Value Ref Range    Color, UA Yellow Straw/Yellow    Clarity, UA Clear Clear    Glucose, Ur Negative Negative mg/dL    Bilirubin Urine Negative Negative    Ketones, Urine Negative Negative mg/dL    Specific Gravity, UA 1.025 1.005 - 1.030    Blood, Urine TRACE-INTACT Negative    pH, UA 5.5 5.0 - 9.0    Protein, UA Negative Negative mg/dL    Urobilinogen, Urine 0.2 <2.0 E.U./dL    Nitrite, Urine Negative Negative    Leukocyte Esterase, Urine MODERATE (A) Negative   Microscopic Urinalysis   Result Value Ref Range    Hyaline Casts, UA 0-2 0 - 2 /LPF    WBC, UA 1-3 0 - 5 /HPF    RBC, UA 0-1 0 - 2 /HPF    Epithelial Cells, UA RARE /HPF    Bacteria, UA NONE SEEN None Seen /HPF   COMPREHENSIVE METABOLIC PANEL   Result Value Ref Range    Sodium 132 132 - 146 mmol/L    Potassium 4.3 3.5 - 5.0 mmol/L    Chloride 105 98 - 107 mmol/L    CO2 17 (L) 22 - 29 mmol/L    Anion Gap 10 7 - 16 mmol/L    Glucose 87 74 - 99 mg/dL    BUN 42 (H) 6 - 23 mg/dL    CREATININE 1.6 (H) 0.5 - 1.0 mg/dL    GFR Non-African American 30 >=60 mL/min/1.73    GFR African American 37     Calcium 9.3 8.6 - 10.2 mg/dL    Total Protein 6.2 (L) 6.4 - 8.3 g/dL    Albumin 3.5 3.5 - 5.2 g/dL    Total Bilirubin 0.3 0.0 - 1.2 mg/dL    Alkaline Phosphatase 54 35 - 104 U/L    ALT 6 0 - 32 U/L    AST 21 0 - 31 U/L   Hemoglobin A1c   Result Value Ref Range    Hemoglobin A1C 7.9 (H) 4.0 - 5.6 %   Lipase   Result Value Ref Range    Lipase 778 (H) 13 - 60 U/L   Comprehensive Metabolic Panel w/ Reflex to MG   Result Value Ref Range    Sodium 131 (L) 132 - 146 mmol/L    Potassium reflex Magnesium 4.3 3.5 - 5.0 mmol/L    Chloride 106 98 - 107 mmol/L    CO2 18 (L) 22 - 29 mmol/L    Anion Gap 7 7 - 16 mmol/L    Glucose 103 (H) 74 - 99 mg/dL    BUN 30 (H) 6 - 23 mg/dL    CREATININE 1.4 (H) 0.5 - 1.0 mg/dL    GFR Non-African American 35 >=60 mL/min/1.73    GFR African American 43     Calcium 9.4 8.6 - 10.2 mg/dL    Total Protein 6.0 (L) 6.4 - 8.3 g/dL    Albumin 3.5 3.5 - 5.2 g/dL    Total Bilirubin 0.3 0.0 - 1.2 mg/dL    Alkaline Phosphatase 51 35 - 104 U/L    ALT 9 0 - 32 U/L    AST 17 0 - 31 U/L   CBC auto differential   Result Value Ref Range    WBC 5.5 4.5 - 11.5 E9/L    RBC 3.42 (L) 3.50 - 5.50 E12/L    Hemoglobin 10.7 (L) 11.5 - 15.5 g/dL    Hematocrit 32.4 (L) 34.0 - 48.0 %    MCV 94.7 80.0 - 99.9 fL    MCH 31.3 26.0 - 35.0 pg    MCHC 33.0 32.0 - 34.5 %    RDW 13.4 11.5 - 15.0 fL    Platelets 204 891 - 791 E9/L    MPV 9.7 7.0 - 12.0 fL    Neutrophils % 42.0 (L) 43.0 - 80.0 %    Immature Granulocytes % 0.4 0.0 - 5.0 %    Lymphocytes % 46.7 (H) 20.0 - 42.0 %    Monocytes % 8.9 2.0 - Result Value Ref Range    Glucose 171 mg/dL    QC OK? yes    POCT Glucose   Result Value Ref Range    Meter Glucose 171 (H) 74 - 99 mg/dL   POCT Glucose   Result Value Ref Range    Meter Glucose 48 (L) 74 - 99 mg/dL   POCT Glucose   Result Value Ref Range    Meter Glucose 71 (L) 74 - 99 mg/dL   POCT Glucose   Result Value Ref Range    Meter Glucose 50 (L) 74 - 99 mg/dL   POCT Glucose   Result Value Ref Range    Meter Glucose 74 74 - 99 mg/dL   POCT Glucose   Result Value Ref Range    Meter Glucose 78 74 - 99 mg/dL   POCT Glucose   Result Value Ref Range    Meter Glucose 96 74 - 99 mg/dL   POCT Glucose   Result Value Ref Range    Meter Glucose 136 (H) 74 - 99 mg/dL   POCT Glucose   Result Value Ref Range    Meter Glucose 110 (H) 74 - 99 mg/dL   POCT Glucose   Result Value Ref Range    Meter Glucose 105 (H) 74 - 99 mg/dL   POCT Glucose   Result Value Ref Range    Meter Glucose 122 (H) 74 - 99 mg/dL   POCT Glucose   Result Value Ref Range    Meter Glucose 113 (H) 74 - 99 mg/dL   POCT Glucose   Result Value Ref Range    Meter Glucose 82 74 - 99 mg/dL   POCT Glucose   Result Value Ref Range    Meter Glucose 71 (L) 74 - 99 mg/dL   POCT Glucose   Result Value Ref Range    Meter Glucose 113 (H) 74 - 99 mg/dL   POCT Glucose   Result Value Ref Range    Meter Glucose 91 74 - 99 mg/dL   POCT Glucose   Result Value Ref Range    Meter Glucose 184 (H) 74 - 99 mg/dL   POCT Glucose   Result Value Ref Range    Meter Glucose 112 (H) 74 - 99 mg/dL   EKG 12 Lead   Result Value Ref Range    Ventricular Rate 62 BPM    Atrial Rate 62 BPM    P-R Interval 174 ms    QRS Duration 124 ms    Q-T Interval 452 ms    QTc Calculation (Bazett) 458 ms    P Axis 50 degrees    R Axis 4 degrees    T Axis 28 degrees   ,       RADIOLOGY:  Interpreted by Radiologist unless otherwise specified  US RETROPERITONEAL COMPLETE   Final Result   1.5 cm complex lesion mid zone left kidney. A renal mass cannot be excluded. Urological consultation recommended. 2.1 cm simple cyst inferior pole left kidney. XR CHEST PORTABLE   Final Result   No acute process. CT Head WO Contrast   Final Result   No acute intracranial abnormality. Periventricular white matter changes consistent chronic microvascular   disease. Diffuse volume loss. CT ABDOMEN PELVIS WO CONTRAST Additional Contrast? None   Final Result   There is a slightly hyperdense lesion arising from the inferior pole of the   left kidney measuring approximately 2.5 cm in diameter with attenuation in   the soft tissue range and indeterminate. A small solid renal mass such as   renal cell carcinoma cannot be excluded. Follow-up recommended. Consider   multiphasic CT of the kidneys or follow-up with ultrasound. Small   hemorrhagic cysts also noted in the left kidney. Colonic diverticulosis with no evidence of diverticulitis. Coarse calcification in the uterus likely related to fibroid.                          ------------------------- NURSING NOTES AND VITALS REVIEWED ---------------------------   The nursing notes within the ED encounter and vital signs as below have been reviewed by myself  BP (!) 125/54   Pulse 63   Temp 98 °F (36.7 °C) (Oral)   Resp 16   Ht 5' (1.524 m)   Wt 154 lb (69.9 kg)   SpO2 98%   BMI 30.08 kg/m²     Oxygen Saturation Interpretation: Normal      The patients available past medical records and past encounters were reviewed. ------------------------------ ED COURSE/MEDICAL DECISION MAKING----------------------  Medications   0.9 % sodium chloride bolus (0 mLs Intravenous Stopped 6/13/21 2042)   enoxaparin (LOVENOX) injection 30 mg (30 mg Subcutaneous Given 6/13/21 2233)                    Medical Decision Making:     I, Dr. Mukund Cohen am the primary provider of record    Family is mainly concerned that the patient cannot continue to live by herself and thinks that she requires placement. I agree with this assessment.   The patient has had dehydration enough to cause acute kidney injury. With her history for heart failure and elevated BNP at 1300 we do not want to do any further bolus of IV hydration in the emergency department. I do believe she would still benefit from admission for gentle hydration and continued evaluation and management working towards eventual placement for this patient. Discussed patient care with admitting physician. Patient has remained asymptomatic while in the emergency department. She has no complaints but is agreeable with staying to ensure her safety with the kidney function and heart function. She is less excited for placement however the family states that she is altered and cannot make this decision for herself which I agree she does have some evidence for altered mentation but I cannot tell if this is more dementia as opposed to delirium which I am less suspicious for at this time. Re-Evaluations:       Exam unchanged on reevaluation. This patient's ED course included: a personal history and physicial examination, re-evaluation prior to disposition, multiple bedside re-evaluations, IV medications and complex medical decision making and emergency management    This patient has remained hemodynamically stable during their ED course. Counseling: The emergency provider has spoken with the patient and discussed todays results, in addition to providing specific details for the plan of care and counseling regarding the diagnosis and prognosis. Questions are answered at this time and they are agreeable with the plan.       --------------------------------- IMPRESSION AND DISPOSITION ---------------------------------    IMPRESSION  1. PHOEBE (acute kidney injury) ([facility] [address]    2. Dehydration    3. Altered mental status, unspecified altered mental status type    4.  Congestive heart failure, unspecified HF chronicity, unspecified [facility]        DISPOSITION  Disposition: Admit to med/surg floor  Patient condition is good        NOTE: This report was transcribed using voice recognition software.  Every effort was made to ensure accuracy; however, inadvertent computerized transcription errors may be present        John Marie MD  06/26/21 0645

## 2021-06-14 NOTE — PROGRESS NOTES
Comprehensive Nutrition Assessment    Type and Reason for Visit:  Initial, Positive Nutrition Screen    Nutrition Recommendations/Plan: Once diet progresses add ONS to help meet nutritional needs. Nutrition Assessment:  Significant PMHx of arthritis, asthma, CAD, dementia, diabetes, GERD, hyperlipidemia, hypertension, ICD,LBBB and nonischemic cardiomyopathy. Pt presented to ED on 6/13 secondary to hypertension and hyperglycemia. Per EMF family reported that Ms. Shaneka Samson lost her  in 3/2021 and has steadily declined. Noted wt loss of 14#s over 9months. Malnutrition Assessment:  Malnutrition Status:  Mild malnutrition    Context:  Chronic Illness     Findings of the 6 clinical characteristics of malnutrition:  Energy Intake:  Mild decrease in energy intake (Comment) (Prior to adm poor po and appetite ~2wks)  Weight Loss:  1 - Mild weight loss (specify amount and time period) (10% over 9months)     Body Fat Loss:  1 - Mild body fat loss Orbital, Triceps   Muscle Mass Loss:  1 - Mild muscle mass loss Temples (temporalis), Clavicles (pectoralis & deltoids), Hand (interosseous)  Fluid Accumulation:  No significant fluid accumulation     Strength:  Not Performed    Estimated Daily Nutrient Needs:  Energy (kcal):   (MSJx1. 2SF); Weight Used for Energy Requirements:  Current     Protein (g):  60-70g (1.3-1.5g/kg IBW);  Weight Used for Protein Requirements:  Ideal        Fluid (ml/day):  ; Method Used for Fluid Requirements:         Nutrition Related Findings:  AMS, Loss of appetite, BS active, Edema None, No I/Os, Abd WDL      Wounds:  None       Current Nutrition Therapies:    Diet NPO    Anthropometric Measures:  · Height: 5' (152.4 cm)  · Current Body Weight: 145 lb (65.8 kg) (6/14)   · [facility] Body [facility] lb 9.6 oz (66 kg[facility]    · Usual Body Weight: 159 lb 3.2 oz (72.2 kg) (9/16/20)     · Ideal Body Weight: 100 lbs; % Ideal Body Weight 145 %   · BMI: 28.3    · BMI

## 2021-06-14 NOTE — CONSULTS
GENERAL SURGERY  CONSULT NOTE  6/14/2021    Physician Consulted: Dr. Carli Monterroso  Reason for Consult: Elevated lipase  Referring Physician: Dr. Genie York is a 80 y.o. female past medical history of coronary disease and is nonischemic cardiomyopathy status post ICD placement. Patient was admitted from outside facility for hypotension and altered mental status. Patient currently mentating well. General surgery consulted for elevated lipase levels. Currently denying any abdominal pain. Moving her bowels normally. No nausea no vomiting. Fully coherent. CT abdomen pelvis does show evidence of chronic pancreatitis with calcifications in the tail of generalized atrophy. No signs of inflammation. No signs of any pancreatic or common bile duct dilation. Patient does have diabetes. Denies any symptoms of  steatorrhea No gallstones on CT. Past Medical History:   Diagnosis Date    Arthritis     Asthma     CAD (coronary artery disease)     Diabetes mellitus ([address]     GERD (gastroesophageal reflux disease)     Hyperlipidemia     Hypertension     ICD (implantable cardiac defibrillator) battery depletion     Foreman (biventricular)    LBBB (left bundle branch block)     Nonischemic cardiomyopathy ([address]        Past Surgical History:   Procedure Laterality Date    CARDIAC DEFIBRILLATOR PLACEMENT  09/01/2011    CARDIAC DEFIBRILLATOR PLACEMENT Left 05/09/2017    Bi-V gen change, Medtronic, WRAP-IT study,     CARPAL TUNNEL RELEASE      right arm    CATARACT REMOVAL WITH IMPLANT  2010    DIAGNOSTIC CARDIAC CATH LAB PROCEDURE  3/23/10    Dr. Marimar Cortés    left eye       Medications Prior to Admission:    Prior to Admission medications    Medication Sig Start Date End Date Taking?  Authorizing Provider   escitalopram (LEXAPRO) 10 MG tablet Take 1 tablet by mouth daily 6/1/21  Yes Annika English MD   donepezil (ARICEPT) 10 MG tablet Take 1 tablet by mouth nightly 5/3/21  Yes Torie Wong MD   albuterol sulfate HFA (VENTOLIN HFA) 108 (90 Base) MCG/ACT inhaler Inhale 2 puffs into the lungs 4 times daily as needed for Wheezing 4/27/21  Yes Torie Wong MD   pioglitazone (ACTOS) 30 MG tablet Take 1 tablet by mouth daily 3/27/21  Yes Torie Wong MD   losartan (COZAAR) 50 MG tablet Take 1 tablet by mouth daily 3/26/21  Yes Torie Wong MD   nebivolol (BYSTOLIC) 2.5 MG tablet Take 3 tablets by mouth daily 3/26/21  Yes Torie Wong MD   glipiZIDE (GLUCOTROL) 10 MG tablet Take 1 tablet by mouth 2 times daily (before meals) 3/26/21  Yes Torie Wong MD   hydroCHLOROthiazide (MICROZIDE) 12.5 MG capsule Take 1 capsule by mouth daily 3/26/21  Yes Torie Wong MD   metFORMIN (GLUCOPHAGE) 500 MG tablet Take 1 tablet by mouth 2 times daily (with meals) 9/18/20  Yes Torie Wong MD   potassium chloride (KLOR-CON M) 10 MEQ extended release tablet Take 1 tablet by mouth 3 times daily 3 tablets by mouth every day 6/5/20  Yes Torie Wong MD   Blood Glucose Monitoring Suppl (Samir Finley) w/Device KIT To test blood sugar 5/14/21   Torie Wong MD   blood glucose test strips (ASCENSIA AUTODISC VI;ONE TOUCH ULTRA TEST VI) strip Test BID 5/14/21   Torie Wong MD   ibuprofen (ADVIL;MOTRIN) 800 MG tablet  3/26/21   Historical Provider, MD Samir Finley strip 1 each by Other route daily 7/23/19   Torie Wong MD   Handicap Placard MISC by Does not apply route    Historical Provider, MD       Allergies   Allergen Reactions    Codeine     Statins        Family History   Problem Relation Age of Onset    Cancer Father        Social History     Tobacco Use    Smoking status: Never Smoker    Smokeless tobacco: Never Used   Vaping Use    Vaping Use: Never used   Substance Use Topics    Alcohol use: No    Drug use: No         Review of Systems   General ROS: negative  Hematological and Lymphatic ROS: negative  Respiratory ROS: no cough, shortness of breath, or wheezing  Cardiovascular ROS: no chest pain or dyspnea on exertion  Gastrointestinal ROS: no abdominal pain, change in bowel habits, or black or bloody stools  Genito-Urinary ROS: no dysuria, trouble voiding, or hematuria  Musculoskeletal ROS: negative      PHYSICAL EXAM:    Vitals:    06/14/21 0930   BP: (!) 105/55   Pulse: 69   Resp: 16   Temp: 98.2 °F (36.8 °C)   SpO2: 100%       General Appearance:  awake, alert, oriented, in no acute distress  Skin:  Skin color, texture, turgor normal. No rashes or lesions. Head/face:  NCAT  Eyes:  No gross abnormalities. and Sclera nonicteric  Lungs:  Breathing Pattern: regular, no distress  Heart:  Heart regular rate and rhythm  Abdomen:  Soft, non-tender, normal bowel sounds. No bruits, organomegaly or masses. Extremities: Extremities warm to touch, pink, with no edema. LABS:    CBC  Recent Labs     06/13/21  1844   WBC 5.6   HGB 12.3   HCT 36.2        BMP  Recent Labs     06/14/21  0330      K 4.3      CO2 17*   BUN 42*   CREATININE 1.6*   CALCIUM 9.3     Liver Function  Recent Labs     06/14/21  0330   LIPASE 778*   BILITOT 0.3   AST 21   ALT 6   ALKPHOS 54   PROT 6.2*   LABALBU 3.5     No results for input(s): LACTATE in the last 72 hours. No results for input(s): INR, PTT in the last 72 hours.     Invalid input(s): PT    RADIOLOGY    Echo Complete    Result Date: 6/14/2021  Transthoracic Echocardiography Report (TTE)  Demographics   Patient Name        Benedicto Elizondo  Gender               Female                      L   Medical Record      [id number]         Room Number          9449  Number   Account #           [de-identified]        Procedure Date       06/14/2021   Corporate ID                         Ordering Physician   Alicja Castellon MD   Accession Number    [id number]       Referring Physician  Teodora Salazar   Date of Birth       09/21/1932       Sonographer          Arlyn DORANTES   Age                 80 year(s)       Interpreting         Joshua Diaz physician) on 06/14/2021 11:36 AM  ----------------------------------------------------------------  M-Mode/2D Measurements & Calculations   LV Diastolic    LV Systolic Dimension: 3 cm AV Cusp Separation: 1.5 cmLA  Dimension: 4.1  LV Volume Diastolic: 88.4   Dimension: 3.9 cmAO Root  cm              ml                          Dimension: 2.7 cm  LV FS:26.8 %    LV Volume Systolic: 34 ml  LV PW           LV EDV/LV EDV Index: 26.0  Diastolic: 1.1  DN/40 HW/C^8JK ESV/LV ESV  cm              Index: 34 ml/21ml/ m^2      RV Diastolic Dimension: 3.9 cm  LV PW Systolic: EF Calculated: 43.7 %  1.3 cm          LV Mass Index: 93 l/min*m^2 LA/Aorta: 1.44  Septum          LV Length: 5.8 cm           Ascending Aorta: 3.2 cm  Diastolic: 1.1                              LA volume/Index: 44 ml  cm              LVOT: 2 cm                  /27ml/m^2  Septum                                      RA Area: 57.8 cm^2  Systolic: 1.3  cm                                          IVC Expiration: 1.1 cm  CO: 3.66 l/min  CI: 2.25  l/m*m^2  LV Mass: 151.3  g  Doppler Measurements & Calculations   MV Peak E-Wave:   AV Peak Velocity: 1.45 m/s     LVOT Peak Velocity: 0.93  0.61 m/s          AV Peak Gradient: 8.35 mmHg    m/s  MV Peak A-Wave:   AV Mean Velocity: 0.98 m/s     LVOT Mean Velocity: 0.7  0.72 m/s          AV Mean Gradient: 4.5 mmHg     m/s  MV E/A Ratio:     AV VTI: 24.4 cm                LVOT Peak Gradient: 3.4  0.84              AV Area (Continuity):2.46 cm^2 mmHgLVOT Mean Gradient:  MV Peak Gradient:                                2.1 mmHg  2.4 mmHg          LVOT VTI: 19.1 cm              Estimated RVSP: 27.1 mmHg  MV Mean Gradient: IVRT: 115.3 msec               Estimated RAP:3 mmHg  1.1 mmHg          Estimated PASP: 27.09 mmHg  MV Mean Velocity: Pulm. Vein A Reversal  0.49 m/s          Duration:87.7 msec             TR Velocity:2.45 m/s  MV Deceleration   Pulm.  Vein D Velocity:0.23     TR Gradient:24.09 mmHg  Time: 246 msec

## 2021-06-14 NOTE — DISCHARGE INSTR - COC
Continuity of Care Form    Patient Name: Klaudia Keane   :  1932  MRN:  [id number]    Admit date:  2021  Discharge date:      Code Status Order: DNR-CCA   Advance Directives:   [facility] Documentation       Date/Time Healthcare Directive Type of Healthcare Directive Copy in [address] Agent's Name Healthcare Agent's Phone Number    [phone]  Yes, patient has an advance directive for healthcare treatment  Durable power of  for health care  --  -- niece  --  --            Admitting Physician:  Norman Hammans, DO  PCP: Alec Braga MD    Discharging Nurse: [facility] Unit/Room#: 5589/2894-S  Discharging Unit Phone Number: [phone]    Emergency Contact:   Extended Emergency Contact Information  [facility] Phone: [phone]  Relation: Other  Secondary Emergency Contact: [address] Phone: [phone]  Relation: Brother/Sister    Past Surgical History:  Past Surgical History:   Procedure Laterality Date    CARDIAC DEFIBRILLATOR PLACEMENT  2011    CARDIAC DEFIBRILLATOR PLACEMENT Left 2017    Bi-V gen change, [employer], WRAP-IT study,     CARPAL TUNNEL RELEASE      right arm    CATARACT REMOVAL WITH IMPLANT      DIAGNOSTIC CARDIAC CATH LAB PROCEDURE  3/23/10    Dr. Zeina Bustillos    left eye       Immunization History:   Immunization History   Administered Date(s) Administered    COVID-19, Chase Bales, PF, 30mcg/0.3mL 2021, 2021    Influenza Vaccine, unspecified formulation 10/30/2009, 2012, 2014, 2015, 10/18/2016, 2017    Influenza Virus Vaccine 2012, 11/10/2014, 2015, 10/18/2016, 2017, 2019    Influenza, High Dose (Fluzone 65 yrs and older) 2018    Influenza, Quadv, IM, PF (6 mo and older Fluzone, Flulaval, Fluarix, and 3 yrs and older Afluria) 02/27/2019    Influenza, Quadv, adjuvanted, 65 yrs +, IM, PF (Fluad) 10/28/2020    Influenza, Triv, inactivated, subunit, adjuvanted, IM (Fluad 65 yrs and older) 11/05/2019    Pneumococcal Polysaccharide ([id number]) 02/27/2019       Active Problems:  Patient Active Problem List   Diagnosis Code    Cardiomyopathy, nonischemic (HCC) I42.8    Bundle branch block, left I44.7    Asthma J45.909    Biventricular implantable cardioverter-defibrillator in situ Z95.810    CAD (coronary artery disease) I25.10    Diabetes 1.5, 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    Patient in clinical research study Z00.6    Insomnia due to medical condition G47.01    Late onset Alzheimer's disease with behavioral disturbance (HCC) G30.1, F02.81    PHOEBE (acute kidney injury) ([facility] N17.9    Failure to thrive in adult R62.7    Mild protein-calorie malnutrition ([facility] E44.1       Isolation/Infection:   Isolation            No Isolation          Patient Infection Status       Infection Onset Added Last Indicated Last Indicated By Review Planned Expiration Resolved Resolved By    None active    Resolved    COVID-19 Rule Out 05/29/20 05/29/20 05/29/20 COVID-19 Ambulatory (Ordered)   06/02/20 Rule-Out Test Resulted            Nurse Assessment:  Last Vital Signs: BP (!) 105/55   Pulse 69   Temp 98.2 °F (36.8 °C) (Oral)   Resp 16   Ht 5' (1.524 m)   Wt 145 lb (65.8 kg)   SpO2 100%   BMI 28.32 kg/m²     Last documented pain score (0-10 scale): Pain Level: 0  Last Weight:   Wt Readings from Last 1 Encounters:   06/14/21 145 lb (65.8 kg)     Mental Status:  disoriented    IV Access:  - None    Nursing Mobility/ADLs:  Walking   Assisted  Transfer  Assisted  Bathing  Assisted  Dressing  Assisted  Toileting  Assisted  Feeding  Independent  Med Admin  Independent  Med Delivery   whole    Wound Care Documentation and Therapy:        Elimination:  Continence:    Bowel: Yes  Bladder: Yes  Urinary Catheter: None   Colostomy/Ileostomy/Ileal Conduit: No       Date of Last BM: 6/16    Intake/Output Summary (Last 24 hours) at 6/14/2021 1531  Last data filed at 6/14/2021 0830  Gross per 24 hour   Intake 0 ml   Output --   Net 0 ml     No intake/output data recorded. Safety Concerns: At Risk for Falls    Impairments/Disabilities:      None    Nutrition Therapy:  Current Nutrition Therapy:   - Oral Diet:  Low Fat    Routes of Feeding: Oral  Liquids: No Restrictions  Daily Fluid Restriction: no  Last Modified Barium Swallow with Video (Video Swallowing Test): not done    Treatments at the Time of Hospital Discharge:   Respiratory Treatments: ***  Oxygen Therapy:  is not on home oxygen therapy. Ventilator:    - No ventilator support    Rehab Therapies: Physical Therapy, Occupational Therapy and Speech/Language Therapy  Weight Bearing Status/Restrictions: No weight bearing restirctions  Other Medical Equipment (for information only, NOT a DME order):  {EQUIPMENT:[id number]}  Other Treatments: ***    Patient's personal belongings (please select all that are sent with patient):  None    RN SIGNATURE:  Electronically signed by Williams Morrow RN on 6/16/21 at 4:21 PM EDT    CASE MANAGEMENT/SOCIAL WORK SECTION    Inpatient Status Date: ***    Readmission Risk Assessment Score:  Readmission Risk              Risk of Unplanned Readmission:  14           Discharging to Facility/ Agency   Name: [address] [place] [address]  LNSEN:[phone]  Fax:[phone]    Dialysis Facility (if applicable)   Name:  Address:  Dialysis Schedule:  Phone:  Fax:    / signature: {Esignature:[id number]} Electronically signed by EVANS Manjarrez on 6/14/21 at 3:33 PM EDT      PHYSICIAN SECTION    Prognosis: Fair    Condition at Discharge: Stable    Rehab Potential (if transferring to Rehab):  Fair    Recommended Labs or Other Treatments After Discharge: Maintain adequate hydration    Physician Certification: I certify the above

## 2021-06-14 NOTE — CONSULTS
6/14/2021 2:19 PM  Service: Urology  Group: [facility] (Jonathan/Louisa/Steffi)    Jerome Read  [id number]     Chief Complaint:    Left Renal Mass     History of Present Illness: The patient is a 80 y.o. female patient who presented to the hospital yesterday evening with altered mental status, hyperglycemia, and hypotension. She was admitted for failure to thrive, hypokalemia, CHF, and PHOEBE. A CT abdomen pelvis performed showed a 2.5cm left renal lesion in the inferior pole and we were asked to further evaluate. She is confused but denies pain when asked. There is no family present. Review of the electronic health record unremarkable for any obvious  history.         Past Medical History:   Diagnosis Date    Arthritis     Asthma     CAD (coronary artery disease)     Diabetes mellitus ([address]     GERD (gastroesophageal reflux disease)     Hyperlipidemia     Hypertension     ICD (implantable cardiac defibrillator) battery depletion     Melvinia Frizzle (biventricular)    LBBB (left bundle branch block)     Nonischemic cardiomyopathy ([address]          Past Surgical History:   Procedure Laterality Date    CARDIAC DEFIBRILLATOR PLACEMENT  09/01/2011    CARDIAC DEFIBRILLATOR PLACEMENT Left 05/09/2017    Bi-V gen change, [employer], WRAP-IT study,     CARPAL TUNNEL RELEASE      right arm    CATARACT REMOVAL WITH IMPLANT  2010    DIAGNOSTIC CARDIAC CATH LAB PROCEDURE  3/23/10    Dr. Leonora Leyva    left eye       Medications Prior to Admission:    Medications Prior to Admission: escitalopram (LEXAPRO) 10 MG tablet, Take 1 tablet by mouth daily  donepezil (ARICEPT) 10 MG tablet, Take 1 tablet by mouth nightly  albuterol sulfate HFA (VENTOLIN HFA) 108 (90 Base) MCG/ACT inhaler, Inhale 2 puffs into the lungs 4 times daily as needed for Wheezing  pioglitazone (ACTOS) 30 MG tablet, Take 1 tablet by mouth daily  losartan (COZAAR) 50 MG tablet, Take 1 tablet by mouth daily  nebivolol (BYSTOLIC) 2.5 MG tablet, Take 3 tablets by mouth daily  glipiZIDE (GLUCOTROL) 10 MG tablet, Take 1 tablet by mouth 2 times daily (before meals)  hydroCHLOROthiazide (MICROZIDE) 12.5 MG capsule, Take 1 capsule by mouth daily  metFORMIN (GLUCOPHAGE) 500 MG tablet, Take 1 tablet by mouth 2 times daily (with meals)  potassium chloride (KLOR-CON M) 10 MEQ extended release tablet, Take 1 tablet by mouth 3 times daily 3 tablets by mouth every day  Blood Glucose Monitoring Suppl (ONETOUCH VERIO) w/Device KIT, To test blood sugar  blood glucose test strips (ASCENSIA AUTODISC VI;ONE TOUCH ULTRA TEST VI) strip, Test BID  ibuprofen (ADVIL;MOTRIN) 800 MG tablet,   ONETOUCH VERIO strip, 1 each by Other route daily  Handicap Placard MISC, by Does not apply route    Allergies:    Codeine and Statins    Social History:    reports that she has never smoked. She has never used smokeless tobacco. She reports that she does not drink alcohol and does not use drugs. Family History:   Non-contributory to this Urological problem  family history includes Cancer in her father. Review of Systems:  Unable to obtain due to confusion     Physical Exam:     Vitals:  BP (!) 105/55   Pulse 69   Temp 98.2 °F (36.8 °C) (Oral)   Resp 16   Ht 5' (1.524 m)   Wt 145 lb (65.8 kg)   SpO2 100%   BMI 28.32 kg/m²     General: awake and alert, confused, no acute distress   HEENT:  Normocephalic, atraumatic. Lungs:  Respirations symmetric and non-labored. Abdomen:  soft, nontender, no masses  Extremities:  No clubbing, cyanosis, or edema  Skin:  Warm and dry, no open lesions or rashes  Neuro:  There are no motor or sensory deficits in the 4 quadrant extremities   Rectal: deferred  Genitourinary:  No jordan     Labs:     Recent Labs     06/13/21  1844   WBC 5.6   RBC 3.87   HGB 12.3   HCT 36.2   MCV 93.5   MCH 31.8   MCHC 34.0   RDW 13.4      MPV 10.0         Recent Labs     06/13/21  1844 06/14/21  0330   CREATININE 1.9* 1.6*       Imaging: Narrative   EXAMINATION:   CT OF THE ABDOMEN AND PELVIS WITHOUT CONTRAST 6/13/2021 5:32 pm       TECHNIQUE:   CT of the abdomen and pelvis was performed without the administration of   intravenous contrast. Multiplanar reformatted images are provided for review.    Dose modulation, iterative reconstruction, and/or weight based adjustment of   the mA/kV was utilized to reduce the radiation dose to as low as reasonably   achievable.       COMPARISON:   None.       HISTORY:   ORDERING SYSTEM PROVIDED HISTORY: pain, diarrhea   TECHNOLOGIST PROVIDED HISTORY:   Reason for exam:->pain, diarrhea   Additional Contrast?->None   Decision Support Exception - unselect if not a suspected or confirmed   emergency medical condition->Emergency Medical Condition (MA)       FINDINGS:   Lower Chest: The lung bases are clear.       Organs: Liver within normal limits with atherosclerotic calcifications.       The spleen, adrenals, pancreas and gallbladder are within normal limits.       Small exophytic lesion in the mid polar region of the left kidney measuring   approximately 1.5 cm in diameter, most likely hemorrhagic cyst.  Smaller   similar lesion in the inferior pole of the left kidney.  Smaller renal   cortical cysts also noted.       Rounded slightly hyper dense lesion arising from the inferior pole of the   left kidney measuring approximately 2.5 cm in diameter, indeterminate and   with attenuation in the soft tissue range.  Small renal solid mass cannot be   excluded.  Follow-up recommended.  Consider multiphasic CT of the kidneys or   follow-up with ultrasound.       No obstructive uropathy.       GI/Bowel: No evidence of bowel obstruction or free intraperitoneal air.       Scattered colonic diverticula with no evidence of diverticulitis.       Normal appendix.       Pelvis:       Coarse uterine calcification likely related to fibroid measuring   approximately 1.7 cm in diameter.       Urinary bladder within normal limits.     No free fluid in the pelvis.       Peritoneum/Retroperitoneum: Atherosclerotic calcification along the abdominal   aorta with no evidence of aneurysmal dilatation.       Bones/Soft Tissues: No acute bony pathology.           Impression   There is a slightly hyperdense lesion arising from the inferior pole of the   left kidney measuring approximately 2.5 cm in diameter with attenuation in   the soft tissue range and indeterminate.  A small solid renal mass such as   renal cell carcinoma cannot be excluded.  Follow-up recommended.  Consider   multiphasic CT of the kidneys or follow-up with ultrasound.  Small   hemorrhagic cysts also noted in the left kidney.       Colonic diverticulosis with no evidence of diverticulitis.       Coarse calcification in the uterus likely related to fibroid.                 Assessment/plan:  2.5cm inferior pole left renal lesion  Azotemia     Cont to watch the creatinine   Urine culture pending   Antibiotics per primary   Urine cytology ordered  CTAP reviewed, there is an indeterminate lesion in the inferior pole of the left kidney measuring approximately 2.5cm. No prior imaging to compare too. Renal ultrasound ordered to further evaluate  Will follow     Electronically signed by LUCÍA Drake CNP on 6/14/2021 at 2:19 PM     I agree with the assessment and plan of JASON Drake. I personally evaluated the patient and made any changes to reflect my impression and plan. 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  Recommend no treatment especially in light of advanced age and poor health. Follow up with Enhanced Renal Ultrasound in 6 months.

## 2021-06-14 NOTE — H&P
Internal Medicine History & Physical     Name: Author Giovanny  : 1932  Chief Complaint: Hypotension ( in triage), Hyperglycemia, and Altered Mental Status  Primary Care Physician: Norma Brennan MD  Admission date: 2021  Date of service: 2021     History of Present Illness  [place] is a 80y.o. year old female with a significant past medical history of arthritis, asthma, CAD, diabetes, GERD, hyperlipidemia, hypertension, ICD,LBBB and nonischemic cardiomyopathy. [place] presented to the emergency room on  secondary to hypertension and hyperglycemia. Upon asking the patient why she presented to ER she said I do not know nothing is wrong with me. She also informed me she lived with her  who has been  since March. Therefore, I called her POA, Mary Sawyer([phone]), to obtain the HPI. Her POA reports that in March she lost her  of 76 years. States he  in the bed next to her. She states since the death of her  the patient has had a gradual decline, more noticeable in the last 2 weeks. At first they thought maybe she was depressed and she was placed on some Lexapro. However she has not been eating well and in the last 4 to 5 days has been fighting the family to even take her medications. The POA states on Saturday she would not eat at all and on  she refused any food and her medications. POA states her blood sugar was low and on Friday it was less than 40. She got the patient to drink some juice and she rebounded. However yesterday again her blood sugars were low and she was hypotensive along with a change in her mental status and therefore she was brought to the emergency room. The POA does state the patient has complained of nausea as well as her stomach hurting. There was no emesis appreciated. BP in ER 97/50. Work up in [place] [id number] demonstrated Lipase 552, Pro BNP 1336 Na 127 BUN 46 creatinine 1.9 urinalysis with moderate leukocyte esterase,  negative nitrites and SG 1.025. CT of the abdomen/pelvis without contrast demonstrated a hyperdense lesion to the left kidney--small solid renal mass such as renal cell carcinoma could not be excluded. CT of the head was obtained with no acute changes. Treatment in ER consisted of IV fluid bolus. Currently she is assessed while lying in bed. She states she is not sure why she is here because she feels fine. She denies complaints of shortness of breath, chest pain, abdominal pain, nausea or vomiting. There are no family or friends at bedside. The history is provided by her POA, Herson Venegas secondary to her altered mental status. ED course:   Initial blood work and imaging studies performed. Admission recommended by ED physician. Meds in ED consisted of the following:     Past Medical History:   Diagnosis Date    Arthritis     Asthma     CAD (coronary artery disease)     Diabetes mellitus ([address]     GERD (gastroesophageal reflux disease)     Hyperlipidemia     Hypertension     ICD (implantable cardiac defibrillator) battery depletion     [place] (biventricular)    LBBB (left bundle branch block)     Nonischemic cardiomyopathy ([address]        Past Surgical History:   Procedure Laterality Date    CARDIAC DEFIBRILLATOR PLACEMENT  09/01/2011    CARDIAC DEFIBRILLATOR PLACEMENT Left 05/09/2017    Bi-V gen change, Medtronic, WRAP-IT study,     CARPAL TUNNEL RELEASE      right arm    CATARACT REMOVAL WITH IMPLANT  2010    DIAGNOSTIC CARDIAC CATH LAB PROCEDURE  3/23/10    Dr. Jamie South    left eye       Family History   Problem Relation Age of Onset    Cancer Father        No pertinent family medical history with regard to current issues. Social History  Patient lives at home alone. Employment: none  Illicit drug use- denies  TOBACCO:   reports that she has never smoked.  She has never used smokeless tobacco.  ETOH:   reports no history of alcohol use. Home Medications  Prior to Admission medications    Medication Sig Start Date End Date Taking? Authorizing Provider   escitalopram (LEXAPRO) 10 MG tablet Take 1 tablet by mouth daily 6/1/21  Yes Verner Notice, MD   donepezil (ARICEPT) 10 MG tablet Take 1 tablet by mouth nightly 5/3/21  Yes Verner Notice, MD   albuterol sulfate HFA (VENTOLIN HFA) 108 (90 Base) MCG/ACT inhaler Inhale 2 puffs into the lungs 4 times daily as needed for Wheezing 4/27/21  Yes Verner Notice, MD   pioglitazone (ACTOS) 30 MG tablet Take 1 tablet by mouth daily 3/27/21  Yes Verner Notice, MD   losartan (COZAAR) 50 MG tablet Take 1 tablet by mouth daily 3/26/21  Yes Verner Notice, MD   nebivolol (BYSTOLIC) 2.5 MG tablet Take 3 tablets by mouth daily 3/26/21  Yes Verner Notice, MD   glipiZIDE (GLUCOTROL) 10 MG tablet Take 1 tablet by mouth 2 times daily (before meals) 3/26/21  Yes Verner Notice, MD   hydroCHLOROthiazide (MICROZIDE) 12.5 MG capsule Take 1 capsule by mouth daily 3/26/21  Yes Verner Notice, MD   metFORMIN (GLUCOPHAGE) 500 MG tablet Take 1 tablet by mouth 2 times daily (with meals) 9/18/20  Yes Verner Notice, MD   potassium chloride (KLOR-CON M) 10 MEQ extended release tablet Take 1 tablet by mouth 3 times daily 3 tablets by mouth every day 6/5/20  Yes Verner Notice, MD   Blood Glucose Monitoring Suppl (Destiny Coles) w/Device KIT To test blood sugar 5/14/21   Verner Notice, MD   blood glucose test strips (ASCENSIA AUTODISC VI;ONE TOUCH ULTRA TEST VI) strip Test BID 5/14/21   Verner Notice, MD   ibuprofen (ADVIL;MOTRIN) 800 MG tablet  3/26/21   Historical Provider, MD Destiny Coles strip 1 each by Other route daily 7/23/19   Verner Notice, MD   Handicap Placard MISC by Does not apply route    Historical Provider, MD       Allergies  Allergies   Allergen Reactions    Codeine     Statins        Review of Systems  Please see HPI above. All bolded are positive. All un-bolded are negative.   Constitutional Symptoms: fever, chills, fatigue, generalized weakness, diaphoresis, increase in thirst, loss of appetite  Eyes: vision change   Ears, Nose, Mouth, Throat: hearing loss, nasal congestion, sores in the mouth  Cardiovascular: chest pain, chest heaviness, palpitations  Respiratory: shortness of breath, wheezing, coughing  Gastrointestinal: abdominal pain, nausea, vomiting, diarrhea, constipation, melena, hematochezia, hematemesis  Genitourinary: dysuria, hematuria, increased frequency  Musculoskeletal: lower extremity edema, myalgias, arthralgias, back pain  Integumentary: rashes, itching   Neurological: headache, lightheadedness, dizziness, confusion, syncope, numbness, tingling, focal weakness  Psychiatric: depression, suicidal ideation, anxiety  Endocrine: unintentional weight change  Hematologic/Lymphatic: lymphadenopathy, easy bruising, easy bleeding   Allergic/Immunologic: recurrent infections      Objective  VITALS:  BP (!) 122/58   Pulse 60   Temp 97.9 °F (36.6 °C) (Oral)   Resp 16   Ht 5' (1.524 m)   Wt 145 lb (65.8 kg)   SpO2 99%   BMI 28.32 kg/m²     Physical Exam:  General: awake, alert, oriented to person only, appears stated age, cooperative, no acute distress, pleasant, appropriate mood  Eyes: conjunctivae/corneas clear, sclera non icteric, EOMI  Ears: no obvious scars, no lesions, no masses, hearing intact  Mouth: mucous membranes moist, no obvious oral sores  Head: normocephalic, atraumatic  Neck: no JVD, no adenopathy, no thyromegaly, neck is supple, trachea is midline  Back: ROM normal, no CVA tenderness.   Chest: no pain on palpation  Lungs: clear to auscultation bilaterally, without rhonchi, crackle, wheezing, or rale, no retractions or use of accessory muscles  Heart: pacemaker in place, regular rate and regular rhythm, no murmur, normal S1, S2  Abdomen: soft, non-tender; bowel sounds normal; no masses, no organomegaly  : Deferred   Extremities: no lower extremity edema, extremities atraumatic, no cyanosis, no clubbing, 2+ pedal pulses palpated  Skin: normal color, normal texture, normal turgor, no rashes, no lesions  Neurologic:5/5 muscle strength throughout, normal muscle tone throughout, face symmetric, hearing intact, tongue midline, speech appropriate without slurring, sensation to fine touch intact in upper and lower extremities    Labs-   Lab Results   Component Value Date    WBC 5.6 06/13/2021    HGB 12.3 06/13/2021    HCT 36.2 06/13/2021     06/13/2021     06/14/2021    K 4.3 06/14/2021     06/14/2021    CREATININE 1.6 (H) 06/14/2021    BUN 42 (H) 06/14/2021    CO2 17 (L) 06/14/2021    GLUCOSE 87 06/14/2021    ALT 6 06/14/2021    AST 21 06/14/2021    INR 1.2 08/24/2011     Lab Results   Component Value Date    CKTOTAL 58 11/19/2010    CKMB 3.7 11/19/2010    TROPONINI <0.01 04/09/2021        Ref. Range 6/13/2021 18:44   Pro-BNP Latest Ref Range: 0 - 450 pg/mL 1,336 (H)      Ref. Range 6/13/2021 18:44   Lipase Latest Ref Range: 13 - 60 U/L 552 (H)     Recent Radiological Studies:  CT Head WO Contrast   Final Result   No acute intracranial abnormality. Periventricular white matter changes consistent chronic microvascular   disease. Diffuse volume loss. CT ABDOMEN PELVIS WO CONTRAST Additional Contrast? None   Final Result   There is a slightly hyperdense lesion arising from the inferior pole of the   left kidney measuring approximately 2.5 cm in diameter with attenuation in   the soft tissue range and indeterminate. A small solid renal mass such as   renal cell carcinoma cannot be excluded. Follow-up recommended. Consider   multiphasic CT of the kidneys or follow-up with ultrasound. Small   hemorrhagic cysts also noted in the left kidney. Colonic diverticulosis with no evidence of diverticulitis. Coarse calcification in the uterus likely related to fibroid.            Echo 6/14/21   Normal left ventricular systolic function.    Ejection fraction is visually estimated at 55%.    Normal right ventricular size and function (TAPSE 1.9 cm).    There is doppler evidence of stage I diastolic dysfunction.    Mild aortic regurgitation.    Physiologic and/or trace tricuspid regurgitation.    PASP is estimated at 27 mmHg (normal estimated PASP). 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.9]     CAD (coronary artery disease) [I25.10]     Cardiomyopathy, nonischemic ([address] [I42.8]        Patient Active Problem List    Diagnosis Date Noted    Failure to thrive in adult 06/14/2021     Priority: High    PHOEEB (acute kidney injury) ([facility] 06/13/2021     Priority: Medium    Late onset Alzheimer's disease with behavioral disturbance ([address] 05/03/2021    Insomnia due to medical condition 05/07/2019    Patient in clinical research study 05/09/2017     WRAP-IT: Enrolled--5/9/2017  Closed      Diabetes 1.5, managed as type 2 ([address] 03/28/2012    CAD (coronary artery disease)        A. Cardia catheterization (3/23/10 by Dr. Georgia Baltazar). D1 - 30-40%  [place] - proximal 30-40%  RCA - ostial 40%      Biventricular implantable cardioverter-defibrillator in situ 09/10/2011     A. Medtronic Protecta XT CRT/D, model [id number], serial # [id number] implanted 9/1/11  B. Rght atrial lead is a Medtronic, model # [id number], serial # [id number]  C. Right ventricular lead is a Medtronic model # [id number], serial [id number]           Cardiomyopathy, nonischemic ([address] 08/18/2011     A. S/P 2-D echocardiogram 6/16/11: LVEF 25%. Stage l diastolic dysfunction. Mild mitral, tricuspid and aortic regurgitation. Moderately increased LA volume  B. S/P 2=D echocardiogram 11/1910:  LVEF 35-40%  C.  S/P adenosine stress test 11/20/10: no ischemia  D. S/P cardiac catheterization 11/23/10: LVEF 30%; minimal coronary artery disease  E. 2-12 EF improved to 60% by echo & 57% by MUGA in 3-12      Bundle branch block, left 08/18/2011    Asthma 08/18/2011       Plan  · Adult failure to thrive with associated nausea-pancreatitis?:  Abdominal exam benign. Lipase elevated. Consult General surgery. NPO with IV fluids  CHF: Echo noted. Cardiology following-case discussed. Follow I&O's. IV diuresis discontinued. Hyponatremia, resolved: Follow BMP. Hold hydrochlorothiazide. DM, controlled: Continue glipizide and Actos. Holding Metformin. Add SSI. HbA1c.  · Hyperdense lesion left kidney: Consult Urology  · Continue home medications  · Follow labs  · DVT prophylaxis. · Please see orders for further management and care. ·  for discharge planning  · Discharge plan: TBD pending clinical improvement     Naomicelina Rinne APRN-CNP  6/14/2021  7:14 AM    I can be reached through [employer]. NOTE:  This report was transcribed using voice recognition software. Every effort was made to ensure accuracy; however, inadvertent computerized transcription errors may be present. Addendum: I have personally participated in a face-to-face history and physical exam on the date of service with the patient. I have discussed the case with the nurse practitioner. I also participated in medical decision making on the date of service and I agree with all of the pertinent clinical information unless indicated in my editing of the note. I have reviewed and edited the note above based on my findings during my history, exam, and decision making on the same day of service. My additional thoughts:   · General surgery evaluation-pancreatitis?   · Case discussed with cardiology  · Urology consult for renal mass  · IVF hydration    Electronically signed by Shady Osman DO on 6/14/2021 at 12:53 PM    I can be reached through

## 2021-06-14 NOTE — ED NOTES
CRIILO faxed to floor. Received confirmation from [place].  Pt ready to go up     [address] [facility], RN  06/13/21 [id number]

## 2021-06-14 NOTE — PROGRESS NOTES
Dr. Carli Monterroso ordered a MRI w w/o contrast. MRI called and stated the patient had a debrilator placed in 2011 that is not compatible with MRI. I called her sister to see if it was changed after the initial placement and she said no.  I let MRI know and sent a message to Dr. Carli Monterroso via [employer] letting him know    Test canceled

## 2021-06-14 NOTE — CONSULTS
the inferior pole of the left kidney measuring 2.5 cm in diameter a small solid renal mass such as renal cell carcinoma cannot be excluded. Colonic diverticulosis with no evidence of diverticulitis. Coarse calcifications in the uterus likely related to a fibroid. ER medications: 1 L IV fluid bolus, Lovenox 30 mg SQ x1. Patient was admitted to a telemetry monitored unit. Cardiology was consulted for CHF. Patient was started on Lasix 20 mg IV daily. Most recent blood pressure 122/58, heart rate 60, room air, afebrile. Please note: past medical records were reviewed per electronic medical record (EMR) - see detailed reports under Past Medical/ Surgical History. Past Medical History:    1. Hypertension  2. Hyperlipidemia  3. Type 2 diabetes mellitus: Hemoglobin A1c 6.0 (5/7/2019)  4. Nonischemic cardiomyopathy /HF with improved EF:  · Cardiac catheterization 3/23/2010: D1: 30-40% stenosis, ramus-proximal 30-40% stenosis, RCA-ostial 40% stenosis. · TTE: 11/19/10:  LVEF 35-40%  · TTE 6/16/11: LVEF 25%. Stage l diastolic dysfunction. Mild mitral, tricuspid and aortic regurgitation. Moderately increased LA volume  · TTE: 2/2012 improved to 60% by echo & 57% by MUGA in 3-12  · TTE: 1/11/17: (Dr. Leo Au): LVEF 60%, no wall motion abnormality, stage I diastolic dysfunction, normal RV function and structure, trace MR, trace TR, RVSP 48 mmHg, trace AR. 5.  CRT-D in situ  · [employer]: DOI: 9/1/2011  · ICD generator change: [employer] CRT-D DOI: 5/9/2017  6. Chronic left bundle branch block  7. Recurrent UTI  8. History of asthma  9. GERD  10. History of left eye retinal detachment  11. History of right carpal tunnel release  12. History of cataract surgery  13. Dementia: On Aricept  14. Depression/anxiety  15. CKD  16. Hx of Gastric ulcers (per niece)     Medications Prior to admit:  Prior to Admission medications    Medication Sig Start Date End Date Taking?  Authorizing Provider   escitalopram (LEXAPRO) 10 MG tablet Take 1 tablet by mouth daily 6/1/21  Yes Martin Dietz MD   donepezil (ARICEPT) 10 MG tablet Take 1 tablet by mouth nightly 5/3/21  Yes Martin Dietz MD   albuterol sulfate HFA (VENTOLIN HFA) 108 (90 Base) MCG/ACT inhaler Inhale 2 puffs into the lungs 4 times daily as needed for Wheezing 4/27/21  Yes Martin Dietz MD   pioglitazone (ACTOS) 30 MG tablet Take 1 tablet by mouth daily 3/27/21  Yes Martin Dietz MD   losartan (COZAAR) 50 MG tablet Take 1 tablet by mouth daily 3/26/21  Yes Martin Dietz MD   nebivolol (BYSTOLIC) 2.5 MG tablet Take 3 tablets by mouth daily 3/26/21  Yes Martin Dietz MD   glipiZIDE (GLUCOTROL) 10 MG tablet Take 1 tablet by mouth 2 times daily (before meals) 3/26/21  Yes Martin Dietz MD   hydroCHLOROthiazide (MICROZIDE) 12.5 MG capsule Take 1 capsule by mouth daily 3/26/21  Yes Martin Dietz MD   metFORMIN (GLUCOPHAGE) 500 MG tablet Take 1 tablet by mouth 2 times daily (with meals) 9/18/20  Yes Martin Dietz MD   potassium chloride (KLOR-CON M) 10 MEQ extended release tablet Take 1 tablet by mouth 3 times daily 3 tablets by mouth every day 6/5/20  Yes Martin Dietz MD   Blood Glucose Monitoring Suppl (Angie Sánchez) w/Device KIT To test blood sugar 5/14/21   Martin Dietz MD   blood glucose test strips (ASCENSIA AUTODISC VI;ONE TOUCH ULTRA TEST VI) strip Test BID 5/14/21   Martin Dietz MD   ibuprofen (ADVIL;MOTRIN) 800 MG tablet  3/26/21   Historical Provider, MD Angie Sánchez strip 1 each by Other route daily 7/23/19   Martin Dietz MD   Handicap Placard MISC by Does not apply route    Historical Provider, MD       Current Medications:    Current Facility-Administered Medications: sodium chloride flush 0.9 % injection 10 mL, 10 mL, Intravenous, 2 times per day  sodium chloride flush 0.9 % injection 10 mL, 10 mL, Intravenous, PRN  0.9 % sodium chloride infusion, 25 mL, Intravenous, PRN  enoxaparin (LOVENOX) injection 30 mg, 30 mg, Subcutaneous, Daily  ondansetron (ZOFRAN-ODT) disintegrating tablet 4 mg, 4 mg, Oral, Q8H PRN **OR** ondansetron (ZOFRAN) injection 4 mg, 4 mg, Intravenous, Q6H PRN  senna (SENOKOT) tablet 8.6 mg, 1 tablet, Oral, Daily PRN  acetaminophen (TYLENOL) tablet 650 mg, 650 mg, Oral, Q6H PRN **OR** acetaminophen (TYLENOL) suppository 650 mg, 650 mg, Rectal, Q6H PRN  potassium chloride (KLOR-CON M) extended release tablet 10 mEq, 10 mEq, Oral, TID  losartan (COZAAR) tablet 50 mg, 50 mg, Oral, Daily  glipiZIDE (GLUCOTROL) tablet 10 mg, 10 mg, Oral, BID AC  pioglitazone (ACTOS) tablet 30 mg, 30 mg, Oral, Daily  donepezil (ARICEPT) tablet 10 mg, 10 mg, Oral, Nightly  escitalopram (LEXAPRO) tablet 10 mg, 10 mg, Oral, Daily  nebivolol (BYSTOLIC) tablet 7.5 mg, 7.5 mg, Oral, Daily  insulin lispro (HUMALOG) injection vial 0-12 Units, 0-12 Units, Subcutaneous, TID WC  insulin lispro (HUMALOG) injection vial 0-6 Units, 0-6 Units, Subcutaneous, Nightly  glucose (GLUTOSE) 40 % oral gel 15 g, 15 g, Oral, PRN  dextrose 50 % IV solution, 12.5 g, Intravenous, PRN  glucagon (rDNA) injection 1 mg, 1 mg, Intramuscular, PRN  dextrose 5 % solution, 100 mL/hr, Intravenous, PRN  furosemide (LASIX) injection 20 mg, 20 mg, Intravenous, Daily    Allergies:  Codeine and Statins    Social History: The following information was obtained from the patient's niece (via telephone)  No prior tobacco, alcohol or illicit drug use. Family History: Noncontributory due to advanced age. REVIEW OF SYSTEMS:   Unable to fully assess due to the patient is a limited historian. · Constitutional: +fatigue, fevers, chills or night sweats  · ENT: Denies headaches or hearing loss. No mouth sores or sore throat. No epistaxis   · Cardiovascular: Denies chest pain, pressure or palpitations. No lower extremity swelling. · Respiratory: Denies BURGER, cough, orthopnea or PND.  No hemoptysis     PHYSICAL EXAM:   BP (!) 122/58   Pulse 60   Temp 97.9 °F (36.6 °C) (Oral)   Resp 16   Ht 5' (1.524 m)   Wt 145 lb (65.8 kg)   SpO2 99%   BMI 28.32 kg/m²   CONST:  Well developed, well nourished elderly female who appears of stated age. Awake, alert and cooperative. Pleasantly confused. No apparent distress. HEENT:   Head- Normocephalic, atraumatic   Eyes- Conjunctivae pink, anicteric  Throat- Oral mucosa pink and moist  Neck-  No stridor, trachea midline, no jugular venous distention. No carotid bruit. CHEST: Chest symmetrical and non-tender to palpation. No accessory muscle use or intercostal retractions  RESPIRATORY: Lung sounds - clear throughout fields. On RA. CARDIOVASCULAR:     Heart Inspection- shows no noted pulsations  Heart Palpation- no heaves or thrills; PMI is non-displaced   Heart Ausculation- Regular rate and rhythm, no murmur. No s3, s4 or rub   PV: No lower extremity edema. No varicosities. Pedal pulses palpable, no clubbing or cyanosis   ABDOMEN: Soft, non-tender to light palpation. Bowel sounds present. No palpable masses no organomegaly; no abdominal bruit  MS: Good muscle strength and tone. No atrophy or abnormal movements. : Deferred  SKIN: Warm and dry no statis dermatitis or ulcers   NEURO / PSYCH: Oriented to person only. Speech clear and appropriate. Follows all commands. Pleasantly confused. DATA:    ECG: See HPI. Tele strips: Non-telemetry monitored unit. Diagnostic:    Labs:   CBC:   Recent Labs     06/13/21  1844   WBC 5.6   HGB 12.3   HCT 36.2        BMP:   Recent Labs     06/13/21  1844 06/14/21  0330   * 132   K 4.2 4.3   CO2 20* 17*   BUN 46* 42*   CREATININE 1.9* 1.6*   LABGLOM 25 30   CALCIUM 10.2 9.3     Mag: No results for input(s): MG in the last 72 hours. Phos: No results for input(s): PHOS in the last 72 hours.   TFT:   Lab Results   Component Value Date    TSH 2.290 05/12/2020    U0ZQSLJ 8.7 05/20/2011    T4FREE 1.08 07/12/2012      HgA1c:   Lab Results   Component Value Date    LABA1C 11.3 (H) 03/26/2021     No results found for: EAG  proBNP:   Recent Labs     06/13/21  1844   PROBNP 1,336*     PT/INR: No results for input(s): PROTIME, INR in the last 72 hours. APTT:No results for input(s): APTT in the last 72 hours. CARDIAC ENZYMES:  Recent Labs     06/13/21  1844   TROPHS 26*     FASTING LIPID PANEL:  Lab Results   Component Value Date    CHOL 221 05/12/2020    HDL 35 05/12/2020    LDLCALC - 05/12/2020    TRIG 414 05/12/2020     LIVER PROFILE:  Recent Labs     06/13/21  1844 06/14/21  0330   AST 21 21   ALT 12 6   LABALBU 4.2 3.5     CXR: 6/14/2021 pending     CT Abdomen/Pelvis: 6/13/2021  There is a slightly hyperdense lesion arising from the inferior pole of the   left kidney measuring approximately 2.5 cm in diameter with attenuation in   the soft tissue range and indeterminate.  A small solid renal mass such as   renal cell carcinoma cannot be excluded.  Follow-up recommended.  Consider   multiphasic CT of the kidneys or follow-up with ultrasound.  Small   hemorrhagic cysts also noted in the left kidney.       Colonic diverticulosis with no evidence of diverticulitis.       Coarse calcification in the uterus likely related to fibroid. CT head without contrast: 6/13/2021  No acute intracranial abnormality.       Periventricular white matter changes consistent chronic microvascular   disease.  Diffuse volume loss. TTE: 1/11/2017:    Normal left ventricle size and systolic function   Stage I diastolic dysfunction   Trace mitral regurgitation   Trace aortic regurgitation   RVSP is 48 mmHg. Assessment:  1. Admitted for worsening AMS, hypoglycemia and hypotension  2. Chronic HF with improved EF: LVEF 25% on TTE 6/2011---> LVEF 60% 2017. 3. Hypovolemic  4. NICMP s/p CRT-D   5. Cardiac cath (2010): showing minimal CAD. 6. cLBBB  7. Acute on CKD: Baseline ~1.3-1.5. Improving with IVF. 8. Hypotension: improving. Hx of HTN. 9. HLD  10. T2DM  11. GERD  12. Dementia   13. Hyponatremia     Plan:  1. Start IV fluids /discontinue IV diuretics  2. Check 2D Echocardiogram  3. Hold losartan and hydrochlorothiazide  4. Continue Bystolic (with parameters)   5. Further recommendations pending the above clinical course. Above assessment and plan as per Dr. Clark Morocho.  Electronically signed by LUCÍA Salguero CNP on 6/14/21 at 9:40 AM EDT    _______________________________________________________________________  I independently interviewed and examined the patient. I have reviewed the above documentation completed by the JOSE. Please see my additional contributions to the HPI, physical exam, and assessment / medical decision making. HPI, ROS, PMH, PSH, [address], [place], and medications independently reviewed (agree; see above documentation)    History of Present Illness:  Currently with no chest pain, respiratory distress, or palpitations. AS/ on EKG.     Review of Systems:   Cardiac: As per HPI  General: No fever, chills  Pulmonary: As per HPI  HEENT: No visual disturbances, difficult swallowing  GI: No nausea, vomiting  : No dysuria, hematuria  Endocrine: No thyroid disease, +DM  Musculoskeletal: JULIAN x 4, no focal motor deficits  Skin: Intact, no rashes  Neuro: No headache, seizures  Psych: Currently with no depression, anxiety    Physical Exam:  BP (!) 105/55   Pulse 69   Temp 98.2 °F (36.8 °C) (Oral)   Resp 16   Ht 5' (1.524 m)   Wt 145 lb (65.8 kg)   SpO2 100%   BMI 28.32 kg/m²   Wt Readings from Last 3 Encounters:   06/14/21 145 lb (65.8 kg)   03/26/21 157 lb 6.4 oz (71.4 kg)   12/01/20 157 lb (71.2 kg)     Appearance: Awake, alert, no acute respiratory distress  Skin: Intact, no rash  Head: Normocephalic, atraumatic  Eyes: EOMI, no conjunctival erythema  ENMT: No pharyngeal erythema, MMM, no rhinorrhea  Neck: Supple, no carotid bruits  Lungs: Decreased BS B/L, no wheezing  Cardiac: Regular rate and rhythm, +S1S2, no murmurs apparent  Abdomen: Soft, nontender, +bowel sounds  Extremities: Moves all extremities x 4, no lower extremity edema  Neurologic: No focal motor deficits apparent, normal mood and affect    Laboratory Tests:  Recent Labs     06/13/21  1749 06/13/21  1844 06/14/21  0330   NA  --  127* 132   K  --  4.2 4.3   CL  --  95* 105   CO2  --  20* 17*   BUN  --  46* 42*   CREATININE  --  1.9* 1.6*   GLUCOSE 171 159* 87   CALCIUM  --  10.2 9.3     Lab Results   Component Value Date    MG 2.0 11/21/2010     Recent Labs     06/13/21  1844 06/14/21  0330   ALKPHOS 71 54   ALT 12 6   AST 21 21   PROT 7.1 6.2*   BILITOT 0.3 0.3   LABALBU 4.2 3.5     Recent Labs     06/13/21  1844   WBC 5.6   RBC 3.87   HGB 12.3   HCT 36.2   MCV 93.5   MCH 31.8   MCHC 34.0   RDW 13.4      MPV 10.0     Lab Results   Component Value Date    CKTOTAL 58 11/19/2010    CKMB 3.7 11/19/2010    TROPONINI <0.01 04/09/2021    TROPONINI 0.10 11/19/2010    TROPONINI 0.10 11/18/2010     Lab Results   Component Value Date    TSH 2.290 05/12/2020     Lab Results   Component Value Date    LABA1C 7.9 (H) 06/13/2021     No results found for: EAG  Lab Results   Component Value Date    CHOL 221 (H) 05/12/2020    CHOL 224 (H) 05/07/2019    CHOL 240 (H) 05/09/2017     Lab Results   Component Value Date    TRIG 414 (H) 05/12/2020    TRIG 267 (H) 05/07/2019    TRIG 175 (H) 05/09/2017     Lab Results   Component Value Date    HDL 35 05/12/2020    HDL 40 05/07/2019    HDL 44 05/09/2017     Lab Results   Component Value Date    LDLCALC - (AA) 05/12/2020    LDLCALC 131 (H) 05/07/2019    LDLCALC 161 (H) 05/09/2017     Lab Results   Component Value Date    LABVLDL - (AA) 05/12/2020    LABVLDL 53 05/07/2019    LABVLDL 35 05/09/2017     No results found for: CHOLHDLRATIO  Recent Labs     06/13/21  1844   PROBNP 1,336*       Cardiac Tests:  EKG reviewed (EKG date: 6/13/21): AS/, rate 62    Echocardiogram reviewed: 6/14/21   Normal left ventricular systolic function. Ejection fraction is visually estimated at 55%. Normal right ventricular size and function (TAPSE 1.9 cm).    There is doppler evidence of stage I diastolic dysfunction. Mild aortic regurgitation. Physiologic and/or trace tricuspid regurgitation. PASP is estimated at 27 mmHg (normal estimated PASP). - Decreased oral intake/fluid intake as an outpatient  - She does not appear volume overloaded -- will stop IV diuretic  - Will order IV fluids for today  - Monitor BMP (Cr improved since admission after receiving IV fluids on 6/13/21)  - Results of today's echocardiogram outlined above  - Hold losartan and hydrochlorothiazide  - Continue Bystolic (with parameters)   - No advanced cardiac testing planned otherwise    Thank you for allowing me to participate in your patient's care. Please feel free to contact me if you have any questions or concerns.     Xu Haywood MD  [facility]) Cardiology

## 2021-06-14 NOTE — PROGRESS NOTES
Occupational Therapy  OCCUPATIONAL THERAPY INITIAL EVALUATION      Date:2021  Patient Name: Miri Officer  MRN: [id number]  : 1932  Room: [address] [place] [address], [address] [place] [facility], [place]          VDCD:                                                  Patient Name: Miri Officer    MRN: [id number]    : 1932    Room: [address] [place] [address]      Evaluating OT: Edwin Rodgers OTR/L   [id number]      Referring Provider:Salvador Martines DO    Specific Provider Orders/Date:OT eval and treat 2021      Diagnosis: PHOEBE     Pertinent Medical History: Dementia, HTN, arthritis, asthma, DM,    Precautions:  Fall Risk, alarm     Assessment of current deficits    [x] Functional mobility  [x]ADLs  [x] Strength               [x]Cognition    [x] Functional transfers   [x] IADLs         [x] Safety Awareness   [x]Endurance    [] Fine Coordination              [x] Balance      [] Vision/perception   []Sensation     []Gross Motor Coordination  [] ROM  [] Delirium                   [] Motor Control     OT PLAN OF CARE   OT POC based on physician orders, patient diagnosis and results of clinical assessment    Frequency/Duration  1-3days/wk for 2 weeks PRN   Specific OT Treatment Interventions to include:   [x]ADL retraining/adapted techniques and AE recommendations to increase functional independence within precautions                    [x]Energy conservation techniques to improve tolerance for selfcare routine   [x]Functional transfer/mobility training/DME recommendations for increased independence, safety and fall prevention         [x]Patient/family education to increase safety and functional independence             [x]Environmental modifications for safe mobility and completion of ADLs                             [x]Therapeutic activity to improve functional performance during ADLs. [x]Therapeutic exercise to improve tolerance and functional strength for ADLs   [x]Balance retraining/tolerance tasks for facilitation of postural control with dynamic challenges during ADLs . [x]Positioning to improve functional independence      Recommended Adaptive Equipment: TBD     Patient poor historian:   Home Living: Pt lives alone, ( recently passed away)  - unsure why she is hospital or how she got here  Therapist spoke with patients sister on phone - she reports patient lives alone but has been staying with her lately. She uses a walker to her around sometimes in the house     Pain Level: no pain this session ;   Cognition: A&O: 2/4; (person and stated she was in hospital)  Unable to recall date and her current situation of why she is her.   Tearful at times   Memory:  Forgetful    Sequencing:  Fair    Problem solving:  poor   Judgement/safety:  poor     Functional Assessment:  AM-PAC Daily Activity Raw Score: 16/24   Initial Eval Status  Date: 6/14/21 Treatment Status  Date: STGs = LTGs  Time frame: 10-14 days   Feeding NPO  Independent    Grooming SBA/set-up   Standing at sink washing hands   Mod I    UB Dressing Min A  Mod i   LB Dressing Min A  Supervision    Bathing Min a  Supervision    Toileting Supervision   Independent    Bed Mobility  SBA  Supine <>sit  Independent    Functional Transfers Min A  Sit-stand from bed, commode   Mod I    Functional Mobility Min A, w/walker   Ambulated to/from bathoom  Supervision  with good tolerance    Balance Sitting:     Static:  Supervision- EOB     Dynamic:Min A  Standing: CGA/SBA   Independent/supervision    Activity Tolerance Fair with light activity   Goo=od  with ADL activity    Visual/  Perceptual Glasses: none by bedside                 Hand Dominance right   AROM (PROM) Strength Additional Info:    RYAN  WFL WFL good  and wfl FMC/dexterity noted during ADL tasks       TE[facility] WFL good  and wfl FMC/dexterity noted during ADL

## 2021-06-15 LAB
ADENOVIRUS BY PCR: NOT DETECTED
ALBUMIN SERPL-MCNC: 3.5 G/DL (ref 3.5–5.2)
ALP BLD-CCNC: 51 U/L (ref 35–104)
ALT SERPL-CCNC: 9 U/L (ref 0–32)
ANION GAP SERPL CALCULATED.3IONS-SCNC: 7 MMOL/L (ref 7–16)
AST SERPL-CCNC: 17 U/L (ref 0–31)
BASOPHILS ABSOLUTE: 0.03 E9/L (ref 0–0.2)
BASOPHILS RELATIVE PERCENT: 0.5 % (ref 0–2)
BILIRUB SERPL-MCNC: 0.3 MG/DL (ref 0–1.2)
BORDETELLA PARAPERTUSSIS BY PCR: NOT DETECTED
BORDETELLA PERTUSSIS BY PCR: NOT DETECTED
BUN BLDV-MCNC: 30 MG/DL (ref 6–23)
CALCIUM SERPL-MCNC: 9.4 MG/DL (ref 8.6–10.2)
CHLAMYDOPHILIA PNEUMONIAE BY PCR: NOT DETECTED
CHLORIDE BLD-SCNC: 106 MMOL/L (ref 98–107)
CO2: 18 MMOL/L (ref 22–29)
CORONAVIRUS 229E BY PCR: NOT DETECTED
CORONAVIRUS HKU1 BY PCR: NOT DETECTED
CORONAVIRUS NL63 BY PCR: NOT DETECTED
CORONAVIRUS OC43 BY PCR: NOT DETECTED
CREAT SERPL-MCNC: 1.4 MG/DL (ref 0.5–1)
EOSINOPHILS ABSOLUTE: 0.08 E9/L (ref 0.05–0.5)
EOSINOPHILS RELATIVE PERCENT: 1.5 % (ref 0–6)
GFR AFRICAN AMERICAN: 43
GFR NON-AFRICAN AMERICAN: 35 ML/MIN/1.73
GLUCOSE BLD-MCNC: 103 MG/DL (ref 74–99)
HCT VFR BLD CALC: 32.4 % (ref 34–48)
HEMOGLOBIN: 10.7 G/DL (ref 11.5–15.5)
HUMAN METAPNEUMOVIRUS BY PCR: NOT DETECTED
HUMAN RHINOVIRUS/ENTEROVIRUS BY PCR: NOT DETECTED
IMMATURE GRANULOCYTES #: 0.02 E9/L
IMMATURE GRANULOCYTES %: 0.4 % (ref 0–5)
INFLUENZA A BY PCR: NOT DETECTED
INFLUENZA B BY PCR: NOT DETECTED
LIPASE: 173 U/L (ref 13–60)
LYMPHOCYTES ABSOLUTE: 2.56 E9/L (ref 1.5–4)
LYMPHOCYTES RELATIVE PERCENT: 46.7 % (ref 20–42)
MCH RBC QN AUTO: 31.3 PG (ref 26–35)
MCHC RBC AUTO-ENTMCNC: 33 % (ref 32–34.5)
MCV RBC AUTO: 94.7 FL (ref 80–99.9)
METER GLUCOSE: 105 MG/DL (ref 74–99)
METER GLUCOSE: 113 MG/DL (ref 74–99)
METER GLUCOSE: 122 MG/DL (ref 74–99)
METER GLUCOSE: 82 MG/DL (ref 74–99)
MONOCYTES ABSOLUTE: 0.49 E9/L (ref 0.1–0.95)
MONOCYTES RELATIVE PERCENT: 8.9 % (ref 2–12)
MYCOPLASMA PNEUMONIAE BY PCR: NOT DETECTED
NEUTROPHILS ABSOLUTE: 2.3 E9/L (ref 1.8–7.3)
NEUTROPHILS RELATIVE PERCENT: 42 % (ref 43–80)
PARAINFLUENZA VIRUS 1 BY PCR: NOT DETECTED
PARAINFLUENZA VIRUS 2 BY PCR: NOT DETECTED
PARAINFLUENZA VIRUS 3 BY PCR: NOT DETECTED
PARAINFLUENZA VIRUS 4 BY PCR: NOT DETECTED
PDW BLD-RTO: 13.4 FL (ref 11.5–15)
PLATELET # BLD: 201 E9/L (ref 130–450)
PMV BLD AUTO: 9.7 FL (ref 7–12)
POTASSIUM REFLEX MAGNESIUM: 4.3 MMOL/L (ref 3.5–5)
RBC # BLD: 3.42 E12/L (ref 3.5–5.5)
RESPIRATORY SYNCYTIAL VIRUS BY PCR: NOT DETECTED
SARS-COV-2, PCR: NOT DETECTED
SODIUM BLD-SCNC: 131 MMOL/L (ref 132–146)
TOTAL PROTEIN: 6 G/DL (ref 6.4–8.3)
WBC # BLD: 5.5 E9/L (ref 4.5–11.5)

## 2021-06-15 PROCEDURE — 80053 COMPREHEN METABOLIC PANEL: CPT

## 2021-06-15 PROCEDURE — 83690 ASSAY OF LIPASE: CPT

## 2021-06-15 PROCEDURE — 0202U NFCT DS 22 TRGT SARS-COV-2: CPT

## 2021-06-15 PROCEDURE — 36415 COLL VENOUS BLD VENIPUNCTURE: CPT

## 2021-06-15 PROCEDURE — 99232 SBSQ HOSP IP/OBS MODERATE 35: CPT | Performed by: SURGERY

## 2021-06-15 PROCEDURE — 6370000000 HC RX 637 (ALT 250 FOR IP): Performed by: INTERNAL MEDICINE

## 2021-06-15 PROCEDURE — 97161 PT EVAL LOW COMPLEX 20 MIN: CPT

## 2021-06-15 PROCEDURE — 88112 CYTOPATH CELL ENHANCE TECH: CPT

## 2021-06-15 PROCEDURE — 6360000002 HC RX W HCPCS: Performed by: INTERNAL MEDICINE

## 2021-06-15 PROCEDURE — 1200000000 HC SEMI PRIVATE

## 2021-06-15 PROCEDURE — 85025 COMPLETE CBC W/AUTO DIFF WBC: CPT

## 2021-06-15 PROCEDURE — 82962 GLUCOSE BLOOD TEST: CPT

## 2021-06-15 PROCEDURE — 99233 SBSQ HOSP IP/OBS HIGH 50: CPT | Performed by: INTERNAL MEDICINE

## 2021-06-15 RX ADMIN — NEBIVOLOL HYDROCHLORIDE 7.5 MG: 5 TABLET ORAL at 09:00

## 2021-06-15 RX ADMIN — GLIPIZIDE 10 MG: 5 TABLET ORAL at 15:56

## 2021-06-15 RX ADMIN — POTASSIUM CHLORIDE 10 MEQ: 750 TABLET, EXTENDED RELEASE ORAL at 15:00

## 2021-06-15 RX ADMIN — ENOXAPARIN SODIUM 30 MG: 30 INJECTION SUBCUTANEOUS at 09:00

## 2021-06-15 RX ADMIN — ESCITALOPRAM OXALATE 10 MG: 10 TABLET ORAL at 09:00

## 2021-06-15 RX ADMIN — PIOGLITAZONE 30 MG: 15 TABLET ORAL at 09:00

## 2021-06-15 RX ADMIN — GLIPIZIDE 10 MG: 5 TABLET ORAL at 06:46

## 2021-06-15 RX ADMIN — POTASSIUM CHLORIDE 10 MEQ: 750 TABLET, EXTENDED RELEASE ORAL at 22:31

## 2021-06-15 RX ADMIN — POTASSIUM CHLORIDE 10 MEQ: 750 TABLET, EXTENDED RELEASE ORAL at 09:00

## 2021-06-15 RX ADMIN — DONEPEZIL HYDROCHLORIDE 10 MG: 5 TABLET, FILM COATED ORAL at 22:31

## 2021-06-15 ASSESSMENT — PAIN SCALES - GENERAL: PAINLEVEL_OUTOF10: 0

## 2021-06-15 NOTE — PROGRESS NOTES
Physical Therapy    Facility/Department: [address] MED SURG  Initial Assessment    NAME: Santana Wen  : 1932  MRN: [id number]    Date of Service: 6/15/2021       REQUIRES PT FOLLOW UP: Yes       Patient Diagnosis(es): The primary encounter diagnosis was PHOEBE (acute kidney injury) ([facility] Diagnoses of Dehydration, Altered mental status, unspecified altered mental status type, and Congestive heart failure, unspecified HF chronicity, unspecified heart failure type [facility]) were also pertinent to this visit. has a past medical history of Arthritis, Asthma, CAD (coronary artery disease), Diabetes mellitus ([facility] GERD (gastroesophageal reflux disease), Hyperlipidemia, Hypertension, ICD (implantable cardiac defibrillator) battery depletion, LBBB (left bundle branch block), and Nonischemic cardiomyopathy ([facility] has a past surgical history that includes Cataract removal with implant (); Retinal detachment surgery (); Carpal tunnel release; Diagnostic Cardiac Cath Lab Procedure (3/23/10); Cardiac defibrillator placement (2011); and Cardiac defibrillator placement (Left, 2017). Evaluating Therapist: Perry Stokes, PT     Referring Provider:   Dr. Nadiya Montemayor     PT order :  Pt eval and treat     Room #:  443   DIAGNOSIS:  Failure to thrive in adult   PRECAUTIONS: falls, [employer]      Social:  Pt lives alone  in a 1 floor plan. per chart. Pt reports she lives with her spouse ( recently passed, per chart)   Prior to admission pt walked with  No AD per pt   Pt a poor historian      Initial Evaluation  Date: 6/15/2021  Treatment      Short Term/ Long Term   Goals   Was pt agreeable to Eval/treatment?   yes      Does pt have pain?  none reported      Bed Mobility  Rolling:  SBA   Supine to sit:  SBA   Sit to supine:  SBA   Scooting:  Min assist in sit    S/I    Transfers Sit to stand:  Min assist   Stand to sit: CGA   Stand pivot:  NT   S/SBA    Ambulation     10  feet with no AD  with  CGA/min assist. 40 feet x 1 with ww CGA    100  feet with ww with  SBA        Stair negotiation: ascended and descended Nt    4  steps with  1 rail with   CGA   LE ROM  WFL     LE strength  4-/ 5      AM- PAC RAW score   16/ 24            Pt is alert and Oriented x 2      Balance: CGA/min assist, high fall risk due to decreased balance and safety awareness  Endurance: decreased   Bed/Chair alarm: Yes      ASSESSMENT  Pt displays functional ability as noted in the objective portion of this evaluation. Conditions Requiring Skilled Therapeutic Intervention:    [x]Decreased strength     []Decreased ROM  [x]Decreased functional mobility  [x]Decreased balance   [x]Decreased endurance   []Decreased posture  []Decreased sensation  []Decreased coordination   []Decreased vision  [x]Decreased safety awareness   []Increased pain             Treatment/Education:    Upon arrival, pt in bed; tearful and stating she wants to go home. Mobility as above. Short distance gait with no AD CGA/min assist. High fall risk. CGA gait with use of ww, but still a fall risk. Cues needed for proper ww use. Safety concerns with pt returning home alone due to fall risk and decreased cognition and safety. Pt educated on fall risk, safe and proper technique with mobility       Patient response to education:   Pt verbalized understanding Pt demonstrated skill Pt requires further education in this area    Needs assist.cues  x       Comments:  Pt left in bed per pt request after session, with call light in reach. Rehab potential is Good for reaching above PT goals. Pts/ family goals   1. Home     Patient and or family understand(s) diagnosis, prognosis, and plan of care. -  Yes     PLAN  PT care will be provided in accordance with the objectives noted above. Whenever appropriate, clear delegation orders will be provided for nursing staff.   Exercises and functional mobility practice will be used as well as appropriate assistive devices or modalities to obtain goals. Patient and family education will also be administered as needed. PLAN OF CARE:    Current Treatment Recommendations     [x] Strengthening to improve independence with functional mobility   [] ROM to improve independence with functional mobility   [x] Balance Training to improve static/dynamic balance and to reduce fall risk  [x] Endurance Training to improve activity tolerance during functional mobility   [x] Transfer Training to improve safety and independence with all functional transfers   [x] Gait Training to improve gait mechanics, endurance and assess need for appropriate assistive device  [x] Stair Training in preparation for safe discharge home and/or into the community   [] Positioning to prevent skin breakdown and contractures  [x] Safety and Education Training   [x] Patient/Caregiver Education   [] HEP  [] Other     Frequency of treatments will be 2-5x/week x 7-10 days. Time in: 1459   Time out: 1517      Evaluation Time includes thorough review of current medical information, gathering information on past medical history/social history and prior level of function, completion of standardized testing/informal observation of tasks, assessment of data and education on plan of care and goals.     CPT codes:  [x] Low Complexity PT evaluation 66056  [] Moderate Complexity PT evaluation 19812  [] High Complexity PT evaluation 02025  [] PT Re-evaluation 56976  [] Gait training 85377  minutes  [] Therapeutic activities 71339  minutes  [] Therapeutic exercises 74579  minutes  [] Neuromuscular reeducation 37318  minutes       Johny Lee number:  PT [id number]

## 2021-06-15 NOTE — DISCHARGE SUMMARY
Internal Medicine Discharge Summary    NAME: Author Baltazar :  1932  MRN:  [id number] Jamey Dubois MD    ADMITTED: 2021   DISCHARGED: 2021  6:02 PM    ADMITTING PHYSICIAN: Kelly López DO    PCP: Norma Brennan MD    CONSULTANT(S):   IP CONSULT TO INTERNAL MEDICINE  IP CONSULT TO SOCIAL WORK  IP CONSULT TO SOCIAL WORK  IP CONSULT TO CARDIOLOGY  IP CONSULT TO GENERAL SURGERY  IP CONSULT TO UROLOGY  IP CONSULT TO IV TEAM  IP CONSULT TO IV TEAM     ADMITTING DIAGNOSIS:   PHOEBE (acute kidney injury) ([address] [N17.9]     Please see H&P for further details    DISCHARGE DIAGNOSES:   Active Hospital Problems    Diagnosis     Failure to thrive in adult [R62.7]      Priority: High    PHOEBE (acute kidney injury) ([address] [N17.9]      Priority: Medium    Mild protein-calorie malnutrition ([address] [E44.1]     Late onset Alzheimer's disease with behavioral disturbance ([address] [G30.1, F02.81]     Insomnia due to medical condition [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 female patient of Norma Brennan MD who  has a past medical history of Arthritis, Asthma, CAD (coronary artery disease), Diabetes mellitus ([address] GERD (gastroesophageal reflux disease), Hyperlipidemia, Hypertension, ICD (implantable cardiac defibrillator) battery depletion, LBBB (left bundle branch block), and Nonischemic cardiomyopathy ([address] who originally had concerns including Hypotension ( in triage), Hyperglycemia, and Altered Mental Status. at presentation on 2021, and was found to have PHOEBE (acute kidney injury) ([address] [N17.9] after workup. Please see H&P for further details. HOSPITAL COURSE:   The patient presented to the hospital with the chief complaint of Hypotension ( in triage), Hyperglycemia, and Altered Mental Status.  The patient was admitted to the hospital.     · Adult failure to thrive with associated nausea, unlikely pancreatitis:  Abdominal exam benign.  Lipase elevated. General surgery following-case discussed on 6/15. Unable to get MRCP due to ICD. Tolerating general diet. DC IVF hydration. · CHF: Echo noted. Cardiology following. Follow I&O's. IV diuresis discontinued. · Hyponatremia, resolved: Follow BMP.  Hold hydrochlorothiazide. · DM, controlled: Continue glipizide and Actos. Restart Metformin. SSI. HbA1c 7.9%. · Hyperdense lesion left kidney: Urology following. Renal US noted.   · PT AM-PAC 16/24  · DC'ed to SNF    BRIEF PHYSICAL EXAMINATION AND LABORATORIES ON DAY OF DISCHARGE:  VITALS:  BP (!) 125/54   Pulse 63   Temp 98 °F (36.7 °C) (Oral)   Resp 16   Ht 5' (1.524 m)   Wt 154 lb (69.9 kg)   SpO2 98%   BMI 30.08 kg/m²     General: AAO to person only, NAD, no labored breathing  Eyes: conjunctivae/corneas clear, sclera non icteric  Skin: color/texture/turgor normal, no rashes or lesions  Lungs: CTAB, no retractions/use of accessory muscles, no vocal fremitus, no rhonchi, no crackle, no rales  Heart: regular rate, regular rhythm, no murmur  Abdomen: soft, NT, bowel sounds normal  Extremities: atraumatic, no edema  Neurologic: cranial nerves 2-12 grossly intact, no slurred speech    LABS[de-identified]  Recent Labs     06/14/21  0330 06/15/21  0440 06/16/21  0359    131* 131*   K 4.3 4.3 4.4    106 107   CO2 17* 18* 17*   BUN 42* 30* 23   CREATININE 1.6* 1.4* 1.3*   GLUCOSE 87 103* 96   CALCIUM 9.3 9.4 9.0     Recent Labs     06/14/21  0330 06/15/21  0440 06/16/21  0359   ALKPHOS 54 51 53   ALT 6 9 9   AST 21 17 18   PROT 6.2* 6.0* 5.7*   BILITOT 0.3 0.3 0.3   LABALBU 3.5 3.5 3.3*     Recent Labs     06/13/21  1844 06/15/21  0440 06/16/21  0359   WBC 5.6 5.5 5.6   RBC 3.87 3.42* 3.23*   HGB 12.3 10.7* 10.0*   HCT 36.2 32.4* 30.8*   MCV 93.5 94.7 95.4   MCH 31.8 31.3 31.0   MCHC 34.0 33.0 32.5   RDW 13.4 13.4 13.5    201 201   MPV 10.0 9.7 9.8     Lab Results   Component Value Date    LABA1C 7.9 (H) 06/13/2021    LABA1C 11.3 (H) 03/26/2021    LABA1C 8.8 09/16/2020     Lab Results   Component Value Date    INR 1.2 08/24/2011    INR 1.1 11/23/2010    PROTIME 11.5 08/24/2011    PROTIME 11.8 11/23/2010      Lab Results   Component Value Date    TSH 2.290 05/12/2020    TSH 2.470 05/07/2019    TSH 2.930 05/09/2017     Lab Results   Component Value Date    TRIG 414 (H) 05/12/2020    TRIG 267 (H) 05/07/2019    TRIG 175 (H) 05/09/2017    HDL 35 05/12/2020    HDL 40 05/07/2019    HDL 44 05/09/2017    LDLCALC - (AA) 05/12/2020    LDLCALC 131 (H) 05/07/2019    LDLCALC 161 (H) 05/09/2017     No results for input(s): MG in the last 72 hours. No results for input(s): CKTOTAL, CKMB, TROPONINI in the last 72 hours. No results for input(s): LACTA in the last 72 hours. IMAGING:  Echo Complete    Result Date: 6/14/2021  Transthoracic Echocardiography Report (TTE)  Demographics   Patient Name        Shameka Shields  Gender               Female                      L   Medical Record      [id number]         Room Number          9201  Number   Account #           [de-identified]        Procedure Date       06/14/2021   Corporate ID                         Ordering Physician   Wayne Mix MD   Accession Number    [id number]       Referring Physician  Sajan Armas   Date of Birth       09/21/1932       Sonographer          Arlyn Diallo [facility]   Age                 80 year(s)       Interpreting         Wayne Mix MD                                       Physician                                        Any Other  Procedure Type of Study   TTE procedure:Echo Complete W/Doppler & Color Flow. Procedure Date Date: 06/14/2021 Start: 08:15 AM Study Location: Portable Technical Quality: Adequate visualization Indications:LV function.  Patient Status: Routine Height: 60 inches Weight: 145 pounds BSA: 1.63 m^2 BMI: 28.32 kg/m^2 HR: 61 bpm BP: 122/58 mmHg  Findings Left Ventricle  Left ventricle size is normal.  Normal left ventricular wall thickness. Normal left ventricular systolic function. Ejection fraction is visually estimated at 55%. There is doppler evidence of stage I diastolic dysfunction. Right Ventricle  Normal right ventricular size and function (TAPSE 1.9 cm). Left Atrium  Normal sized left atrium by volume index. Right Atrium  Normal sized right atrium. Mitral Valve  Physiologic and/or trace mitral regurgitation. No evidence of hemodynamically significant mitral stenosis. Tricuspid Valve  Physiologic and/or trace tricuspid regurgitation. PASP is estimated at 27 mmHg (normal estimated PASP). Aortic Valve  No evidence of hemodynamically significant aortic stenosis. Mild aortic regurgitation. Pulmonic Valve  The pulmonic valve was not well visualized. Pericardial Effusion  No evidence of a hemodynamically significant pericardial effusion. Aorta  Aortic root dimension within normal limits. Conclusions   Summary  Normal left ventricular systolic function. Ejection fraction is visually estimated at 55%. Normal right ventricular size and function (TAPSE 1.9 cm). There is doppler evidence of stage I diastolic dysfunction. Mild aortic regurgitation. Physiologic and/or trace tricuspid regurgitation. PASP is estimated at 27 mmHg (normal estimated PASP).    Signature   ----------------------------------------------------------------  Electronically signed by Steve Hudson MD(Interpreting  physician) on 06/14/2021 11:36 AM  ----------------------------------------------------------------  M-Mode/2D Measurements & Calculations   LV Diastolic    LV Systolic Dimension: 3 cm AV Cusp Separation: 1.5 cmLA  Dimension: 4.1  LV Volume Diastolic: 17.4   Dimension: 3.9 cmAO Root  cm              ml                          Dimension: 2.7 cm  LV FS:26.8 %    LV Volume Systolic: 34 ml  LV PW           LV EDV/LV EDV Index: 14.8  Diastolic: 1.1  PW/03 TT/I^3CM ESV/LV ESV  cm              Index: 34 ml/21ml/ m^2      RV Diastolic Dimension: 3.9 cm  LV PW Systolic: EF Calculated: 62.9 %  1.3 cm          LV Mass Index: 93 l/min*m^2 LA/Aorta: 1.44  Septum          LV Length: 5.8 cm           Ascending Aorta: 3.2 cm  Diastolic: 1.1                              LA volume/Index: 44 ml  cm              LVOT: 2 cm                  /27ml/m^2  Septum                                      RA Area: 93.6 cm^2  Systolic: 1.3  cm                                          IVC Expiration: 1.1 cm  CO: 3.66 l/min  CI: 2.25  l/m*m^2  LV Mass: 151.3  g  Doppler Measurements & Calculations   MV Peak E-Wave:   AV Peak Velocity: 1.45 m/s     LVOT Peak Velocity: 0.93  0.61 m/s          AV Peak Gradient: 8.35 mmHg    m/s  MV Peak A-Wave:   AV Mean Velocity: 0.98 m/s     LVOT Mean Velocity: 0.7  0.72 m/s          AV Mean Gradient: 4.5 mmHg     m/s  MV E/A Ratio:     AV VTI: 24.4 cm                LVOT Peak Gradient: 3.4  0.84              AV Area (Continuity):2.46 cm^2 mmHgLVOT Mean Gradient:  MV Peak Gradient:                                2.1 mmHg  2.4 mmHg          LVOT VTI: 19.1 cm              Estimated RVSP: 27.1 mmHg  MV Mean Gradient: IVRT: 115.3 msec               Estimated RAP:3 mmHg  1.1 mmHg          Estimated PASP: 27.09 mmHg  MV Mean Velocity: Pulm. Vein A Reversal  0.49 m/s          Duration:87.7 msec             TR Velocity:2.45 m/s  MV Deceleration   Pulm. Vein D Velocity:0.23     TR Gradient:24.09 mmHg  Time: 246 msec    m/sPulm. Vein A Reversal       PV Peak Velocity: 1.21  MV P1/2t: 129.1   Velocity:0.21 m/s              m/s  msec              Pulm.  Vein S Velocity: 0.37    PV Peak Gradient: 5.87  MVA by PHT:1.7    m/s                            mmHg  cm^2                                             PV Mean Velocity: 0.79  MV Area                                          m/s  (continuity): 2.3                                PV Mean Gradient: 2.8  cm^2 mmHg  MV E' Septal  Velocity: 0.05  m/s  MV E' Lateral  Velocity: 7 m/s  [email or web address] [id number]    CT ABDOMEN PELVIS WO CONTRAST Additional Contrast? None    Result Date: 6/13/2021  EXAMINATION: CT OF THE ABDOMEN AND PELVIS WITHOUT CONTRAST 6/13/2021 5:32 pm TECHNIQUE: CT of the abdomen and pelvis was performed without the administration of intravenous contrast. Multiplanar reformatted images are provided for review. Dose modulation, iterative reconstruction, and/or weight based adjustment of the mA/kV was utilized to reduce the radiation dose to as low as reasonably achievable. COMPARISON: None. HISTORY: ORDERING SYSTEM PROVIDED HISTORY: pain, diarrhea TECHNOLOGIST PROVIDED HISTORY: Reason for exam:->pain, diarrhea Additional Contrast?->None Decision Support Exception - unselect if not a suspected or confirmed emergency medical condition->Emergency Medical Condition (MA) FINDINGS: Lower Chest: The lung bases are clear. Organs: Liver within normal limits with atherosclerotic calcifications. The spleen, adrenals, pancreas and gallbladder are within normal limits. Small exophytic lesion in the mid polar region of the left kidney measuring approximately 1.5 cm in diameter, most likely hemorrhagic cyst.  Smaller similar lesion in the inferior pole of the left kidney. Smaller renal cortical cysts also noted. Rounded slightly hyper dense lesion arising from the inferior pole of the left kidney measuring approximately 2.5 cm in diameter, indeterminate and with attenuation in the soft tissue range. Small renal solid mass cannot be excluded. Follow-up recommended. Consider multiphasic CT of the kidneys or follow-up with ultrasound. No obstructive uropathy. GI/Bowel: No evidence of bowel obstruction or free intraperitoneal air. Scattered colonic diverticula with no evidence of diverticulitis. Normal appendix. Pelvis: Coarse uterine calcification likely related to fibroid measuring approximately 1.7 cm in diameter. Urinary bladder within normal limits. No free fluid in the pelvis. Peritoneum/Retroperitoneum: Atherosclerotic calcification along the abdominal aorta with no evidence of aneurysmal dilatation. Bones/Soft Tissues: No acute bony pathology. There is a slightly hyperdense lesion arising from the inferior pole of the left kidney measuring approximately 2.5 cm in diameter with attenuation in the soft tissue range and indeterminate. A small solid renal mass such as renal cell carcinoma cannot be excluded. Follow-up recommended. Consider multiphasic CT of the kidneys or follow-up with ultrasound. Small hemorrhagic cysts also noted in the left kidney. Colonic diverticulosis with no evidence of diverticulitis. Coarse calcification in the uterus likely related to fibroid. CT Head WO Contrast    Result Date: 6/13/2021  EXAMINATION: CT OF THE HEAD WITHOUT CONTRAST  6/13/2021 6:32 pm TECHNIQUE: CT of the head was performed without the administration of intravenous contrast. Dose modulation, iterative reconstruction, and/or weight based adjustment of the mA/kV was utilized to reduce the radiation dose to as low as reasonably achievable. COMPARISON: None. HISTORY: ORDERING SYSTEM PROVIDED HISTORY: ams TECHNOLOGIST PROVIDED HISTORY: Reason for exam:->ams Has a \"code stroke\" or \"stroke alert\" been called? ->No Decision Support Exception - unselect if not a suspected or confirmed emergency medical condition->Emergency Medical Condition (MA) FINDINGS: BRAIN/VENTRICLES: There is no acute intracranial hemorrhage, mass effect or midline shift. No abnormal extra-axial fluid collection. The gray-white differentiation is maintained without evidence of an acute infarct. There is no evidence of hydrocephalus. Periventricular white matter changes consistent chronic microvascular disease.   Diffuse volume loss. ORBITS: The visualized portion of the orbits demonstrate no acute abnormality. SINUSES: The visualized paranasal sinuses and mastoid air cells demonstrate no acute abnormality. SOFT TISSUES/SKULL:  No acute abnormality of the visualized skull or soft tissues. No acute intracranial abnormality. Periventricular white matter changes consistent chronic microvascular disease. Diffuse volume loss. XR CHEST PORTABLE    Result Date: 6/14/2021  EXAMINATION: ONE XRAY VIEW OF THE CHEST 6/14/2021 9:36 am COMPARISON: 04/09/2021 HISTORY: ORDERING SYSTEM PROVIDED HISTORY: sob TECHNOLOGIST PROVIDED HISTORY: Reason for exam:->sob FINDINGS: The lungs are without acute focal process. There is no effusion or pneumothorax. The cardiomediastinal silhouette is without acute process. The osseous structures are without acute process. A dual lead cardiac pacer is noted on the left. No acute process. US RETROPERITONEAL COMPLETE    Result Date: 6/14/2021  EXAMINATION: RETROPERITONEAL ULTRASOUND OF THE KIDNEYS AND URINARY BLADDER 6/14/2021 COMPARISON: CT scan abdomen pelvis 06/13/2021 HISTORY: ORDERING SYSTEM PROVIDED HISTORY: further evaluate left lower pole renal lesion TECHNOLOGIST PROVIDED HISTORY: Reason for exam:->further evaluate left lower pole renal lesion What reading provider will be dictating this exam?->CRC FINDINGS: Kidneys: The right kidney measures 8.4 cm in length and the left kidney measures 9.8 cm in length. 2.1 cm simple cyst inferior pole left kidney. 1.5 cm complex cyst mid zone left kidney. Renal mass cannot be excluded. Kidneys demonstrate normal cortical echogenicity. No evidence of hydronephrosis or intrarenal stones. Bladder: The urinary bladder is not well distended. 1.5 cm complex lesion mid zone left kidney. A renal mass cannot be excluded. Urological consultation recommended. 2.1 cm simple cyst inferior pole left kidney.        MICROBIOLOGY:  BLOOD CX #1  No results for input(s): BC in the last 72 hours. BLOOD CX #2  No results for input(s): Fredi Bennetts in the last 72 hours. TIP CULTURE  No results for input(s): CXCATHTIP in the last 72 hours. CULTURE, RESPIRATORY   No results for input(s): CULTRESP in the last 72 hours. RESPIRATORY SMEAR  No results for input(s): RESPSMEAR in the last 72 hours. Echo 6/14/21   Normal left ventricular systolic function.    Ejection fraction is visually estimated at 55%.    Normal right ventricular size and function (TAPSE 1.9 cm).    There is doppler evidence of stage I diastolic dysfunction.    Mild aortic regurgitation.    Physiologic and/or trace tricuspid regurgitation.    PASP is estimated at 27 mmHg (normal estimated PASP).      DISPOSITION:  The patient's condition is fair.    The patient is being discharged to nursing home    [address] [place] [address]:    [address] [facility] Med[facility] Instructions [facility]:[id number]    Printed on:06/17/21 2946   Medication Information                      albuterol sulfate HFA (VENTOLIN HFA) 108 (90 Base) MCG/ACT inhaler  Inhale 2 puffs into the lungs 4 times daily as needed for Wheezing             Blood Glucose Monitoring Suppl [facility] w/Device KIT  To test blood sugar             blood glucose test strips (ASCENSIA AUTODISC VI;ONE TOUCH ULTRA TEST VI) strip  Test BID             donepezil (ARICEPT) 10 MG tablet  Take 1 tablet by mouth nightly             escitalopram (LEXAPRO) 10 MG tablet  Take 1 tablet by mouth daily             glipiZIDE (GLUCOTROL) 10 MG tablet  Take 1 tablet by mouth 2 times daily (before meals)             Handicap Placard MISC  by Does not apply route             metFORMIN (GLUCOPHAGE) 500 MG tablet  Take 1 tablet by mouth 2 times daily (with meals)             nebivolol (BYSTOLIC) 2.5 MG tablet  Take 3 tablets by mouth daily             ONETOUCH VERIO strip  1 each by Other route daily             pioglitazone (ACTOS) 30 MG tablet  Take 1 tablet by mouth daily potassium chloride (KLOR-CON M) 10 MEQ extended release tablet  Take 1 tablet by mouth 3 times daily 3 tablets by mouth every day                 Discharge Medication List as of 6/16/2021  4:22 PM        Discharge Medication List as of 6/16/2021  4:22 PM      STOP taking these medications       ibuprofen (ADVIL;MOTRIN) 800 MG tablet Comments:   Reason for Stopping:         losartan (COZAAR) 50 MG tablet Comments:   Reason for Stopping:         hydroCHLOROthiazide (MICROZIDE) 12.5 MG capsule Comments:   Reason for Stopping:             Discharge Medication List as of 6/16/2021  4:22 PM          INTERNAL MEDICINE FOLLOW UP/INSTRUCTIONS:  · Follow-up with primary care physician as directed in discharge paperwork. · Please review results of imaging studies with PCP. · Follow-up with consultants as directed by them. · If recurrence or worsening of symptoms go to the ED or call primary care physician. · Diet: ADULT DIET; Regular; Low Fat (less than or equal to 50 gm/day)    Preparing for this patient's discharge, including paperwork, orders, instructions, and meeting with patient did required 34 minutes.     Electronically signed by Jared Bruno DO on 6/17/2021 at 1:04 PM

## 2021-06-15 NOTE — PATIENT CARE CONFERENCE
P Quality Flow/Interdisciplinary Rounds Progress Note        Quality Flow Rounds held on Sona 15, 2021    Disciplines Attending:  Bedside Nurse, ,  and Nursing Unit Sara Fletcher was admitted on 6/13/2021  5:54 PM    Anticipated Discharge Date:  Expected Discharge Date: 06/16/21    Disposition:    Harman Score:  Harman Scale Score: 18    Readmission Risk              Risk of Unplanned Readmission:  15           Discussed patient goal for the day, patient clinical progression, and barriers to discharge.   The following Goal(s) of the Day/Commitment(s) have been identified:  Diagnostics - Report Results      Kim Redmond RN  Sona 15, 2021

## 2021-06-15 NOTE — PROGRESS NOTES
GENERAL SURGERY  DAILY PROGRESS NOTE  6/15/2021  Chief Complaint   Patient presents with    Hypotension      in triage    Hyperglycemia    Altered Mental Status       Subjective:  Continues with no abdominal pain. Tolerated clear liquid diet. Objective:  BP (!) 123/58   Pulse 62   Temp 97.9 °F (36.6 °C) (Oral)   Resp 16   Ht 5' (1.524 m)   Wt 145 lb (65.8 kg)   SpO2 96%   BMI 28.32 kg/m²     GENERAL:  Laying in bed, awake, alert, cooperative, no apparent distress  HEAD: Normocephalic, atraumatic  EYES: No sclera icterus, pupils equal  LUNGS:  No increased work of breathing  CARDIOVASCULAR:  RRR  ABDOMEN:  Soft, non-tender, non-distended  EXTREMITIES: No edema or swelling  SKIN: Warm and dry    Assessment/Plan:  80 y.o. female with elevated blood lipase    Low-fat diet  Unable to perform MRCP or CTA triphasic pancreas. Patient does show evidence of chronic calcific pancreatitis  Continue to trend bili and lipase levels    Electronically signed by Juhi Higgins MD on 6/15/2021 at 11:05 AM      Attending Physician Statement:    Chief Complaint:   Chief Complaint   Patient presents with    Hypotension      in triage    Hyperglycemia    Altered Mental Status       I have examined the patient and performed the key aspects of physical exam, reviewed the record (including all pertinent and new radiology images and laboratory findings), and discussed the case with the surgical team.  I agree with the assessment and plan with the following additions, corrections, and changes. 14pt review of symptoms completed and negative except as mentioned. Tolerate her liquids well. She has no abdominal pain. Her bowels are moving. We will advance her diet today. If tolerates can discharge from surgical standpoint. Jarocho Brasher MD    NOTE: This report, in part or full, may have been transcribed using voice recognition software.  Every effort was made to ensure accuracy; however, inadvertent computerized transcription errors may be present. Please excuse any transcriptional grammatical or spelling errors that may have escaped my editorial review.

## 2021-06-15 NOTE — PROGRESS NOTES
INPATIENT CARDIOLOGY FOLLOW-UP    Name: Barbie Maher    Age: 80 y.o. Date of Admission: 6/13/2021  5:54 PM    Date of Service: 6/15/2021    Chief Complaint: Follow-up for chronic HFpEF    Interim History:  Currently with no chest pain, respiratory distress, or palpitations. AS/ on EKG. No new overnight cardiac complaints. Currently on IV fluids. Review of Systems:   Cardiac: As per HPI  General: No fever, chills  Pulmonary: As per HPI  HEENT: No visual disturbances, difficult swallowing  GI: No nausea, vomiting  : No dysuria, hematuria  Endocrine: No thyroid disease, +DM  Musculoskeletal: JULIAN x 4, no focal motor deficits  Skin: Intact, no rashes  Neuro: No headache, seizures  Psych: Currently with no depression, anxiety    Problem List:  Patient Active Problem List   Diagnosis    Cardiomyopathy, nonischemic ([facility] [facility]    Bundle branch block, left    Asthma    Biventricular implantable cardioverter-defibrillator in situ    CAD (coronary artery disease)    Diabetes 1.5, managed as type 2 ([facility] [facility]    Patient in clinical research study    Insomnia due to medical condition    Late onset Alzheimer's disease with behavioral disturbance ([facility] [facility]    PHOEBE (acute kidney injury) ([facility] [facility]    Failure to thrive in adult    Mild protein-calorie malnutrition ([facility] [facility]       Allergies:   Allergies   Allergen Reactions    Codeine     Statins        Current Medications:  Current Facility-Administered Medications   Medication Dose Route Frequency Provider Last Rate Last Admin    sodium chloride flush 0.9 % injection 10 mL  10 mL Intravenous 2 times per day Salvador Martines, DO   10 mL at 06/14/21 1009    sodium chloride flush 0.9 % injection 10 mL  10 mL Intravenous PRN Salvador Martines, DO   10 mL at 06/14/21 1441    0.9 % sodium chloride infusion  25 mL Intravenous PRN Salvador Brianino, DO        enoxaparin (LOVENOX) injection 30 mg  30 mg Subcutaneous Daily Salvador Martines, DO   30 mg at 06/14/21 1009    ondansetron (ZOFRAN-ODT) disintegrating tablet 4 mg  4 mg Oral Q8H PRN Salvador E Volino, DO        Or    ondansetron (ZOFRAN) injection 4 mg  4 mg Intravenous Q6H PRN Salvador E Volino, DO        senna (SENOKOT) tablet 8.6 mg  1 tablet Oral Daily PRN Salvador E Volino, DO        acetaminophen (TYLENOL) tablet 650 mg  650 mg Oral Q6H PRN Salvador E Volino, DO        Or    acetaminophen (TYLENOL) suppository 650 mg  650 mg Rectal Q6H PRN Salvador E Volino, DO        perflutren lipid microspheres (DEFINITY) injection 1.65 mg  1.5 mL Intravenous ONCE PRN Kermite Radha, APRN - CNP        0.9 % sodium chloride infusion   Intravenous Continuous Salvador E Volino, DO 75 mL/hr at 06/15/21 0705 Rate Verify at 06/15/21 0705    potassium chloride (KLOR-CON M) extended release tablet 10 mEq  10 mEq Oral TID Anusha Soto MD   10 mEq at 06/14/21 2239    [Held by provider] losartan (COZAAR) tablet 50 mg  50 mg Oral Daily Anusha Soto MD        glipiZIDE (GLUCOTROL) tablet 10 mg  10 mg Oral BID AC Anusha Soto MD   10 mg at 06/15/21 0646    pioglitazone (ACTOS) tablet 30 mg  30 mg Oral Daily Anusha Soto MD   30 mg at 06/14/21 1009    donepezil (ARICEPT) tablet 10 mg  10 mg Oral Nightly Anusha Soto MD   10 mg at 06/14/21 2239    escitalopram (LEXAPRO) tablet 10 mg  10 mg Oral Daily Anusha Soto MD   10 mg at 06/14/21 1009    nebivolol (BYSTOLIC) tablet 7.5 mg  7.5 mg Oral Daily Anusha Soto MD   7.5 mg at 06/14/21 1009    insulin lispro (HUMALOG) injection vial 0-12 Units  0-12 Units Subcutaneous TID  Anusha Soto MD        insulin lispro (HUMALOG) injection vial 0-6 Units  0-6 Units Subcutaneous Nightly Anusha Soto MD        glucose (GLUTOSE) 40 % oral gel 15 g  15 g Oral PRN Anusha Soto MD        dextrose 50 % IV solution  12.5 g Intravenous PRN Anusha Soto MD        glucagon (rDNA) injection 1 mg  1 mg Intramuscular PRN Anusha Soto MD        dextrose 5 % solution  100 mL/hr Intravenous PRN Anusha Soto, MD          sodium chloride      sodium chloride 75 mL/hr at 06/15/21 0705    dextrose         Physical Exam:  /60   Pulse 62   Temp 98 °F (36.7 °C) (Tympanic)   Resp 16   Ht 5' (1.524 m)   Wt 145 lb (65.8 kg)   SpO2 96%   BMI 28.32 kg/m²   Wt Readings from Last 3 Encounters:   06/14/21 145 lb (65.8 kg)   03/26/21 157 lb 6.4 oz (71.4 kg)   12/01/20 157 lb (71.2 kg)     Appearance: Awake, alert, no acute respiratory distress  Skin: Intact, no rash  Head: Normocephalic, atraumatic  Eyes: EOMI, no conjunctival erythema  ENMT: No pharyngeal erythema, MMM, no rhinorrhea  Neck: Supple, no carotid bruits  Lungs: Decreased BS B/L, no wheezing  Cardiac: Regular rate and rhythm, +S1S2, no murmurs apparent  Abdomen: Soft, nontender, +bowel sounds  Extremities: Moves all extremities x 4, no lower extremity edema  Neurologic: No focal motor deficits apparent, normal mood and affect    Intake/Output:    Intake/Output Summary (Last 24 hours) at 6/15/2021 0824  Last data filed at 6/15/2021 0705  Gross per 24 hour   Intake 1020 ml   Output --   Net 1020 ml     I/O this shift:   In: 900 [I.V.:900]  Out: -     Laboratory Tests:  Recent Labs     06/13/21  1844 06/14/21  0330 06/15/21  0440   * 132 131*   K 4.2 4.3 4.3   CL 95* 105 106   CO2 20* 17* 18*   BUN 46* 42* 30*   CREATININE 1.9* 1.6* 1.4*   GLUCOSE 159* 87 103*   CALCIUM 10.2 9.3 9.4     Lab Results   Component Value Date    MG 2.0 11/21/2010     Recent Labs     06/13/21 1844 06/14/21  0330 06/15/21  0440   ALKPHOS 71 54 51   ALT 12 6 9   AST 21 21 17   PROT 7.1 6.2* 6.0*   BILITOT 0.3 0.3 0.3   LABALBU 4.2 3.5 3.5     Recent Labs     06/13/21  1844 06/15/21  0440   WBC 5.6 5.5   RBC 3.87 3.42*   HGB 12.3 10.7*   HCT 36.2 32.4*   MCV 93.5 94.7   MCH 31.8 31.3   MCHC 34.0 33.0   RDW 13.4 13.4    201   MPV 10.0 9.7     Lab Results   Component Value Date    CKTOTAL 58 11/19/2010    CKMB 3.7 11/19/2010    TROPONINI <0.01 04/09/2021    TROPONINI 0.10 11/19/2010    TROPONINI 0.10 11/18/2010     Lab Results   Component Value Date    INR 1.2 08/24/2011    INR 1.1 11/23/2010    PROTIME 11.5 08/24/2011    PROTIME 11.8 11/23/2010     Lab Results   Component Value Date    TSH 2.290 05/12/2020     Lab Results   Component Value Date    LABA1C 7.9 (H) 06/13/2021     No results found for: EAG  Lab Results   Component Value Date    CHOL 221 (H) 05/12/2020    CHOL 224 (H) 05/07/2019    CHOL 240 (H) 05/09/2017     Lab Results   Component Value Date    TRIG 414 (H) 05/12/2020    TRIG 267 (H) 05/07/2019    TRIG 175 (H) 05/09/2017     Lab Results   Component Value Date    HDL 35 05/12/2020    HDL 40 05/07/2019    HDL 44 05/09/2017     Lab Results   Component Value Date    LDLCALC - (AA) 05/12/2020    LDLCALC 131 (H) 05/07/2019    LDLCALC 161 (H) 05/09/2017     Lab Results   Component Value Date    LABVLDL - (AA) 05/12/2020    LABVLDL 53 05/07/2019    LABVLDL 35 05/09/2017     No results found for: CHOLHDLRATIO  Recent Labs     06/13/21  1844   PROBNP 1,336*       Cardiac Tests:  EKG reviewed (EKG date: 6/13/21): AS/, rate 62     Telemetry: not on telemetry    Echocardiogram reviewed: 6/14/21   Normal left ventricular systolic function.   Ejection fraction is visually estimated at 55%.   Normal right ventricular size and function (TAPSE 1.9 cm).   There is doppler evidence of stage I diastolic dysfunction.   Mild aortic regurgitation.   Physiologic and/or trace tricuspid regurgitation.   PASP is estimated at 27 mmHg (normal estimated PASP). ASSESSMENT / PLAN:  1. Admitted for worsening AMS, hypoglycemia and hypotension  2. Chronic HF with improved EF: LVEF 25% on TTE 6/2011---> LVEF 60% 2017 --> 55% this admission. 3. Hypovolemic -- improved  4. NICMP s/p CRT-D   5. Cardiac cath (2010): showing minimal CAD. 6. cLBBB  7. Acute on CKD: Baseline ~1.3-1.5. Improving with IVF (Cr 1.9 --> 1.6 --> 1.4). 8. Hypotension: improving. Hx of HTN. 9. HLD  10. T2DM  11. GERD  12.

## 2021-06-15 NOTE — PROGRESS NOTES
Internal Medicine Progress Note    Patient's name: Santana Wen  : 1932  Chief complaints (on day of admission): Hypotension ( in triage), Hyperglycemia, and Altered Mental Status  Admission date: 2021  Date of service: 6/15/2021   Room: [address] SURG  Primary care physician: Zeenat Medina MD  Reason for visit: Follow-up for failure to thrive    [address]cca[address] was seen and examined. She was resting in her room. She was not oriented to anything but person. She denied complaints or issues though. She told me that she was feeling well. Review of Systems  There are no new complaints of chest pain, shortness of breath, abdominal pain, nausea, vomiting, diarrhea, constipation.     Hospital Medications  Current Facility-Administered Medications   Medication Dose Route Frequency Provider Last Rate Last Admin    sodium chloride flush 0.9 % injection 10 mL  10 mL Intravenous 2 times per day Salvador HUMAIRA Brianino, DO   10 mL at 21 1009    sodium chloride flush 0.9 % injection 10 mL  10 mL Intravenous PRN Salvador Brianino, DO   10 mL at 21 1441    0.9 % sodium chloride infusion  25 mL Intravenous PRN Salvador Brianino, DO        enoxaparin (LOVENOX) injection 30 mg  30 mg Subcutaneous Daily Salvador Brianino, DO   30 mg at 06/15/21 0900    ondansetron (ZOFRAN-ODT) disintegrating tablet 4 mg  4 mg Oral Q8H PRN Salvador Brianino, DO        Or    ondansetron (ZOFRAN) injection 4 mg  4 mg Intravenous Q6H PRN Salvador Brianino, DO        senna (SENOKOT) tablet 8.6 mg  1 tablet Oral Daily PRN Salvador GERARDO Volino, DO        acetaminophen (TYLENOL) tablet 650 mg  650 mg Oral Q6H PRN Salvador Brianino, DO        Or    acetaminophen (TYLENOL) suppository 650 mg  650 mg Rectal Q6H PRN Salvador Brianino, DO        perflutren lipid microspheres (DEFINITY) injection 1.65 mg  1.5 mL Intravenous ONCE PRN LUCÍA Grier - CNP        0.9 % sodium chloride infusion   Intravenous Continuous Salvador HUMAIRA Brianino, DO 75 mL/hr at 06/15/21 0705 Rate Verify at 06/15/21 0705    potassium chloride (KLOR-CON M) extended release tablet 10 mEq  10 mEq Oral TID Cora Lucero MD   10 mEq at 06/15/21 0900    [Held by provider] losartan (COZAAR) tablet 50 mg  50 mg Oral Daily Cora Lucero MD        glipiZIDE (GLUCOTROL) tablet 10 mg  10 mg Oral BID AC Cora Lucero MD   10 mg at 06/15/21 0646    pioglitazone (ACTOS) tablet 30 mg  30 mg Oral Daily Cora Lucero MD   30 mg at 06/15/21 0900    donepezil (ARICEPT) tablet 10 mg  10 mg Oral Nightly Cora Lucero MD   10 mg at 06/14/21 2239    escitalopram (LEXAPRO) tablet 10 mg  10 mg Oral Daily Cora Lucero MD   10 mg at 06/15/21 0900    nebivolol (BYSTOLIC) tablet 7.5 mg  7.5 mg Oral Daily Cora Lucero MD   7.5 mg at 06/15/21 0900    insulin lispro (HUMALOG) injection vial 0-12 Units  0-12 Units Subcutaneous TID WC Cora Lucero MD        insulin lispro (HUMALOG) injection vial 0-6 Units  0-6 Units Subcutaneous Nightly Cora Lucero MD        glucose (GLUTOSE) 40 % oral gel 15 g  15 g Oral PRN Cora Lucero MD        dextrose 50 % IV solution  12.5 g Intravenous PRN Cora Lucero MD        glucagon (rDNA) injection 1 mg  1 mg Intramuscular PRN Cora Lucero MD        dextrose 5 % solution  100 mL/hr Intravenous PRN Cora Lucero MD           PRN Medications  sodium chloride flush, sodium chloride, ondansetron **OR** ondansetron, senna, acetaminophen **OR** acetaminophen, perflutren lipid microspheres, glucose, dextrose, glucagon (rDNA), dextrose    Objective  Most Recent Recorded Vitals  BP (!) 123/58   Pulse 62   Temp 97.9 °F (36.6 °C) (Oral)   Resp 16   Ht 5' (1.524 m)   Wt 145 lb (65.8 kg)   SpO2 96%   BMI 28.32 kg/m²   I/O last 3 completed shifts: In: 120 [P.O.:120]  Out: -   I/O this shift:  In: 1020 [P.O.:120;  I.V.:900]  Out: -     Physical Exam:  General: AAO to person only, NAD, no labored breathing  Eyes: conjunctivae/corneas clear, sclera non icteric  Skin: color/texture/turgor normal, no rashes or lesions  Lungs: CTAB, no retractions/use of accessory muscles, no vocal fremitus, no rhonchi, no crackle, no rales  Heart: regular rate, regular rhythm, no murmur  Abdomen: soft, NT, bowel sounds normal  Extremities: atraumatic, no edema  Neurologic: cranial nerves 2-12 grossly intact, no slurred speech    Most Recent Labs  Lab Results   Component Value Date    WBC 5.5 06/15/2021    HGB 10.7 (L) 06/15/2021    HCT 32.4 (L) 06/15/2021     06/15/2021     (L) 06/15/2021    K 4.3 06/15/2021     06/15/2021    CREATININE 1.4 (H) 06/15/2021    BUN 30 (H) 06/15/2021    CO2 18 (L) 06/15/2021    GLUCOSE 103 (H) 06/15/2021    ALT 9 06/15/2021    AST 17 06/15/2021    INR 1.2 08/24/2011    TSH 2.290 05/12/2020    LABA1C 7.9 (H) 06/13/2021    LABMICR <12.0 05/07/2019       US RETROPERITONEAL COMPLETE   Final Result   1.5 cm complex lesion mid zone left kidney. A renal mass cannot be excluded. Urological consultation recommended. 2.1 cm simple cyst inferior pole left kidney. XR CHEST PORTABLE   Final Result   No acute process. CT Head WO Contrast   Final Result   No acute intracranial abnormality. Periventricular white matter changes consistent chronic microvascular   disease. Diffuse volume loss. CT ABDOMEN PELVIS WO CONTRAST Additional Contrast? None   Final Result   There is a slightly hyperdense lesion arising from the inferior pole of the   left kidney measuring approximately 2.5 cm in diameter with attenuation in   the soft tissue range and indeterminate. A small solid renal mass such as   renal cell carcinoma cannot be excluded. Follow-up recommended. Consider   multiphasic CT of the kidneys or follow-up with ultrasound. Small   hemorrhagic cysts also noted in the left kidney. Colonic diverticulosis with no evidence of diverticulitis. Coarse calcification in the uterus likely related to fibroid. Echo 6/14/21   Normal left ventricular systolic function.    Ejection fraction is visually estimated at 55%.    Normal right ventricular size and function (TAPSE 1.9 cm).    There is doppler evidence of stage I diastolic dysfunction.    Mild aortic regurgitation.    Physiologic and/or trace tricuspid regurgitation.    PASP is estimated at 27 mmHg (normal estimated PASP). Assessment   Active Hospital Problems    Diagnosis     Failure to thrive in adult [R62.7]      Priority: High    PHOEBE (acute kidney injury) ([facility] [address] [N17.9]      Priority: Medium    Mild protein-calorie malnutrition (HCC) [E44.1]     Late onset Alzheimer's disease with behavioral disturbance (HCC) [G30.1, F02.81]     Insomnia due to medical condition [G47.01]     Diabetes 1.5, managed as type 2 ([facility] [address] [E13.9]     CAD (coronary artery disease) [I25.10]     Cardiomyopathy, nonischemic ([facility] [address] [I42.8]          Plan  · Adult failure to thrive with associated nausea, unlikely pancreatitis:  Abdominal exam benign. Lipase elevated. General surgery following-case discussed on 6/15. Unable to get MRCP due to ICD. Tolerating general diet. DC IVF hydration. · CHF: Echo noted. Cardiology following. Follow I&O's. IV diuresis discontinued. · Hyponatremia, resolved: Follow BMP. Hold hydrochlorothiazide. · DM, controlled: Continue glipizide and Actos. Restart Metformin. SSI. HbA1c 7.9%. · Hyperdense lesion left kidney: Urology following. Renal US noted. · Follow labs  · DVT prophylaxis. · Please see orders for further management and care. ·  for discharge planning  · Discharge plan: SNF when precertification is obtained    Electronically signed by Karely Canas DO on 6/15/2021 at 1:11 PM    I can be reached through [employer].

## 2021-06-16 VITALS
TEMPERATURE: 98 F | BODY MASS INDEX: 30.23 KG/M2 | DIASTOLIC BLOOD PRESSURE: 54 MMHG | RESPIRATION RATE: 16 BRPM | OXYGEN SATURATION: 98 % | WEIGHT: 154 LBS | HEIGHT: 60 IN | HEART RATE: 63 BPM | SYSTOLIC BLOOD PRESSURE: 125 MMHG

## 2021-06-16 LAB
ALBUMIN SERPL-MCNC: 3.3 G/DL (ref 3.5–5.2)
ALP BLD-CCNC: 53 U/L (ref 35–104)
ALT SERPL-CCNC: 9 U/L (ref 0–32)
ANION GAP SERPL CALCULATED.3IONS-SCNC: 7 MMOL/L (ref 7–16)
AST SERPL-CCNC: 18 U/L (ref 0–31)
BASOPHILS ABSOLUTE: 0.03 E9/L (ref 0–0.2)
BASOPHILS RELATIVE PERCENT: 0.5 % (ref 0–2)
BILIRUB SERPL-MCNC: 0.3 MG/DL (ref 0–1.2)
BUN BLDV-MCNC: 23 MG/DL (ref 6–23)
CALCIUM SERPL-MCNC: 9 MG/DL (ref 8.6–10.2)
CHLORIDE BLD-SCNC: 107 MMOL/L (ref 98–107)
CO2: 17 MMOL/L (ref 22–29)
CREAT SERPL-MCNC: 1.3 MG/DL (ref 0.5–1)
EOSINOPHILS ABSOLUTE: 0.09 E9/L (ref 0.05–0.5)
EOSINOPHILS RELATIVE PERCENT: 1.6 % (ref 0–6)
GFR AFRICAN AMERICAN: 47
GFR NON-AFRICAN AMERICAN: 39 ML/MIN/1.73
GLUCOSE BLD-MCNC: 96 MG/DL (ref 74–99)
HCT VFR BLD CALC: 30.8 % (ref 34–48)
HEMOGLOBIN: 10 G/DL (ref 11.5–15.5)
IMMATURE GRANULOCYTES #: 0.01 E9/L
IMMATURE GRANULOCYTES %: 0.2 % (ref 0–5)
LIPASE: 287 U/L (ref 13–60)
LYMPHOCYTES ABSOLUTE: 2.31 E9/L (ref 1.5–4)
LYMPHOCYTES RELATIVE PERCENT: 41.3 % (ref 20–42)
MCH RBC QN AUTO: 31 PG (ref 26–35)
MCHC RBC AUTO-ENTMCNC: 32.5 % (ref 32–34.5)
MCV RBC AUTO: 95.4 FL (ref 80–99.9)
METER GLUCOSE: 112 MG/DL (ref 74–99)
METER GLUCOSE: 113 MG/DL (ref 74–99)
METER GLUCOSE: 184 MG/DL (ref 74–99)
METER GLUCOSE: 71 MG/DL (ref 74–99)
METER GLUCOSE: 91 MG/DL (ref 74–99)
MONOCYTES ABSOLUTE: 0.5 E9/L (ref 0.1–0.95)
MONOCYTES RELATIVE PERCENT: 8.9 % (ref 2–12)
NEUTROPHILS ABSOLUTE: 2.65 E9/L (ref 1.8–7.3)
NEUTROPHILS RELATIVE PERCENT: 47.5 % (ref 43–80)
PDW BLD-RTO: 13.5 FL (ref 11.5–15)
PLATELET # BLD: 201 E9/L (ref 130–450)
PMV BLD AUTO: 9.8 FL (ref 7–12)
POTASSIUM REFLEX MAGNESIUM: 4.4 MMOL/L (ref 3.5–5)
RBC # BLD: 3.23 E12/L (ref 3.5–5.5)
SODIUM BLD-SCNC: 131 MMOL/L (ref 132–146)
TOTAL PROTEIN: 5.7 G/DL (ref 6.4–8.3)
WBC # BLD: 5.6 E9/L (ref 4.5–11.5)

## 2021-06-16 PROCEDURE — 85025 COMPLETE CBC W/AUTO DIFF WBC: CPT

## 2021-06-16 PROCEDURE — 6370000000 HC RX 637 (ALT 250 FOR IP): Performed by: INTERNAL MEDICINE

## 2021-06-16 PROCEDURE — 36415 COLL VENOUS BLD VENIPUNCTURE: CPT

## 2021-06-16 PROCEDURE — 6360000002 HC RX W HCPCS: Performed by: INTERNAL MEDICINE

## 2021-06-16 PROCEDURE — 80053 COMPREHEN METABOLIC PANEL: CPT

## 2021-06-16 PROCEDURE — 2580000003 HC RX 258: Performed by: INTERNAL MEDICINE

## 2021-06-16 PROCEDURE — 83690 ASSAY OF LIPASE: CPT

## 2021-06-16 PROCEDURE — 99232 SBSQ HOSP IP/OBS MODERATE 35: CPT | Performed by: INTERNAL MEDICINE

## 2021-06-16 PROCEDURE — 82962 GLUCOSE BLOOD TEST: CPT

## 2021-06-16 RX ADMIN — POTASSIUM CHLORIDE 10 MEQ: 750 TABLET, EXTENDED RELEASE ORAL at 14:20

## 2021-06-16 RX ADMIN — SODIUM CHLORIDE: 9 INJECTION, SOLUTION INTRAVENOUS at 03:52

## 2021-06-16 RX ADMIN — GLIPIZIDE 10 MG: 5 TABLET ORAL at 06:45

## 2021-06-16 RX ADMIN — ESCITALOPRAM OXALATE 10 MG: 10 TABLET ORAL at 08:47

## 2021-06-16 RX ADMIN — NEBIVOLOL HYDROCHLORIDE 7.5 MG: 5 TABLET ORAL at 08:44

## 2021-06-16 RX ADMIN — PIOGLITAZONE 30 MG: 15 TABLET ORAL at 08:45

## 2021-06-16 RX ADMIN — ENOXAPARIN SODIUM 30 MG: 30 INJECTION SUBCUTANEOUS at 08:44

## 2021-06-16 RX ADMIN — INSULIN LISPRO 2 UNITS: 100 INJECTION, SOLUTION INTRAVENOUS; SUBCUTANEOUS at 11:13

## 2021-06-16 RX ADMIN — POTASSIUM CHLORIDE 10 MEQ: 750 TABLET, EXTENDED RELEASE ORAL at 08:44

## 2021-06-16 RX ADMIN — GLIPIZIDE 10 MG: 5 TABLET ORAL at 16:10

## 2021-06-16 ASSESSMENT — PAIN SCALES - GENERAL
PAINLEVEL_OUTOF10: 0

## 2021-06-16 NOTE — PROGRESS NOTES
[x]?Positioning to improve functional independence        Recommended Adaptive Equipment: TBD      Patient poor historian:   Home Living: Pt lives alone, ( recently passed away)  - unsure why she is hospital or how she got here  Therapist spoke with patients sister on phone - she reports patient lives alone but has been staying with her lately. She uses a walker to her around sometimes in the house      Pain Level: no pain this session ;   Cognition: Alert and conversing with cues for safety provided                Functional Assessment:  AM-PAC Daily Activity Raw Score: 16/24    Initial Eval Status  Date: 6/14/21 Treatment Status  Date:6/16/21 STGs = LTGs  Time frame: 10-14 days   Feeding NPO   Independent    Grooming SBA/set-up   Standing at sink washing hands    Mod I    UB Dressing Min A   Mod i   LB Dressing Min A   Supervision    Bathing Min a   Supervision    Toileting Supervision    Independent    Bed Mobility  SBA  Supine <>sit   Independent    Functional Transfers Min A  Sit-stand from bed, commode    Mod I    Functional Mobility Min A, w/walker   Ambulated to/from bathoom   Supervision  with good tolerance    Balance Sitting:     Static:  Supervision- EOB     Dynamic:Min A  Standing: CGA/SBA    Independent/supervision    Activity Tolerance Fair with light activity    Goo=od  with ADL activity    Visual/  Perceptual Glasses: none by bedside                            B UE were *** during tasks. Treatment: Upon arrival pt ***. At end of session *** alarm on, all lines and tubes intact and call light within reach. Education: ***    · Pt has made *** progress towards set goals.      ***    Treatment Charges: Mins Units   Ther Ex  [id number]     Manual Therapy [id number]     Thera Activities [id number]     ADL/Home Mgt [id number]     Neuro Re-ed [id number]     Group Therapy      Orthotic manage/training  [id number]     Non-Billable Time       Time In: ***  Time Out: ***  Total Time: ***    Steff READ/JAKE [id number]

## 2021-06-16 NOTE — PATIENT CARE CONFERENCE
P Quality Flow/Interdisciplinary Rounds Progress Note        Quality Flow Rounds held on June 16, 2021    Disciplines Attending:  Bedside Nurse, ,  and Nursing Unit Sara Gonzalez was admitted on 6/13/2021  5:54 PM    Anticipated Discharge Date:  Expected Discharge Date: 06/16/21    Disposition:    Harman Score:  Harman Scale Score: 16    Readmission Risk              Risk of Unplanned Readmission:  18           Discussed patient goal for the day, patient clinical progression, and barriers to discharge.   The following Goal(s) of the Day/Commitment(s) have been identified:  Labs - Report Results      Montserrat Melchor RN  June 16, 2021

## 2021-06-16 NOTE — PROGRESS NOTES
Internal Medicine Progress Note    Patient's name: Jesse Barlow  : 1932  Chief complaints (on day of admission): Hypotension ( in triage), Hyperglycemia, and Altered Mental Status  Admission date: 2021  Date of service: 2021   Room: [address] MED SURG  Primary care physician: Bala Rosas MD  Reason for visit: Follow-up for failure to thrive    1700 Susan Mccauley was seen and examined while lying in bed. Continues to be confused today. Reports she is in a hospital in [place], is unsure of the year, and still believes she lives with her  who passed away in March. Currently she is denying any pain or discomfort as well as nausea/vomiting. Preliminary urine culture with less than 10,000 gram-negative craig. Lipase slightly elevated from yesterday at 287(6/15 173). Remains on a low-fat diet and approximately 50 to 75% of meal yesterday afternoon. Remains afebrile. Review of Systems  There are no new complaints of chest pain, shortness of breath, abdominal pain, nausea, vomiting, diarrhea, constipation.     Hospital Medications  Current Facility-Administered Medications   Medication Dose Route Frequency Provider Last Rate Last Admin    sodium chloride flush 0.9 % injection 10 mL  10 mL Intravenous 2 times per day Salvador HUMAIRA Volino, DO   10 mL at 21 1009    sodium chloride flush 0.9 % injection 10 mL  10 mL Intravenous PRN Salvador Brianino, DO   10 mL at 21 1441    0.9 % sodium chloride infusion  25 mL Intravenous PRN Salvador Brianino, DO        enoxaparin (LOVENOX) injection 30 mg  30 mg Subcutaneous Daily Salvador Brianino, DO   30 mg at 06/15/21 0900    ondansetron (ZOFRAN-ODT) disintegrating tablet 4 mg  4 mg Oral Q8H PRN Salvador Martines, DO        Or    ondansetron (ZOFRAN) injection 4 mg  4 mg Intravenous Q6H PRN Salvador Brianino, DO        senna (SENOKOT) tablet 8.6 mg  1 tablet Oral Daily PRN Salvador Martines, DO        acetaminophen (TYLENOL) tablet 650 mg  650 mg Oral Q6H PRN Salvador Martines, DO        Or    acetaminophen (TYLENOL) suppository 650 mg  650 mg Rectal Q6H PRN Salvador E Volino, DO        perflutren lipid microspheres (DEFINITY) injection 1.65 mg  1.5 mL Intravenous ONCE PRN Ace Furl, APRN - CNP        0.9 % sodium chloride infusion   Intravenous Continuous Salvador Martines, DO 75 mL/hr at 06/16/21 0352 New Bag at 06/16/21 0352    potassium chloride (KLOR-CON M) extended release tablet 10 mEq  10 mEq Oral TID Akil Souza MD   10 mEq at 06/15/21 2231    [Held by provider] losartan (COZAAR) tablet 50 mg  50 mg Oral Daily Akil Souza MD        glipiZIDE (GLUCOTROL) tablet 10 mg  10 mg Oral BID AC Akil Souza MD   10 mg at 06/15/21 1556    pioglitazone (ACTOS) tablet 30 mg  30 mg Oral Daily Akil Souza MD   30 mg at 06/15/21 0900    donepezil (ARICEPT) tablet 10 mg  10 mg Oral Nightly Akil Souza MD   10 mg at 06/15/21 2231    escitalopram (LEXAPRO) tablet 10 mg  10 mg Oral Daily Akil Souza MD   10 mg at 06/15/21 0900    nebivolol (BYSTOLIC) tablet 7.5 mg  7.5 mg Oral Daily Akil Souza MD   7.5 mg at 06/15/21 0900    insulin lispro (HUMALOG) injection vial 0-12 Units  0-12 Units Subcutaneous TID  Akil Souza MD        insulin lispro (HUMALOG) injection vial 0-6 Units  0-6 Units Subcutaneous Nightly Akil Souza MD        glucose (GLUTOSE) 40 % oral gel 15 g  15 g Oral PRN Akil Souza MD        dextrose 50 % IV solution  12.5 g Intravenous PRN Akil Souza MD        glucagon (rDNA) injection 1 mg  1 mg Intramuscular PRN Akil Souza MD        dextrose 5 % solution  100 mL/hr Intravenous PRN Akil Souza MD           PRN Medications  sodium chloride flush, sodium chloride, ondansetron **OR** ondansetron, senna, acetaminophen **OR** acetaminophen, perflutren lipid microspheres, glucose, dextrose, glucagon (rDNA), dextrose    Objective  Most Recent Recorded Vitals  BP (!) 111/54   Pulse 67   Temp 98.6 °F (37 °C) (Oral)   Resp 16   Ht 5' (1.524 m)   Wt 154 lb (69.9 kg)   SpO2 98%   BMI 30.08 kg/m²   I/O last 3 completed shifts: In: 2131 [P.O.:420; I.V.:1711]  Out: -   I/O this shift:  In: -   Out: 350 [Urine:350]    Physical Exam:  General: AAO to person only, NAD, no labored breathing  Eyes: conjunctivae/corneas clear, sclera non icteric  Skin: color/texture/turgor normal, no rashes or lesions  Lungs: CTAB, no retractions/use of accessory muscles, no vocal fremitus, no rhonchi, no crackle, no rales  Heart: regular rate, regular rhythm, no murmur  Abdomen: soft, NT, bowel sounds normal  Extremities: atraumatic, no edema  Neurologic: cranial nerves 2-12 grossly intact, no slurred speech    Most Recent Labs  Lab Results   Component Value Date    WBC 5.6 06/16/2021    HGB 10.0 (L) 06/16/2021    HCT 30.8 (L) 06/16/2021     06/16/2021     (L) 06/16/2021    K 4.4 06/16/2021     06/16/2021    CREATININE 1.3 (H) 06/16/2021    BUN 23 06/16/2021    CO2 17 (L) 06/16/2021    GLUCOSE 96 06/16/2021    ALT 9 06/16/2021    AST 18 06/16/2021    INR 1.2 08/24/2011    TSH 2.290 05/12/2020    LABA1C 7.9 (H) 06/13/2021    LABMICR <12.0 05/07/2019     Urine Culture, Routine 06/13/2021  6:44 PM  - [facility]   <10,000 CFU/mL   Gram negative rods   <10,000 CFU/mL   Gram negative rods        US RETROPERITONEAL COMPLETE   Final Result   1.5 cm complex lesion mid zone left kidney. A renal mass cannot be excluded. Urological consultation recommended. 2.1 cm simple cyst inferior pole left kidney. XR CHEST PORTABLE   Final Result   No acute process. CT Head WO Contrast   Final Result   No acute intracranial abnormality. Periventricular white matter changes consistent chronic microvascular   disease. Diffuse volume loss.          CT ABDOMEN PELVIS WO CONTRAST Additional Contrast? None   Final Result   There is a slightly hyperdense lesion arising from the inferior pole of the   left kidney measuring approximately 2.5 cm in diameter with attenuation in   the soft tissue range and indeterminate. A small solid renal mass such as   renal cell carcinoma cannot be excluded. Follow-up recommended. Consider   multiphasic CT of the kidneys or follow-up with ultrasound. Small   hemorrhagic cysts also noted in the left kidney. Colonic diverticulosis with no evidence of diverticulitis. Coarse calcification in the uterus likely related to fibroid. Echo 6/14/21   Normal left ventricular systolic function.    Ejection fraction is visually estimated at 55%.    Normal right ventricular size and function (TAPSE 1.9 cm).    There is doppler evidence of stage I diastolic dysfunction.    Mild aortic regurgitation.    Physiologic and/or trace tricuspid regurgitation.    PASP is estimated at 27 mmHg (normal estimated PASP). Assessment   Active Hospital Problems    Diagnosis     Failure to thrive in adult [R62.7]      Priority: High    PHOEBE (acute kidney injury) ([facility] [address] [N17.9]      Priority: Medium    Mild protein-calorie malnutrition (HCC) [E44.1]     Late onset Alzheimer's disease with behavioral disturbance (HCC) [G30.1, F02.81]     Insomnia due to medical condition [G47.01]     Diabetes 1.5, managed as type 2 ([address] [E13.9]     CAD (coronary artery disease) [I25.10]     Cardiomyopathy, nonischemic ([address] [I42.8]          Plan  · Adult failure to thrive with associated nausea, unlikely pancreatitis:  Abdominal exam benign.  Lipase elevated. General surgery following-case discussed on 6/15. Unable to get MRCP due to ICD. Tolerating general diet. DC IVF hydration. · CHF: Echo noted. Cardiology following. Follow I&O's. IV diuresis discontinued, losartan and hydrochlorothiazide on hold. · Hyponatremia, resolved: Follow BMP.  Hold hydrochlorothiazide. · DM, controlled: Continue glipizide, Actos,  Metformin. SSI. HbA1c 7.9%. · Hyperdense lesion left kidney: Urology following.  Renal US noted. · PT AM-PAC 16/24  · Follow labs  · DVT prophylaxis. · Please see orders for further management and care. ·  for discharge planning  · Discharge plan: SNF when precertification is obtained    Electronically signed by CANDIDA Pittman on 6/16/2021 at 6:09 AM    I can be reached through [employer]. Addendum: I have personally participated in a face-to-face history and physical exam on the date of service with the patient. I have discussed the case with the nurse practitioner. I also participated in medical decision making on the date of service and I agree with all of the pertinent clinical information unless indicated in my editing of the note. I have reviewed and edited the note above based on my findings during my history, exam, and decision making on the same day of service. My additional thoughts:    RYAN once pre-CERT is obtained   DC IVF hydration   Likely indefinitely hold Cozaar    Electronically signed by Lang Espinoza DO on 6/16/2021 at 12:25 PM    I can be reached through [employer].

## 2021-06-16 NOTE — CARE COORDINATION
Social Work / Discharge Planning : Jonathan Modi from [facility] updated SW that they received pre-cert . They will set up transport with [facility] for 6. SW called and updated patient emely barahona along with patient RN. SW to follow.  Electronically signed by EVANS Morejon on 6/16/21 at 4:02 PM EDT

## 2021-06-16 NOTE — PATIENT CARE CONFERENCE
[facility] Quality Flow/Interdisciplinary Rounds Progress Note        Quality Flow Rounds held on June 16, 2021    Disciplines Attending:  Bedside Nurse, ,  and Nursing Unit Sara Zee was admitted on 6/13/2021  5:54 PM    Anticipated Discharge Date:  Expected Discharge Date: 06/16/21    Disposition:    Harman Score:  Harman Scale Score: 16    Readmission Risk              Risk of Unplanned Readmission:  18           Discussed patient goal for the day, patient clinical progression, and barriers to discharge.   The following Goal(s) of the Day/Commitment(s) have been identified:  Labs - Report Results      Jeri Gloria RN  June 16, 2021

## 2021-06-16 NOTE — PROGRESS NOTES
INPATIENT CARDIOLOGY FOLLOW-UP    Name: Lyla Cobb    Age: 80 y.o. Date of Admission: 6/13/2021  5:54 PM    Date of Service: 6/16/2021    Chief Complaint: Follow-up for chronic HFpEF    Interim History:  Currently with no chest pain, respiratory distress, or palpitations. AS/ on EKG. No new overnight cardiac complaints. Currently on IV fluids. Review of Systems:   Cardiac: As per HPI  General: No fever, chills  Pulmonary: As per HPI  HEENT: No visual disturbances, difficult swallowing  GI: No nausea, vomiting  : No dysuria, hematuria  Endocrine: No thyroid disease, +DM  Musculoskeletal: JULIAN x 4, no focal motor deficits  Skin: Intact, no rashes  Neuro: No headache, seizures  Psych: Currently with no depression, anxiety    Problem List:  Patient Active Problem List   Diagnosis    Cardiomyopathy, nonischemic ([facility] [address]    Bundle branch block, left    Asthma    Biventricular implantable cardioverter-defibrillator in situ    CAD (coronary artery disease)    Diabetes 1.5, managed as type 2 ([facility] [address]    Patient in clinical research study    Insomnia due to medical condition    Late onset Alzheimer's disease with behavioral disturbance ([facility] [address]    PHOEBE (acute kidney injury) ([facility] [address]    Failure to thrive in adult    Mild protein-calorie malnutrition ([facility] [address]       Allergies:   Allergies   Allergen Reactions    Codeine     Statins        Current Medications:  Current Facility-Administered Medications   Medication Dose Route Frequency Provider Last Rate Last Admin    sodium chloride flush 0.9 % injection 10 mL  10 mL Intravenous 2 times per day Salvador Martines, DO   10 mL at 06/14/21 1009    sodium chloride flush 0.9 % injection 10 mL  10 mL Intravenous PRN Salvador Martines, DO   10 mL at 06/14/21 1441    0.9 % sodium chloride infusion  25 mL Intravenous PRN Salvador Brianino, DO        enoxaparin (LOVENOX) injection 30 mg  30 mg Subcutaneous Daily Salvador Martines, DO   30 mg at 06/16/21 0844    ondansetron (ZOFRAN-ODT) disintegrating tablet 4 mg  4 mg Oral Q8H PRN Salvador E Volino, DO        Or    ondansetron (ZOFRAN) injection 4 mg  4 mg Intravenous Q6H PRN Salvador E Volino, DO        senna (SENOKOT) tablet 8.6 mg  1 tablet Oral Daily PRN Salvador E Volino, DO        acetaminophen (TYLENOL) tablet 650 mg  650 mg Oral Q6H PRN Salvador E Volino, DO        Or    acetaminophen (TYLENOL) suppository 650 mg  650 mg Rectal Q6H PRN Salvador E Volino, DO        perflutren lipid microspheres (DEFINITY) injection 1.65 mg  1.5 mL Intravenous ONCE PRN Petrona Ridsylvia, APRN - CNP        0.9 % sodium chloride infusion   Intravenous Continuous Salvador E Volino, DO 75 mL/hr at 06/16/21 0352 New Bag at 06/16/21 0352    potassium chloride (KLOR-CON M) extended release tablet 10 mEq  10 mEq Oral TID Dima Mejia MD   10 mEq at 06/16/21 0844    [Held by provider] losartan (COZAAR) tablet 50 mg  50 mg Oral Daily Dima Mejia MD        glipiZIDE (GLUCOTROL) tablet 10 mg  10 mg Oral BID AC Dima Mejia MD   10 mg at 06/16/21 0645    pioglitazone (ACTOS) tablet 30 mg  30 mg Oral Daily Dima Mejia MD   30 mg at 06/16/21 0845    donepezil (ARICEPT) tablet 10 mg  10 mg Oral Nightly Dima Mejia MD   10 mg at 06/15/21 2231    escitalopram (LEXAPRO) tablet 10 mg  10 mg Oral Daily Dima Mejia MD   10 mg at 06/16/21 0847    nebivolol (BYSTOLIC) tablet 7.5 mg  7.5 mg Oral Daily Dima Mejia MD   7.5 mg at 06/16/21 0844    insulin lispro (HUMALOG) injection vial 0-12 Units  0-12 Units Subcutaneous TID WC Dima Mejia MD   2 Units at 06/16/21 1113    insulin lispro (HUMALOG) injection vial 0-6 Units  0-6 Units Subcutaneous Nightly Dima Mejia MD        glucose (GLUTOSE) 40 % oral gel 15 g  15 g Oral PRN Dima Mejia MD        dextrose 50 % IV solution  12.5 g Intravenous PRN Dima Mejia MD        glucagon (rDNA) injection 1 mg  1 mg Intramuscular PRN Dima Mejia MD        dextrose 5 % solution  100 mL/hr Intravenous KARLI Bergman MD          sodium chloride      sodium chloride 75 mL/hr at 06/16/21 0352    dextrose         Physical Exam:  BP (!) 101/49   Pulse 63   Temp 98 °F (36.7 °C) (Oral)   Resp 16   Ht 5' (1.524 m)   Wt 154 lb (69.9 kg)   SpO2 98%   BMI 30.08 kg/m²   Wt Readings from Last 3 Encounters:   06/16/21 154 lb (69.9 kg)   03/26/21 157 lb 6.4 oz (71.4 kg)   12/01/20 157 lb (71.2 kg)     Appearance: Awake, alert, no acute respiratory distress  Skin: Intact, no rash  Head: Normocephalic, atraumatic  Eyes: EOMI, no conjunctival erythema  ENMT: No pharyngeal erythema, MMM, no rhinorrhea  Neck: Supple, no carotid bruits  Lungs: Decreased BS B/L, no wheezing  Cardiac: Regular rate and rhythm, +S1S2, no murmurs apparent  Abdomen: Soft, nontender, +bowel sounds  Extremities: Moves all extremities x 4, no lower extremity edema  Neurologic: No focal motor deficits apparent, normal mood and affect    Intake/Output:    Intake/Output Summary (Last 24 hours) at 6/16/2021 1125  Last data filed at 6/16/2021 0646  Gross per 24 hour   Intake 2084 ml   Output 350 ml   Net 1734 ml     No intake/output data recorded.     Laboratory Tests:  Recent Labs     06/14/21  0330 06/15/21  0440 06/16/21  0359    131* 131*   K 4.3 4.3 4.4    106 107   CO2 17* 18* 17*   BUN 42* 30* 23   CREATININE 1.6* 1.4* 1.3*   GLUCOSE 87 103* 96   CALCIUM 9.3 9.4 9.0     Lab Results   Component Value Date    MG 2.0 11/21/2010     Recent Labs     06/14/21  0330 06/15/21  0440 06/16/21  0359   ALKPHOS 54 51 53   ALT 6 9 9   AST 21 17 18   PROT 6.2* 6.0* 5.7*   BILITOT 0.3 0.3 0.3   LABALBU 3.5 3.5 3.3*     Recent Labs     06/13/21  1844 06/15/21  0440 06/16/21  0359   WBC 5.6 5.5 5.6   RBC 3.87 3.42* 3.23*   HGB 12.3 10.7* 10.0*   HCT 36.2 32.4* 30.8*   MCV 93.5 94.7 95.4   MCH 31.8 31.3 31.0   MCHC 34.0 33.0 32.5   RDW 13.4 13.4 13.5    201 201   MPV 10.0 9.7 9.8     Lab Results   Component Value Date    CKTOTAL 58 11/19/2010 CKMB 3.7 11/19/2010    TROPONINI <0.01 04/09/2021    TROPONINI 0.10 11/19/2010    TROPONINI 0.10 11/18/2010     Lab Results   Component Value Date    INR 1.2 08/24/2011    INR 1.1 11/23/2010    PROTIME 11.5 08/24/2011    PROTIME 11.8 11/23/2010     Lab Results   Component Value Date    TSH 2.290 05/12/2020     Lab Results   Component Value Date    LABA1C 7.9 (H) 06/13/2021     No results found for: EAG  Lab Results   Component Value Date    CHOL 221 (H) 05/12/2020    CHOL 224 (H) 05/07/2019    CHOL 240 (H) 05/09/2017     Lab Results   Component Value Date    TRIG 414 (H) 05/12/2020    TRIG 267 (H) 05/07/2019    TRIG 175 (H) 05/09/2017     Lab Results   Component Value Date    HDL 35 05/12/2020    HDL 40 05/07/2019    HDL 44 05/09/2017     Lab Results   Component Value Date    LDLCALC - (AA) 05/12/2020    LDLCALC 131 (H) 05/07/2019    LDLCALC 161 (H) 05/09/2017     Lab Results   Component Value Date    LABVLDL - (AA) 05/12/2020    LABVLDL 53 05/07/2019    LABVLDL 35 05/09/2017     No results found for: CHOLHDLRATIO  Recent Labs     06/13/21  1844   PROBNP 1,336*       Cardiac Tests:  EKG reviewed (EKG date: 6/13/21): AS/, rate 62     Telemetry: not on telemetry    Echocardiogram reviewed: 6/14/21   Normal left ventricular systolic function.   Ejection fraction is visually estimated at 55%.   Normal right ventricular size and function (TAPSE 1.9 cm).   There is doppler evidence of stage I diastolic dysfunction.   Mild aortic regurgitation.   Physiologic and/or trace tricuspid regurgitation.   PASP is estimated at 27 mmHg (normal estimated PASP). ASSESSMENT / PLAN:  1. Admitted for worsening AMS, hypoglycemia and hypotension  2. Chronic HF with improved EF: LVEF 25% on TTE 6/2011---> LVEF 60% 2017 --> 55% this admission. 3. Hypovolemic -- improved  4. NICMP s/p CRT-D   5. Cardiac cath (2010): showing minimal CAD. 6. cLBBB  7. Acute on CKD: Baseline ~1.3-1.5.  Improving with IVF (Cr 1.9 --> 1.6 --> 1.4 --> 1.3).  8. Hypotension: improving. Hx of HTN. 9. HLD  10. T2DM  11. GERD  12. Dementia   13.  Hyponatremia     - Decreased oral intake/fluid intake as an outpatient  - She was not volume overloaded so IV diuretic stopped and IV fluids started on 6/14/21 --> will stop IV fluids today  - Monitor BMP (Cr improved since admission after receiving IV fluids)  - Results of 6/14/21 echocardiogram reviewed with the patient  - Hold losartan and hydrochlorothiazide  - Continue Bystolic (with parameters)  - Surgery note reviewed  - No advanced cardiac testing planned otherwise  - Will sign off    Jordan George MD  [facility] ([facility]) Cardiology

## 2021-06-17 LAB — URINE CULTURE, ROUTINE: NORMAL

## 2021-06-28 ENCOUNTER — CARE COORDINATION (OUTPATIENT)
Dept: CASE MANAGEMENT | Age: 86
End: 2021-06-28

## 2021-06-28 ENCOUNTER — CARE COORDINATION (OUTPATIENT)
Dept: CARE COORDINATION | Age: 86
End: 2021-06-28

## 2021-06-28 NOTE — CARE COORDINATION
spoke to Agata Alonso at [facility] and requested an updated medication list be faxed

## 2021-06-28 NOTE — CARE COORDINATION
[facility] [facility] Transitions Initial Follow Up Call    Call within 2 business days of discharge: Yes    Patient: Violeta Delgadillo Patient : 1932   MRN: <[id number]>  Reason for Admission: HPOEBE  Discharge Date: 21 RARS: Readmission Risk Score: 18      Last Discharge [facility]       Complaint Diagnosis Description Type Department Provider    21 Hypotension; Hyperglycemia; Altered Mental Status PHOEBE (acute kidney injury) ([facility] [address] . .. ED to Hosp-Admission (Discharged) (ADMITTED) LINDA MiddletonW Salvador Martines DO; Rell Alejo MD      Acute Care Course: Patient admitted to [address] [place] [address] on  for c/o hypotension, hyperglycemia and AMS. She discharged  to [facility] for rehab from -. Sig Hx: Adult failure to thrive, CHF, Hyponatremia,DM, Hyperdense lesion L kidney    DME:     Conversation: Attempted to contact patient for initial transitions of care follow up. Unable to reach patient or LVM on home phone d/t no vm set up. Mobile number answered by jennifer Tello. He states patient is in [facility]. Request to  to request an updated med list faxed. Follow up plan:  Will allow time to receive fax       Non-face-to-face services provided:      Care Transitions 24 Hour Call    Care Transitions Interventions         Follow Up  Future Appointments   Date Time Provider Cielo Rice   2021 11:00 AM MD ROSSY Delgado [facility]   8/3/2021 10:00 AM MD ROSSY Delgado [facility]       Bessie Dickerson RN

## 2021-06-29 ENCOUNTER — CARE COORDINATION (OUTPATIENT)
Dept: CASE MANAGEMENT | Age: 86
End: 2021-06-29

## 2021-06-29 DIAGNOSIS — N17.9 AKI (ACUTE KIDNEY INJURY) (HCC): Primary | ICD-10-CM

## 2021-06-29 PROCEDURE — 1111F DSCHRG MED/CURRENT MED MERGE: CPT | Performed by: FAMILY MEDICINE

## 2021-06-30 ENCOUNTER — OFFICE VISIT (OUTPATIENT)
Dept: FAMILY MEDICINE CLINIC | Age: 86
End: 2021-06-30
Payer: MEDICARE

## 2021-06-30 VITALS
TEMPERATURE: 96.7 F | HEART RATE: 78 BPM | WEIGHT: 144.2 LBS | DIASTOLIC BLOOD PRESSURE: 72 MMHG | SYSTOLIC BLOOD PRESSURE: 116 MMHG | BODY MASS INDEX: 28.16 KG/M2 | OXYGEN SATURATION: 98 %

## 2021-06-30 DIAGNOSIS — Z95.810 BIVENTRICULAR IMPLANTABLE CARDIOVERTER-DEFIBRILLATOR IN SITU: ICD-10-CM

## 2021-06-30 DIAGNOSIS — G30.1 LATE ONSET ALZHEIMER'S DISEASE WITH BEHAVIORAL DISTURBANCE (HCC): ICD-10-CM

## 2021-06-30 DIAGNOSIS — I50.42 CHRONIC COMBINED SYSTOLIC AND DIASTOLIC CONGESTIVE HEART FAILURE (HCC): Primary | ICD-10-CM

## 2021-06-30 DIAGNOSIS — F02.818 LATE ONSET ALZHEIMER'S DISEASE WITH BEHAVIORAL DISTURBANCE (HCC): ICD-10-CM

## 2021-06-30 DIAGNOSIS — R62.7 FAILURE TO THRIVE IN ADULT: ICD-10-CM

## 2021-06-30 PROCEDURE — 1111F DSCHRG MED/CURRENT MED MERGE: CPT | Performed by: FAMILY MEDICINE

## 2021-06-30 PROCEDURE — 99214 OFFICE O/P EST MOD 30 MIN: CPT | Performed by: FAMILY MEDICINE

## 2021-06-30 ASSESSMENT — ENCOUNTER SYMPTOMS
PHOTOPHOBIA: 0
WHEEZING: 0
CHEST TIGHTNESS: 0
ABDOMINAL PAIN: 0
VOMITING: 0
EYE REDNESS: 0
BLOOD IN STOOL: 0
CONSTIPATION: 0
DIARRHEA: 0
EYES NEGATIVE: 1
COUGH: 1
SHORTNESS OF BREATH: 0

## 2021-06-30 NOTE — PROGRESS NOTES
OFFICE NOTE    6/30/21  Name: Jose Angel Jonas  DIQ:[birth year]60   Sex:female   Age:88 y.o. SUBJECTIVE  Chief Complaint   Patient presents with    Follow-Up from [address] [place] [address] 6/13-6/16 then to SNF, at [facility] now       HPI   Seems obsessed with going home. Overall seems to be doing well at [facility], was not taking meds or eating properly at home due to her dementia  Review of Systems   Constitutional: Positive for fatigue. Negative for appetite change, fever and unexpected weight change. HENT: Negative for congestion, ear pain and postnasal drip. Eyes: Negative. Negative for photophobia, redness and visual disturbance. Respiratory: Positive for cough. Negative for chest tightness, shortness of breath and wheezing. Cardiovascular: Negative for chest pain and palpitations. Gastrointestinal: Negative for abdominal pain, blood in stool, constipation, diarrhea and vomiting. Endocrine: Negative for cold intolerance, polydipsia and polyuria. Genitourinary: Negative for dysuria and hematuria. Musculoskeletal: Negative for arthralgias, gait problem and joint swelling. Skin: Negative for rash and wound. Allergic/Immunologic: Negative for environmental allergies and food allergies. Neurological: Negative for dizziness, tremors, weakness and headaches. Hematological: Negative for adenopathy. Does not bruise/bleed easily. Psychiatric/Behavioral: Negative for behavioral problems, confusion, dysphoric mood and sleep disturbance. All other systems reviewed and are negative.            Current Outpatient Medications:     escitalopram (LEXAPRO) 10 MG tablet, Take 1 tablet by mouth daily, Disp: 30 tablet, Rfl: 5    donepezil (ARICEPT) 10 MG tablet, Take 1 tablet by mouth nightly, Disp: 30 tablet, Rfl: 3    albuterol sulfate HFA (VENTOLIN HFA) 108 (90 Base) MCG/ACT inhaler, Inhale 2 puffs into the lungs 4 times daily as needed for Wheezing, Disp: 1 Inhaler, Rfl: 1   pioglitazone (ACTOS) 30 MG tablet, Take 1 tablet by mouth daily, Disp: 30 tablet, Rfl: 3    nebivolol (BYSTOLIC) 2.5 MG tablet, Take 3 tablets by mouth daily, Disp: 270 tablet, Rfl: 1    glipiZIDE (GLUCOTROL) 10 MG tablet, Take 1 tablet by mouth 2 times daily (before meals), Disp: 180 tablet, Rfl: 1    metFORMIN (GLUCOPHAGE) 500 MG tablet, Take 1 tablet by mouth 2 times daily (with meals), Disp: 60 tablet, Rfl: 5    potassium chloride (KLOR-CON M) 10 MEQ extended release tablet, Take 1 tablet by mouth 3 times daily 3 tablets by mouth every day (Patient taking differently: Take 10 mEq by mouth 3 times daily ), Disp: 90 tablet, Rfl: 5  Allergies   Allergen Reactions    Codeine     Statins        Past Medical History:   Diagnosis Date    Arthritis     Asthma     CAD (coronary artery disease)     Diabetes mellitus (HCC)     GERD (gastroesophageal reflux disease)     Hyperlipidemia     Hypertension     ICD (implantable cardiac defibrillator) battery depletion     Foreman (biventricular)    LBBB (left bundle branch block)     Nonischemic cardiomyopathy (HCC)      Past Surgical History:   Procedure Laterality Date    CARDIAC DEFIBRILLATOR PLACEMENT  09/01/2011    CARDIAC DEFIBRILLATOR PLACEMENT Left 05/09/2017    Bi-V gen change, Medtronic, WRAP-IT study,     CARPAL TUNNEL RELEASE      right arm    CATARACT REMOVAL WITH IMPLANT  2010    DIAGNOSTIC CARDIAC CATH LAB PROCEDURE  3/23/10    Dr. Mya Pop    left eye     Family History   Problem Relation Age of Onset    Cancer Father      Social History     Tobacco History     Smoking Status  Never Smoker    Smokeless Tobacco Use  Never Used          Alcohol History     Alcohol Use Status  No          Drug Use     Drug Use Status  No          Sexual Activity     Sexually Active  Not Currently Partners  Male Birth Control/Protection  Post-menopausal                OBJECTIVE  Vitals:    06/30/21 1059   BP: 116/72   Site: Left Upper Arm   Position: Sitting   Pulse: 78   Temp: 96.7 °F (35.9 °C)   TempSrc: Temporal   SpO2: 98%   Weight: 144 lb 3.2 oz (65.4 kg)        Body mass index is 28.16 kg/m². Orders Placed This Encounter   Procedures    DNR comfort care - arrest     This order was created through External Result Entry    AL DISCHARGE MEDS RECONCILED W/ CURRENT OUTPATIENT MED LIST        EXAM   Physical Exam  Vitals and nursing note reviewed. Constitutional:       Appearance: Normal appearance. She is well-developed. Comments: overweight   HENT:      Right Ear: Tympanic membrane, ear canal and external ear normal.      Left Ear: Tympanic membrane, ear canal and external ear normal.      Nose: Nose normal.   Eyes:      Conjunctiva/sclera: Conjunctivae normal.   Neck:      Thyroid: No thyroid mass or thyromegaly. Vascular: No JVD. Trachea: Trachea normal.   Cardiovascular:      Rate and Rhythm: Normal rate and regular rhythm. Heart sounds: Normal heart sounds. No murmur heard. No gallop. Pulmonary:      Effort: Pulmonary effort is normal.      Breath sounds: Normal breath sounds. No wheezing or rales. Abdominal:      General: Bowel sounds are normal. There is no distension. Palpations: Abdomen is soft. There is no mass. Tenderness: There is no abdominal tenderness. There is no guarding. Musculoskeletal:         General: Normal range of motion. Cervical back: Neck supple. Lymphadenopathy:      Cervical: No cervical adenopathy. Skin:     General: Skin is warm and dry. Coloration: Skin is not jaundiced. Findings: No bruising or rash. Neurological:      General: No focal deficit present. Mental Status: She is alert. She is disoriented. Sensory: No sensory deficit. Motor: No weakness or abnormal muscle tone.       Coordination: Coordination normal.      Gait: Gait normal.   Psychiatric:         Behavior: Behavior normal.      Comments: Agitated sad, repeating self. \"I just want to go home\"           [place] was seen today for follow-up from hospital.    Diagnoses and all orders for this visit:    Chronic combined systolic and diastolic congestive heart failure ([address]  -     [place] DISCHARGE MEDS RECONCILED W/ CURRENT OUTPATIENT MED LIST  Seems stable and compensated. stonger and more vocal    Late onset Alzheimer's disease with behavioral disturbance ([address]  Clearly was unable to live alone. Sad to see her so upset  Failure to thrive in adult  Was failing to take meds and eating poorly. Looks much better  Biventricular implantable cardioverter-defibrillator in situ  No recent discharges. Hospital records reviewed. No follow-ups on file.     Electronically signed by Fabienne Rivas MD on 6/30/21 at 11:58 AM EDT

## 2021-07-19 ENCOUNTER — TELEPHONE (OUTPATIENT)
Dept: FAMILY MEDICINE CLINIC | Age: 86
End: 2021-07-19

## 2021-07-19 NOTE — TELEPHONE ENCOUNTER
Dr Jessi Ansari is out of office, no appointments available. Left msg for Zaki Climes of [facility] or ED availability.

## 2021-07-19 NOTE — TELEPHONE ENCOUNTER
----- Message from Cornelialauro Eddie sent at 7/19/2021 10:27 AM EDT -----  Subject: Message to Provider    QUESTIONS  Information for Provider? Patients POA- nephmonserrat Sparrow calling in stating   the facility [place] is residing in contacted him to state her blood   sugar levels were low, system prompted to send over to [facility]- but [facility] would   not take the call or triage as shes under other medical personnel at the   facility, but representative from [facility] advised for   her to see her PCP. He would like to be contacted to discuss. ---------------------------------------------------------------------------  --------------  Gene David SARKAR  What is the best way for the office to contact you? OK to leave message on   voicemail  Preferred Call Back Phone Number? [phone]  ---------------------------------------------------------------------------  --------------  SCRIPT ANSWERS  Relationship to Patient?  Self

## 2021-07-19 NOTE — TELEPHONE ENCOUNTER
Per Bre Garay, sister is the POA and has been receiving calls from nursing staff at [facility] and BSS are dropping to 40s at times. First available appt is August 12, are we able to make medication adjustments without appt?      Information from [address] [place] [address] reply to [facility] to [facility] instead of Bre Garay

## 2021-07-19 NOTE — TELEPHONE ENCOUNTER
Have her eliminate the glipizide and then let us know in a week what her BS are. Shouldn't we be hearing this from [facility]?

## 2021-08-09 DIAGNOSIS — I42.8 CARDIOMYOPATHY, NONISCHEMIC (HCC): ICD-10-CM

## 2021-08-09 DIAGNOSIS — F43.21 ADJUSTMENT DISORDER WITH DEPRESSED MOOD: ICD-10-CM

## 2021-08-09 DIAGNOSIS — E13.9 DIABETES 1.5, MANAGED AS TYPE 2 (HCC): ICD-10-CM

## 2021-08-09 DIAGNOSIS — E11.9 TYPE 2 DIABETES MELLITUS WITHOUT COMPLICATION, WITHOUT LONG-TERM CURRENT USE OF INSULIN (HCC): ICD-10-CM

## 2021-08-09 RX ORDER — ESCITALOPRAM OXALATE 10 MG/1
10 TABLET ORAL DAILY
Qty: 30 TABLET | Refills: 5 | Status: SHIPPED
Start: 2021-08-09 | End: 2021-08-30 | Stop reason: SDUPTHER

## 2021-08-09 RX ORDER — POTASSIUM CHLORIDE 750 MG/1
10 TABLET, EXTENDED RELEASE ORAL 3 TIMES DAILY
Qty: 90 TABLET | Refills: 5 | Status: SHIPPED
Start: 2021-08-09 | End: 2022-03-04 | Stop reason: SDUPTHER

## 2021-08-09 NOTE — TELEPHONE ENCOUNTER
Last Appointment:  6/30/2021  Future Appointments   Date Time Provider Cielo Rice   10/18/2021 10:30 AM Dulce Mendiola  [address] [facility] [address]

## 2021-08-18 ENCOUNTER — TELEPHONE (OUTPATIENT)
Dept: FAMILY MEDICINE CLINIC | Age: 86
End: 2021-08-18

## 2021-08-18 DIAGNOSIS — F43.22 ADJUSTMENT DISORDER WITH ANXIETY: Primary | ICD-10-CM

## 2021-08-18 RX ORDER — LORAZEPAM 1 MG/1
1 TABLET ORAL DAILY PRN
Qty: 15 TABLET | Refills: 2 | Status: SHIPPED | OUTPATIENT
Start: 2021-08-18 | End: 2021-09-17

## 2021-08-18 NOTE — TELEPHONE ENCOUNTER
Joy calling to get a new script for Ativan sent to [facility].

## 2021-08-24 DIAGNOSIS — I42.8 CARDIOMYOPATHY, NONISCHEMIC (HCC): ICD-10-CM

## 2021-08-24 DIAGNOSIS — G30.1 LATE ONSET ALZHEIMER'S DISEASE WITH BEHAVIORAL DISTURBANCE (HCC): ICD-10-CM

## 2021-08-24 DIAGNOSIS — F02.818 LATE ONSET ALZHEIMER'S DISEASE WITH BEHAVIORAL DISTURBANCE (HCC): ICD-10-CM

## 2021-08-24 DIAGNOSIS — E13.9 DIABETES 1.5, MANAGED AS TYPE 2 (HCC): ICD-10-CM

## 2021-08-24 RX ORDER — NEBIVOLOL 2.5 MG/1
7.5 TABLET ORAL DAILY
Qty: 270 TABLET | Refills: 3 | Status: SHIPPED
Start: 2021-08-24 | End: 2022-03-04 | Stop reason: SDUPTHER

## 2021-08-24 RX ORDER — DONEPEZIL HYDROCHLORIDE 10 MG/1
10 TABLET, FILM COATED ORAL NIGHTLY
Qty: 90 TABLET | Refills: 3 | Status: SHIPPED
Start: 2021-08-24 | End: 2022-03-04 | Stop reason: SDUPTHER

## 2021-08-24 NOTE — TELEPHONE ENCOUNTER
Last Appointment:  6/30/2021  Future Appointments   Date Time Provider Cielo Rice   10/18/2021 10:30 AM Candido Galeano  [address] [facility] [address]

## 2021-08-30 DIAGNOSIS — F43.21 ADJUSTMENT DISORDER WITH DEPRESSED MOOD: ICD-10-CM

## 2021-08-30 RX ORDER — ESCITALOPRAM OXALATE 10 MG/1
15 TABLET ORAL DAILY
Qty: 45 TABLET | Refills: 5 | Status: SHIPPED
Start: 2021-08-30 | End: 2022-03-04 | Stop reason: SDUPTHER

## 2021-08-30 NOTE — TELEPHONE ENCOUNTER
Last Appointment:  6/30/2021  Future Appointments   Date Time Provider Cielo Rice   9/8/2021  3:15 PM Mak Delacruz MD [facility]   10/18/2021 10:30 AM Mak Delacruz  [address] [place] [address]

## 2021-09-08 ENCOUNTER — OFFICE VISIT (OUTPATIENT)
Dept: FAMILY MEDICINE CLINIC | Age: 86
End: 2021-09-08
Payer: MEDICARE

## 2021-09-08 VITALS
OXYGEN SATURATION: 97 % | HEART RATE: 78 BPM | SYSTOLIC BLOOD PRESSURE: 110 MMHG | TEMPERATURE: 96.7 F | DIASTOLIC BLOOD PRESSURE: 76 MMHG | BODY MASS INDEX: 28.67 KG/M2 | WEIGHT: 146.8 LBS

## 2021-09-08 DIAGNOSIS — F02.818 LATE ONSET ALZHEIMER'S DISEASE WITH BEHAVIORAL DISTURBANCE (HCC): ICD-10-CM

## 2021-09-08 DIAGNOSIS — I42.8 CARDIOMYOPATHY, NONISCHEMIC (HCC): Primary | ICD-10-CM

## 2021-09-08 DIAGNOSIS — E13.9 DIABETES 1.5, MANAGED AS TYPE 2 (HCC): ICD-10-CM

## 2021-09-08 DIAGNOSIS — G30.1 LATE ONSET ALZHEIMER'S DISEASE WITH BEHAVIORAL DISTURBANCE (HCC): ICD-10-CM

## 2021-09-08 PROCEDURE — 99214 OFFICE O/P EST MOD 30 MIN: CPT | Performed by: FAMILY MEDICINE

## 2021-09-08 PROCEDURE — 3051F HG A1C>EQUAL 7.0%<8.0%: CPT | Performed by: FAMILY MEDICINE

## 2021-09-08 ASSESSMENT — ENCOUNTER SYMPTOMS
EYE REDNESS: 0
VOMITING: 0
BLOOD IN STOOL: 0
SHORTNESS OF BREATH: 0
COUGH: 0
ABDOMINAL PAIN: 0
WHEEZING: 0
PHOTOPHOBIA: 0
DIARRHEA: 0
CHEST TIGHTNESS: 0
EYES NEGATIVE: 1
CONSTIPATION: 0

## 2021-09-08 NOTE — PROGRESS NOTES
OFFICE NOTE    9/8/21  Name: Della Styles  WXB:[birth year]   Sex:female   Age:88 y.o. SUBJECTIVE  Chief Complaint   Patient presents with    Anxiety       HPI comes in for reevaluation. In assisted living. Was anxious and distraught. We increased her lorazepam from 0.5 to 1 mg and lexapro has had time to kick in. Seems enormously better today    Review of Systems   Constitutional: Positive for fatigue. Negative for appetite change, fever and unexpected weight change. HENT: Negative for congestion, ear pain and postnasal drip. Eyes: Negative. Negative for photophobia, redness and visual disturbance. Respiratory: Negative for cough, chest tightness, shortness of breath and wheezing. Cardiovascular: Negative for chest pain and palpitations. Gastrointestinal: Negative for abdominal pain, blood in stool, constipation, diarrhea and vomiting. Endocrine: Negative for cold intolerance, polydipsia and polyuria. Genitourinary: Negative for dysuria and hematuria. Musculoskeletal: Positive for arthralgias. Negative for gait problem and joint swelling. Skin: Negative for rash and wound. Allergic/Immunologic: Negative for environmental allergies and food allergies. Neurological: Negative for dizziness, tremors, seizures, weakness, numbness and headaches. Hematological: Negative for adenopathy. Does not bruise/bleed easily. Psychiatric/Behavioral: Negative for behavioral problems, confusion, dysphoric mood and sleep disturbance. The patient is nervous/anxious. Agitation seems to have resolved. She says she is better and son agrees   All other systems reviewed and are negative.            Current Outpatient Medications:     escitalopram (LEXAPRO) 10 MG tablet, Take 1.5 tablets by mouth daily, Disp: 45 tablet, Rfl: 5    donepezil (ARICEPT) 10 MG tablet, Take 1 tablet by mouth nightly, Disp: 90 tablet, Rfl: 3    nebivolol (BYSTOLIC) 2.5 MG tablet, Take 3 tablets by mouth daily, Disp: 270 tablet, Rfl: 3    LORazepam (ATIVAN) 1 MG tablet, Take 1 tablet by mouth daily as needed for Anxiety for up to 30 days. , Disp: 15 tablet, Rfl: 2    metFORMIN (GLUCOPHAGE) 500 MG tablet, Take 1 tablet by mouth 2 times daily (with meals), Disp: 60 tablet, Rfl: 5    potassium chloride (KLOR-CON M) 10 MEQ extended release tablet, Take 1 tablet by mouth 3 times daily, Disp: 90 tablet, Rfl: 5    albuterol sulfate HFA (VENTOLIN HFA) 108 (90 Base) MCG/ACT inhaler, Inhale 2 puffs into the lungs 4 times daily as needed for Wheezing, Disp: 1 Inhaler, Rfl: 1    pioglitazone (ACTOS) 30 MG tablet, Take 1 tablet by mouth daily, Disp: 30 tablet, Rfl: 3    glipiZIDE (GLUCOTROL) 10 MG tablet, Take 1 tablet by mouth 2 times daily (before meals), Disp: 180 tablet, Rfl: 1  Allergies   Allergen Reactions    Codeine     Statins        Past Medical History:   Diagnosis Date    Arthritis     Asthma     CAD (coronary artery disease)     Diabetes mellitus ([facility]     GERD (gastroesophageal reflux disease)     Hyperlipidemia     Hypertension     ICD (implantable cardiac defibrillator) battery depletion     Kellen (biventricular)    LBBB (left bundle branch block)     Nonischemic cardiomyopathy ([facility]      Past Surgical History:   Procedure Laterality Date    CARDIAC DEFIBRILLATOR PLACEMENT  09/01/2011    CARDIAC DEFIBRILLATOR PLACEMENT Left 05/09/2017    Bi-V gen change, Medtronic, WRAP-IT study,     CARPAL TUNNEL RELEASE      right arm    CATARACT REMOVAL WITH IMPLANT  2010    DIAGNOSTIC CARDIAC CATH LAB PROCEDURE  3/23/10    Dr. Eloina Perez    left eye     Family History   Problem Relation Age of Onset    Cancer Father      Social History     Tobacco History     Smoking Status  Never Smoker    Smokeless Tobacco Use  Never Used          Alcohol History     Alcohol Use Status  No          Drug Use     Drug Use Status  No          Sexual Activity     Sexually Active  Not Currently Partners  Male Birth Control/Protection  Post-menopausal                OBJECTIVE  Vitals:    09/08/21 1535   BP: 110/76   Site: Left Upper Arm   Position: Sitting   Pulse: 78   Temp: 96.7 °F (35.9 °C)   TempSrc: Temporal   SpO2: 97%   Weight: 146 lb 12.8 oz (66.6 kg)        Body mass index is 28.67 kg/m². No orders of the defined types were placed in this encounter. EXAM   Physical Exam  Vitals and nursing note reviewed. Constitutional:       Appearance: Normal appearance. She is normal weight. HENT:      Right Ear: Tympanic membrane normal.      Left Ear: Tympanic membrane normal.   Eyes:      Conjunctiva/sclera: Conjunctivae normal.      Pupils: Pupils are equal, round, and reactive to light. Cardiovascular:      Rate and Rhythm: Normal rate and regular rhythm. Heart sounds: No murmur heard. Pulmonary:      Effort: Pulmonary effort is normal.      Breath sounds: Normal breath sounds. No wheezing or rales. Abdominal:      General: Bowel sounds are normal.      Palpations: There is no mass. Tenderness: There is no abdominal tenderness. Musculoskeletal:         General: No swelling or tenderness. Cervical back: No tenderness. Right lower leg: No edema. Left lower leg: No edema. Lymphadenopathy:      Cervical: No cervical adenopathy. Skin:     Coloration: Skin is not jaundiced. Findings: No bruising or rash. Neurological:      General: No focal deficit present. Mental Status: She is alert. She is disoriented. Psychiatric:         Mood and Affect: Mood normal.         Behavior: Behavior normal.           [place] was seen today for anxiety. Diagnoses and all orders for this visit:    Cardiomyopathy, nonischemic ([address]  No ischemic or congestive symptoms reported. Diabetes 1.5, managed as type 2 ([address]  -     Cancel: POCT glycosylated hemoglobin (Hb A1C)  A1C 7.9 2 mos ago. This is acceptable.  Will see her back in 3 mos and recheck   Late onset Alzheimer's disease with behavioral disturbance ([facility]    Agitation and depression seem much better. Best we leave meds as are      Return in about 3 months (around 12/8/2021).     Electronically signed by Bhavik Mina MD on 9/8/21 at 4:46 PM EDT

## 2021-10-13 ENCOUNTER — OFFICE VISIT (OUTPATIENT)
Dept: FAMILY MEDICINE CLINIC | Age: 86
End: 2021-10-13
Payer: MEDICARE

## 2021-10-13 VITALS
OXYGEN SATURATION: 97 % | HEART RATE: 66 BPM | TEMPERATURE: 97.5 F | DIASTOLIC BLOOD PRESSURE: 78 MMHG | SYSTOLIC BLOOD PRESSURE: 100 MMHG

## 2021-10-13 DIAGNOSIS — G30.1 LATE ONSET ALZHEIMER'S DISEASE WITH BEHAVIORAL DISTURBANCE (HCC): ICD-10-CM

## 2021-10-13 DIAGNOSIS — F02.818 LATE ONSET ALZHEIMER'S DISEASE WITH BEHAVIORAL DISTURBANCE (HCC): ICD-10-CM

## 2021-10-13 DIAGNOSIS — Z95.810 BIVENTRICULAR IMPLANTABLE CARDIOVERTER-DEFIBRILLATOR IN SITU: ICD-10-CM

## 2021-10-13 DIAGNOSIS — I42.8 CARDIOMYOPATHY, NONISCHEMIC (HCC): ICD-10-CM

## 2021-10-13 DIAGNOSIS — E13.9 DIABETES 1.5, MANAGED AS TYPE 2 (HCC): Primary | ICD-10-CM

## 2021-10-13 LAB
CHP ED QC CHECK: ABNORMAL
GLUCOSE BLD-MCNC: 117 MG/DL

## 2021-10-13 PROCEDURE — 99214 OFFICE O/P EST MOD 30 MIN: CPT | Performed by: FAMILY MEDICINE

## 2021-10-13 PROCEDURE — 82962 GLUCOSE BLOOD TEST: CPT | Performed by: FAMILY MEDICINE

## 2021-10-13 PROCEDURE — 3051F HG A1C>EQUAL 7.0%<8.0%: CPT | Performed by: FAMILY MEDICINE

## 2021-10-13 NOTE — PROGRESS NOTES
OFFICE NOTE    10/13/21  Name: Juan Ramon Landeros  JJL:[birth year]   Sex:female   Age:89 y.o. SUBJECTIVE  Chief Complaint   Patient presents with    Fatigue       HPI [place] came in with aid. Seems much happier and says she has mad friends at [facility] of Systems   Constitutional: Positive for activity change and appetite change. Negative for fatigue, fever and unexpected weight change. Both for the better   HENT: Negative for congestion, ear pain and postnasal drip. Eyes: Negative. Negative for photophobia, redness and visual disturbance. Respiratory: Negative for cough, chest tightness, shortness of breath and wheezing. Cardiovascular: Negative for chest pain, palpitations and leg swelling. Gastrointestinal: Negative for abdominal pain, blood in stool, constipation, diarrhea and vomiting. Endocrine: Negative for cold intolerance, polydipsia and polyuria. Genitourinary: Positive for urgency. Negative for dysuria and hematuria. Musculoskeletal: Positive for arthralgias. Negative for gait problem and joint swelling. Skin: Negative for rash and wound. Allergic/Immunologic: Negative for environmental allergies and food allergies. Neurological: Negative for dizziness, tremors, seizures, weakness, numbness and headaches. Hematological: Negative for adenopathy. Does not bruise/bleed easily. Psychiatric/Behavioral: Positive for confusion. Negative for behavioral problems, dysphoric mood and sleep disturbance. The patient is nervous/anxious.              Current Outpatient Medications:     escitalopram (LEXAPRO) 10 MG tablet, Take 1.5 tablets by mouth daily, Disp: 45 tablet, Rfl: 5    donepezil (ARICEPT) 10 MG tablet, Take 1 tablet by mouth nightly, Disp: 90 tablet, Rfl: 3    nebivolol (BYSTOLIC) 2.5 MG tablet, Take 3 tablets by mouth daily, Disp: 270 tablet, Rfl: 3    metFORMIN (GLUCOPHAGE) 500 MG tablet, Take 1 tablet by mouth 2 times daily (with meals), Disp: 60 tablet, Rfl: 5    potassium chloride (KLOR-CON M) 10 MEQ extended release tablet, Take 1 tablet by mouth 3 times daily, Disp: 90 tablet, Rfl: 5    albuterol sulfate HFA (VENTOLIN HFA) 108 (90 Base) MCG/ACT inhaler, Inhale 2 puffs into the lungs 4 times daily as needed for Wheezing, Disp: 1 Inhaler, Rfl: 1    pioglitazone (ACTOS) 30 MG tablet, Take 1 tablet by mouth daily, Disp: 30 tablet, Rfl: 3  Allergies   Allergen Reactions    Codeine     Statins        Past Medical History:   Diagnosis Date    Arthritis     Asthma     Biventricular implantable cardioverter-defibrillator in situ 9/10/2011    A. Medtronic Protecta XT CRT/D, model [id number], serial # [id number] implanted 9/1/11 B. Rght atrial lead is a Medtronic, model # [id number], serial # [id number].  Right ventricular lead is a Medtronic model # [id number], serial [id number]        CAD (coronary artery disease)     Diabetes mellitus ([facility]     GERD (gastroesophageal reflux disease)     Hyperlipidemia     Hypertension     ICD (implantable cardiac defibrillator) battery depletion     Joanne Tobi (biventricular)    LBBB (left bundle branch block)     Nonischemic cardiomyopathy ([facility]      Past Surgical History:   Procedure Laterality Date    CARDIAC DEFIBRILLATOR PLACEMENT  09/01/2011    CARDIAC DEFIBRILLATOR PLACEMENT Left 05/09/2017    Bi-V gen change, Medtronic, WRAP-IT study,     CARPAL TUNNEL RELEASE      right arm    CATARACT REMOVAL WITH IMPLANT  2010    DIAGNOSTIC CARDIAC CATH LAB PROCEDURE  3/23/10    Dr. Renaldo Rivera    left eye     Family History   Problem Relation Age of Onset    Cancer Father      Social History     Tobacco History     Smoking Status  Never Smoker    Smokeless Tobacco Use  Never Used          Alcohol History     Alcohol Use Status  No          Drug Use     Drug Use Status  No          Sexual Activity     Sexually Active  Not Currently Partners  Male Birth Control/Protection  Post-menopausal rash.   Neurological:      General: No focal deficit present. Mental Status: She is alert. She is disoriented. Sensory: No sensory deficit. Motor: No weakness or abnormal muscle tone. Coordination: Coordination normal.      Gait: Gait normal.   Psychiatric:         Mood and Affect: Mood normal.         Behavior: Behavior normal.           [place] was seen today for fatigue. Diagnoses and all orders for this visit:    Diabetes 1.5, managed as type 2 ([address]  -     POCT Glucose  I had requeste a1c but got BS. It was pretty normal  Cardiomyopathy, nonischemic ([address]  Seem well compensated on bystolic. 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 No discharges  Late onset Alzheimer's disease with behavioral disturbance ([address]  Seems much happier and better adjusted on Donezopril and lexapro, no changes made        No follow-ups on file.     Electronically signed by Telly Siegel MD on 10/13/21 at 4:22 PM EDT

## 2021-10-14 ASSESSMENT — ENCOUNTER SYMPTOMS
CONSTIPATION: 0
EYE REDNESS: 0
DIARRHEA: 0
WHEEZING: 0
SHORTNESS OF BREATH: 0
BLOOD IN STOOL: 0
COUGH: 0
ABDOMINAL PAIN: 0
CHEST TIGHTNESS: 0
VOMITING: 0
EYES NEGATIVE: 1
PHOTOPHOBIA: 0

## 2021-11-21 ENCOUNTER — APPOINTMENT (OUTPATIENT)
Dept: GENERAL RADIOLOGY | Age: 86
End: 2021-11-21
Payer: MEDICARE

## 2021-11-21 ENCOUNTER — HOSPITAL ENCOUNTER (EMERGENCY)
Age: 86
Discharge: NURSING, REHAB OR LONG-TERM CARE | End: 2021-11-21
Attending: EMERGENCY MEDICINE
Payer: MEDICARE

## 2021-11-21 ENCOUNTER — APPOINTMENT (OUTPATIENT)
Dept: CT IMAGING | Age: 86
End: 2021-11-21
Payer: MEDICARE

## 2021-11-21 VITALS
HEART RATE: 65 BPM | TEMPERATURE: 98 F | DIASTOLIC BLOOD PRESSURE: 74 MMHG | RESPIRATION RATE: 14 BRPM | OXYGEN SATURATION: 98 % | BODY MASS INDEX: 28.47 KG/M2 | HEIGHT: 60 IN | SYSTOLIC BLOOD PRESSURE: 119 MMHG | WEIGHT: 145 LBS

## 2021-11-21 DIAGNOSIS — S80.02XA CONTUSION OF LEFT KNEE, INITIAL ENCOUNTER: Primary | ICD-10-CM
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
## 2021-11-21 PROCEDURE — 6360000002 HC RX W HCPCS: Performed by: STUDENT IN AN ORGANIZED HEALTH CARE EDUCATION/TRAINING PROGRAM

## 2021-11-21 PROCEDURE — 73562 X-RAY EXAM OF KNEE 3: CPT

## 2021-11-21 PROCEDURE — 72125 CT NECK SPINE W/O DYE: CPT

## 2021-11-21 PROCEDURE — 99285 EMERGENCY DEPT VISIT HI MDM: CPT

## 2021-11-21 PROCEDURE — 90714 TD VACC NO PRESV 7 YRS+ IM: CPT | Performed by: STUDENT IN AN ORGANIZED HEALTH CARE EDUCATION/TRAINING PROGRAM

## 2021-11-21 PROCEDURE — 70450 CT HEAD/BRAIN W/O DYE: CPT

## 2021-11-21 PROCEDURE — 90471 IMMUNIZATION ADMIN: CPT | Performed by: STUDENT IN AN ORGANIZED HEALTH CARE EDUCATION/TRAINING PROGRAM

## 2021-11-21 RX ADMIN — CLOSTRIDIUM TETANI TOXOID ANTIGEN (FORMALDEHYDE INACTIVATED) AND CORYNEBACTERIUM DIPHTHERIAE TOXOID ANTIGEN (FORMALDEHYDE INACTIVATED) 0.5 ML: 5; 2 INJECTION, SUSPENSION INTRAMUSCULAR at 13:00

## 2021-11-21 ASSESSMENT — ENCOUNTER SYMPTOMS
NAUSEA: 0
SORE THROAT: 0
ABDOMINAL PAIN: 0
BACK PAIN: 0
SHORTNESS OF BREATH: 0
EYE PAIN: 0
DIARRHEA: 0
VOMITING: 0

## 2021-11-21 NOTE — ED PROVIDER NOTES
ATTENDING PROVIDER ATTESTATION:     Jordi Siegel presented to the emergency department for evaluation of Fall (pt was said to have fallen at [address] today. small sking tear on left arm, left sided knee pain. pt confused.) and Knee Pain   and was initially evaluated by the Medical Resident. See Original ED Note for H&P and ED course above. I have reviewed and discussed the case, including pertinent history (medical, surgical, family and social) and exam findings with the Medical Resident assigned to Jordi Robbin. I have personally performed and/or participated in the history, exam, medical decision making, and procedures and agree with all pertinent clinical information. I, Dr. Latesha Conway MD, am the primary provider of this record. I have reviewed my findings and recommendations with the assigned Medical Resident, Jordi Siegel and members of family present at the time of disposition. My findings/plan: The primary encounter diagnosis was Contusion of left knee, initial encounter. A diagnosis of Fall, initial encounter was also pertinent to this visit. New Prescriptions    No medications on file     Latesha Conway MD      Chief Complaint   Patient presents with    Fall     pt was said to have fallen at [address] today. small sking tear on left arm, left sided knee pain. pt confused.  Knee Pain       HPI   Jordi Siegel is a 80 y.o. old female presenting to the emergency department via EMS from [facility] a nursing home for an unwitnessed fall with left-sided knee pain and confusion. Patient is alert and responsive, however she appears mildly confused stating she does not know why she is here and she does not remember whether or not she fell. Per nursing home report, patient is confused at baseline. She is currently at her neurologic baseline. It is unknown whether or not she had dizziness or lightheadedness prior to the fall.   It is unknown if she had loss of consciousness or head trauma. She does deny any head pain, shortness of breath, fever, chills, nausea, vomiting, abdominal pain, or weakness at this time. She states her only pain is in her left knee which is worse with flexion. She also has a small skin tear on her left forearm which she does not remember getting. Patient states she does have a cane and walker that she is supposed to use, however she does not like to use them. I was unable to obtain a complete history due to confusion. Review of Systems   Constitutional: Negative for chills and fever. HENT: Negative for ear pain and sore throat. Eyes: Negative for pain. Respiratory: Negative for shortness of breath. Cardiovascular: Negative for chest pain. Gastrointestinal: Negative for abdominal pain, diarrhea, nausea and vomiting. Genitourinary: Negative for flank pain. Musculoskeletal: Positive for arthralgias. Negative for back pain and neck pain. Skin: Negative for rash. Neurological: Negative for headaches. Psychiatric/Behavioral: Positive for confusion. Negative for behavioral problems. The patient is not nervous/anxious. Physical Exam  Constitutional:       Appearance: Normal appearance. HENT:      Head: Normocephalic and atraumatic. Right Ear: External ear normal.      Left Ear: External ear normal.      Nose: Nose normal.      Mouth/Throat:      Mouth: Mucous membranes are moist.      Pharynx: No oropharyngeal exudate. Eyes:      Extraocular Movements: Extraocular movements intact. Conjunctiva/sclera: Conjunctivae normal.      Pupils: Pupils are equal, round, and reactive to light. Cardiovascular:      Rate and Rhythm: Normal rate. Pulses: Normal pulses. Pulmonary:      Effort: No respiratory distress. Breath sounds: Normal breath sounds. Abdominal:      Palpations: Abdomen is soft. Tenderness: There is no abdominal tenderness.  There is no guarding or rebound. Musculoskeletal:         General: No deformity or signs of injury. Cervical back: Neck supple. No rigidity. Skin:     General: Skin is warm and dry. Capillary Refill: Capillary refill takes less than 2 seconds. Findings: Lesion present. Comments: Small 2 cm skin tear on the left forearm with dried blood   Neurological:      General: No focal deficit present. Mental Status: She is alert and oriented to person, place, and time. Mental status is at baseline. Comments: Slight confusion, patient does not remember what happened or why she is here. Psychiatric:         Attention and Perception: Attention normal.         Mood and Affect: Mood normal.         Speech: Speech normal.         Behavior: Behavior normal. Behavior is cooperative. Thought Content: Thought content normal.         Cognition and Memory: Cognition is impaired. Memory is impaired. She exhibits impaired recent memory. Procedures     MDM   Patient is an 49-year-old female who presents from a nursing facility for an unwitnessed fall. We ordered imaging of her head and neck along with a knee x-ray. Imaging came back negative for any acute process or fracture. Patient was reevaluated and seen in no acute distress. She is at her neurologic baseline. She was given Tdap for her skin tear. She agreed to be discharged back to the nursing facility. ED Course as of 11/21/21 1205   Sun Nov 21, 2021   1051 PHYSICAL EXAM:  Vitals Reviewed  Constitutional/General: Alert and oriented x2, well appearing, non toxic in NAD. HEENT: Normocephalic and atraumatic. PERRL, EOMI. Oropharynx clear, handling secretions, no trismus. No raccoon eyes. No meza's sign. Neck: Supple, full ROM, no cervical spine tenderness. Pulmonary: Lungs clear to auscultation bilaterally, no wheezes, rales, or rhonchi. Not in respiratory distress. Cardiovascular:  Regular rate and rhythm, no murmurs, gallops, or rubs.  2+ distal pulses. Chest: No chest wall tenderness. No crepitus. Abdomen: Soft, non tender, non distended,   Extremities: Moves all extremities x 4. Warm and well perfused. Skin tear to right arm with no tenderness, normal ROM. Left knee-mild swelling and ecchymoses, normal ROM, soft compartments. Distal pulses intact in all extremities. Back: No midline thoracic or lumbar tenderness. Skin: warm and dry without rash. Neurologic: Awake, oriented to person and place only, 5/5 strength in all extremities. Normal sensation in all extremities. Psych: Normal Affect. [JA]      ED Course User Index  [ANIVAL] Samantha Castellano MD       --------------------------------------------- PAST HISTORY ---------------------------------------------  Past Medical History:  has a past medical history of Arthritis, Asthma, Biventricular implantable cardioverter-defibrillator in situ, CAD (coronary artery disease), Diabetes mellitus ([facility] [address] GERD (gastroesophageal reflux disease), Hyperlipidemia, Hypertension, ICD (implantable cardiac defibrillator) battery depletion, LBBB (left bundle branch block), and Nonischemic cardiomyopathy ([address] Past Surgical History:  has a past surgical history that includes Cataract removal with implant (2010); Retinal detachment surgery (1998); Carpal tunnel release; Diagnostic Cardiac Cath Lab Procedure (3/23/10); Cardiac defibrillator placement (09/01/2011); and Cardiac defibrillator placement (Left, 05/09/2017). Social History:  reports that she has never smoked. She has never used smokeless tobacco. She reports that she does not drink alcohol and does not use drugs. Family History: family history includes Cancer in her father. The patients home medications have been reviewed.     Allergies: Codeine and Statins    -------------------------------------------------- RESULTS -------------------------------------------------  Labs:  No results found for this visit on 11/21/21. Radiology:  CT Head WO Contrast   Final Result   1. There is no acute intracranial abnormality. Specifically, there is no   intracranial hemorrhage. 2. Atrophy and periventricular leukomalacia,         CT CERVICAL SPINE WO CONTRAST   Final Result   1. There is no acute compression fracture or subluxation of the cervical   spine. 2. Advanced multilevel degenerative disc and degenerative joint disease. XR KNEE LEFT (3 VIEWS)   Final Result   1. There is no fracture or dislocation of the left knee. 2. Tricompartmental degenerative changes. ------------------------- NURSING NOTES AND VITALS REVIEWED ---------------------------  Date / Time Roomed:  11/21/2021 10:18 AM  ED Bed Assignment:  [id number]    The nursing notes within the ED encounter and vital signs as below have been reviewed. /73   Pulse 65   Temp 98 °F (36.7 °C) (Oral)   Resp 14   Ht 5' (1.524 m)   Wt 145 lb (65.8 kg)   SpO2 98%   BMI 28.32 kg/m²   Oxygen Saturation Interpretation: Normal      ------------------------------------------ PROGRESS NOTES ------------------------------------------  11:25 AM EST  I have spoken with the Patient and/or Family and discussed todays results, in addition to providing specific details for the plan of care and counseling regarding the diagnosis and prognosis. Labs and Imaging discussed with patient including appropriate follow- up and re-evaluation. Their questions are answered at this time and they are agreeable with the plan. I discussed at length with them reasons for immediate return here for re evaluation. They will followup with PCP by calling their office Next Business Day      --------------------------------- ADDITIONAL PROVIDER NOTES ---------------------------------  At this time the patient is without objective evidence of an acute process requiring hospitalization or inpatient management.   They have remained hemodynamically stable throughout their entire ED visit and are stable for discharge with outpatient follow-up. The plan has been discussed in detail and they are aware of the specific conditions for emergent return, as well as the importance of follow-up. New Prescriptions    No medications on file       Diagnosis:  1. Contusion of left knee, initial encounter    2. Fall, initial encounter        Disposition:  Patient's disposition: Discharge to nursing home  Patient's condition is stable.              Pedro Vazquez DO  Resident  11/21/21 4668       Lucille Pompa MD  11/21/21 6043

## 2021-11-21 NOTE — ED NOTES
PAS on site for transfer back to [facility].       Roma Prince RN  11/21/21 6758

## 2021-11-21 NOTE — ED NOTES
[facility] called to [facility], [address] [place] [address], [phone]. PAS eta 2-3 hours, will try to outsource for faster time.      Flor Blackwell RN  11/21/21 5489

## 2021-11-26 RX ORDER — OMEPRAZOLE 20 MG/1
20 CAPSULE, DELAYED RELEASE ORAL
Qty: 30 CAPSULE | Refills: 1 | Status: SHIPPED | OUTPATIENT
Start: 2021-11-26 | End: 2021-12-21 | Stop reason: SDUPTHER

## 2021-12-21 DIAGNOSIS — E11.9 TYPE 2 DIABETES MELLITUS WITHOUT COMPLICATION, WITHOUT LONG-TERM CURRENT USE OF INSULIN (HCC): ICD-10-CM

## 2021-12-21 DIAGNOSIS — E11.65 TYPE 2 DIABETES MELLITUS WITH HYPERGLYCEMIA, WITHOUT LONG-TERM CURRENT USE OF INSULIN (HCC): ICD-10-CM

## 2021-12-22 ENCOUNTER — APPOINTMENT (OUTPATIENT)
Dept: CT IMAGING | Age: 86
DRG: 536 | End: 2021-12-22
Payer: MEDICARE

## 2021-12-22 ENCOUNTER — APPOINTMENT (OUTPATIENT)
Dept: GENERAL RADIOLOGY | Age: 86
DRG: 536 | End: 2021-12-22
Payer: MEDICARE

## 2021-12-22 ENCOUNTER — HOSPITAL ENCOUNTER (INPATIENT)
Age: 86
LOS: 1 days | Discharge: NURSING, REHAB OR LONG-TERM CARE | DRG: 536 | End: 2021-12-23
Attending: EMERGENCY MEDICINE | Admitting: INTERNAL MEDICINE
Payer: MEDICARE

## 2021-12-22 DIAGNOSIS — S32.9XXA CLOSED NONDISPLACED FRACTURE OF PELVIS, UNSPECIFIED PART OF PELVIS, INITIAL ENCOUNTER (HCC): Primary | ICD-10-CM

## 2021-12-22 PROBLEM — S32.592A INFERIOR PUBIC RAMUS FRACTURE, LEFT, CLOSED, INITIAL ENCOUNTER (HCC): Status: ACTIVE | Noted: 2021-12-22

## 2021-12-22 LAB
ALBUMIN SERPL-MCNC: 3.6 G/DL (ref 3.5–5.2)
ALP BLD-CCNC: 127 U/L (ref 35–104)
ALT SERPL-CCNC: 7 U/L (ref 0–32)
ANION GAP SERPL CALCULATED.3IONS-SCNC: 11 MMOL/L (ref 7–16)
AST SERPL-CCNC: 16 U/L (ref 0–31)
BASOPHILS ABSOLUTE: 0.04 E9/L (ref 0–0.2)
BASOPHILS RELATIVE PERCENT: 0.7 % (ref 0–2)
BILIRUB SERPL-MCNC: 0.2 MG/DL (ref 0–1.2)
BUN BLDV-MCNC: 29 MG/DL (ref 6–23)
CALCIUM SERPL-MCNC: 9.6 MG/DL (ref 8.6–10.2)
CHLORIDE BLD-SCNC: 104 MMOL/L (ref 98–107)
CO2: 20 MMOL/L (ref 22–29)
CREAT SERPL-MCNC: 1.4 MG/DL (ref 0.5–1)
EOSINOPHILS ABSOLUTE: 0.14 E9/L (ref 0.05–0.5)
EOSINOPHILS RELATIVE PERCENT: 2.6 % (ref 0–6)
GFR AFRICAN AMERICAN: 43
GFR NON-AFRICAN AMERICAN: 35 ML/MIN/1.73
GLUCOSE BLD-MCNC: 111 MG/DL
GLUCOSE BLD-MCNC: 95 MG/DL (ref 74–99)
HCT VFR BLD CALC: 33.6 % (ref 34–48)
HEMOGLOBIN: 10.8 G/DL (ref 11.5–15.5)
IMMATURE GRANULOCYTES #: 0.01 E9/L
IMMATURE GRANULOCYTES %: 0.2 % (ref 0–5)
LYMPHOCYTES ABSOLUTE: 1.85 E9/L (ref 1.5–4)
LYMPHOCYTES RELATIVE PERCENT: 34.5 % (ref 20–42)
MCH RBC QN AUTO: 31 PG (ref 26–35)
MCHC RBC AUTO-ENTMCNC: 32.1 % (ref 32–34.5)
MCV RBC AUTO: 96.6 FL (ref 80–99.9)
METER GLUCOSE: 111 MG/DL (ref 74–99)
MONOCYTES ABSOLUTE: 0.57 E9/L (ref 0.1–0.95)
MONOCYTES RELATIVE PERCENT: 10.6 % (ref 2–12)
NEUTROPHILS ABSOLUTE: 2.76 E9/L (ref 1.8–7.3)
NEUTROPHILS RELATIVE PERCENT: 51.4 % (ref 43–80)
PDW BLD-RTO: 14.2 FL (ref 11.5–15)
PLATELET # BLD: 215 E9/L (ref 130–450)
PMV BLD AUTO: 9.6 FL (ref 7–12)
POTASSIUM REFLEX MAGNESIUM: 5.2 MMOL/L (ref 3.5–5)
RBC # BLD: 3.48 E12/L (ref 3.5–5.5)
SODIUM BLD-SCNC: 135 MMOL/L (ref 132–146)
TOTAL PROTEIN: 6.4 G/DL (ref 6.4–8.3)
WBC # BLD: 5.4 E9/L (ref 4.5–11.5)

## 2021-12-22 PROCEDURE — G0378 HOSPITAL OBSERVATION PER HR: HCPCS

## 2021-12-22 PROCEDURE — 72125 CT NECK SPINE W/O DYE: CPT

## 2021-12-22 PROCEDURE — 6370000000 HC RX 637 (ALT 250 FOR IP): Performed by: STUDENT IN AN ORGANIZED HEALTH CARE EDUCATION/TRAINING PROGRAM

## 2021-12-22 PROCEDURE — 82962 GLUCOSE BLOOD TEST: CPT

## 2021-12-22 PROCEDURE — 99285 EMERGENCY DEPT VISIT HI MDM: CPT

## 2021-12-22 PROCEDURE — 1200000000 HC SEMI PRIVATE

## 2021-12-22 PROCEDURE — 73590 X-RAY EXAM OF LOWER LEG: CPT

## 2021-12-22 PROCEDURE — 85025 COMPLETE CBC W/AUTO DIFF WBC: CPT

## 2021-12-22 PROCEDURE — 93005 ELECTROCARDIOGRAM TRACING: CPT | Performed by: INTERNAL MEDICINE

## 2021-12-22 PROCEDURE — 74176 CT ABD & PELVIS W/O CONTRAST: CPT

## 2021-12-22 PROCEDURE — 73552 X-RAY EXAM OF FEMUR 2/>: CPT

## 2021-12-22 PROCEDURE — 80053 COMPREHEN METABOLIC PANEL: CPT

## 2021-12-22 PROCEDURE — 99222 1ST HOSP IP/OBS MODERATE 55: CPT | Performed by: INTERNAL MEDICINE

## 2021-12-22 PROCEDURE — 73502 X-RAY EXAM HIP UNI 2-3 VIEWS: CPT

## 2021-12-22 PROCEDURE — 73564 X-RAY EXAM KNEE 4 OR MORE: CPT

## 2021-12-22 PROCEDURE — 96372 THER/PROPH/DIAG INJ SC/IM: CPT

## 2021-12-22 PROCEDURE — 70450 CT HEAD/BRAIN W/O DYE: CPT

## 2021-12-22 PROCEDURE — 6360000002 HC RX W HCPCS: Performed by: STUDENT IN AN ORGANIZED HEALTH CARE EDUCATION/TRAINING PROGRAM

## 2021-12-22 PROCEDURE — 71045 X-RAY EXAM CHEST 1 VIEW: CPT

## 2021-12-22 RX ORDER — ONDANSETRON 4 MG/1
4 TABLET, ORALLY DISINTEGRATING ORAL EVERY 8 HOURS PRN
Status: DISCONTINUED | OUTPATIENT
Start: 2021-12-22 | End: 2021-12-23 | Stop reason: HOSPADM

## 2021-12-22 RX ORDER — ACETAMINOPHEN 650 MG/1
650 SUPPOSITORY RECTAL EVERY 6 HOURS PRN
Status: DISCONTINUED | OUTPATIENT
Start: 2021-12-22 | End: 2021-12-23 | Stop reason: HOSPADM

## 2021-12-22 RX ORDER — SODIUM CHLORIDE 0.9 % (FLUSH) 0.9 %
10 SYRINGE (ML) INJECTION PRN
Status: DISCONTINUED | OUTPATIENT
Start: 2021-12-22 | End: 2021-12-23 | Stop reason: HOSPADM

## 2021-12-22 RX ORDER — PANTOPRAZOLE SODIUM 40 MG/1
40 TABLET, DELAYED RELEASE ORAL
Status: DISCONTINUED | OUTPATIENT
Start: 2021-12-23 | End: 2021-12-23 | Stop reason: HOSPADM

## 2021-12-22 RX ORDER — SENNA PLUS 8.6 MG/1
1 TABLET ORAL DAILY PRN
Status: DISCONTINUED | OUTPATIENT
Start: 2021-12-22 | End: 2021-12-23 | Stop reason: HOSPADM

## 2021-12-22 RX ORDER — SODIUM CHLORIDE 0.9 % (FLUSH) 0.9 %
10 SYRINGE (ML) INJECTION EVERY 12 HOURS SCHEDULED
Status: DISCONTINUED | OUTPATIENT
Start: 2021-12-22 | End: 2021-12-23 | Stop reason: HOSPADM

## 2021-12-22 RX ORDER — ALBUTEROL SULFATE 90 UG/1
2 AEROSOL, METERED RESPIRATORY (INHALATION) 4 TIMES DAILY PRN
Status: DISCONTINUED | OUTPATIENT
Start: 2021-12-22 | End: 2021-12-22 | Stop reason: CLARIF

## 2021-12-22 RX ORDER — ESCITALOPRAM OXALATE 10 MG/1
15 TABLET ORAL DAILY
Status: DISCONTINUED | OUTPATIENT
Start: 2021-12-22 | End: 2021-12-23 | Stop reason: HOSPADM

## 2021-12-22 RX ORDER — METOPROLOL SUCCINATE 25 MG/1
25 TABLET, EXTENDED RELEASE ORAL DAILY
Refills: 3 | Status: DISCONTINUED | OUTPATIENT
Start: 2021-12-22 | End: 2021-12-23 | Stop reason: HOSPADM

## 2021-12-22 RX ORDER — DEXTROSE MONOHYDRATE 25 G/50ML
12.5 INJECTION, SOLUTION INTRAVENOUS PRN
Status: DISCONTINUED | OUTPATIENT
Start: 2021-12-22 | End: 2021-12-23 | Stop reason: HOSPADM

## 2021-12-22 RX ORDER — DEXTROSE MONOHYDRATE 50 MG/ML
100 INJECTION, SOLUTION INTRAVENOUS PRN
Status: DISCONTINUED | OUTPATIENT
Start: 2021-12-22 | End: 2021-12-23 | Stop reason: HOSPADM

## 2021-12-22 RX ORDER — POTASSIUM CHLORIDE 7.45 MG/ML
10 INJECTION INTRAVENOUS PRN
Status: DISCONTINUED | OUTPATIENT
Start: 2021-12-22 | End: 2021-12-23 | Stop reason: HOSPADM

## 2021-12-22 RX ORDER — SODIUM CHLORIDE 9 MG/ML
25 INJECTION, SOLUTION INTRAVENOUS PRN
Status: DISCONTINUED | OUTPATIENT
Start: 2021-12-22 | End: 2021-12-23 | Stop reason: HOSPADM

## 2021-12-22 RX ORDER — NICOTINE POLACRILEX 4 MG
15 LOZENGE BUCCAL PRN
Status: DISCONTINUED | OUTPATIENT
Start: 2021-12-22 | End: 2021-12-23 | Stop reason: HOSPADM

## 2021-12-22 RX ORDER — ALBUTEROL SULFATE 2.5 MG/3ML
2.5 SOLUTION RESPIRATORY (INHALATION) 4 TIMES DAILY PRN
Status: DISCONTINUED | OUTPATIENT
Start: 2021-12-22 | End: 2021-12-23 | Stop reason: HOSPADM

## 2021-12-22 RX ORDER — POTASSIUM CHLORIDE 20 MEQ/1
40 TABLET, EXTENDED RELEASE ORAL PRN
Status: DISCONTINUED | OUTPATIENT
Start: 2021-12-22 | End: 2021-12-23 | Stop reason: HOSPADM

## 2021-12-22 RX ORDER — DONEPEZIL HYDROCHLORIDE 10 MG/1
10 TABLET, FILM COATED ORAL NIGHTLY
Status: DISCONTINUED | OUTPATIENT
Start: 2021-12-22 | End: 2021-12-23 | Stop reason: HOSPADM

## 2021-12-22 RX ORDER — ACETAMINOPHEN 325 MG/1
650 TABLET ORAL EVERY 6 HOURS PRN
Status: DISCONTINUED | OUTPATIENT
Start: 2021-12-22 | End: 2021-12-23 | Stop reason: HOSPADM

## 2021-12-22 RX ORDER — ONDANSETRON 2 MG/ML
4 INJECTION INTRAMUSCULAR; INTRAVENOUS EVERY 6 HOURS PRN
Status: DISCONTINUED | OUTPATIENT
Start: 2021-12-22 | End: 2021-12-23 | Stop reason: HOSPADM

## 2021-12-22 RX ADMIN — DONEPEZIL HYDROCHLORIDE 10 MG: 10 TABLET, FILM COATED ORAL at 23:58

## 2021-12-22 RX ADMIN — ENOXAPARIN SODIUM 40 MG: 40 INJECTION SUBCUTANEOUS at 17:00

## 2021-12-22 NOTE — ED PROVIDER NOTES
Carpal tunnel release; Diagnostic Cardiac Cath Lab Procedure (3/23/10); Cardiac defibrillator placement (09/01/2011); and Cardiac defibrillator placement (Left, 05/09/2017). Social History:  reports that she has never smoked. She has never used smokeless tobacco. She reports that she does not drink alcohol and does not use drugs. Family History: family history includes Cancer in her father. . Unless otherwise noted, family history is non contributory    The patients home medications have been reviewed. Allergies: Codeine and Statins    I have reviewed the past medical history, past surgical history, social history, and family history    ---------------------------------------------------PHYSICAL EXAM--------------------------------------    Constitutional/General: Alert and oriented x1  Head: Normocephalic and atraumatic  Eyes: PERRL, EOMI, sclera non icteric  ENT: Oropharynx clear, handling secretions  Neck: Supple, full ROM, no stridor, no meningeal signs. No midline spinal tenderness. Back: no midline c/t/l spine tenderness. Respiratory: Lungs clear to auscultation bilaterally, no wheezes, rales, or rhonchi. Not in respiratory distress  Cardiovascular:  Regular rate. Regular rhythm. No murmurs, no gallops, no rubs. 2+ distal pulses. Equal extremity pulses. Gastrointestinal:  Abdomen Soft, Non tender, Non distended. No rebound, guarding, or rigidity. No pulsatile masses. Musculoskeletal: Moves all extremities x 4. Warm and well perfused, no clubbing, no cyanosis, no edema. Capillary refill <3 seconds    Left lower extremity: Tenderness along the left groin and left anterior tibia. Able to straight leg raise and no pain with axial loading or logroll. She has no lateral hip tenderness. Neurovascularly intact distally. 2+ DP/PT pulses. Capillary refill <3 secondary. Warm and well perfused. Sural, saphenous, deep peroneal, peroneal, and tibial nerves intact. Sensation intact. 5/5 strength.  Achilies tendon intact. No evidence of compartment syndrome. Skin: skin warm and dry. No rashes. Neurologic: GCS 15, no focal deficits, symmetric strength 5/5 in the upper and lower extremities bilaterally  Psychiatric: Normal Affect          -------------------------------------------------- RESULTS -------------------------------------------------  I have personally reviewed all laboratory and imaging results for this patient. Results are listed below.      LABS: (Lab results interpreted by me)  Results for orders placed or performed during the hospital encounter of 12/22/21   CBC Auto Differential   Result Value Ref Range    WBC 5.4 4.5 - 11.5 E9/L    RBC 3.48 (L) 3.50 - 5.50 E12/L    Hemoglobin 10.8 (L) 11.5 - 15.5 g/dL    Hematocrit 33.6 (L) 34.0 - 48.0 %    MCV 96.6 80.0 - 99.9 fL    MCH 31.0 26.0 - 35.0 pg    MCHC 32.1 32.0 - 34.5 %    RDW 14.2 11.5 - 15.0 fL    Platelets 717 319 - 194 E9/L    MPV 9.6 7.0 - 12.0 fL    Neutrophils % 51.4 43.0 - 80.0 %    Immature Granulocytes % 0.2 0.0 - 5.0 %    Lymphocytes % 34.5 20.0 - 42.0 %    Monocytes % 10.6 2.0 - 12.0 %    Eosinophils % 2.6 0.0 - 6.0 %    Basophils % 0.7 0.0 - 2.0 %    Neutrophils Absolute 2.76 1.80 - 7.30 E9/L    Immature Granulocytes # 0.01 E9/L    Lymphocytes Absolute 1.85 1.50 - 4.00 E9/L    Monocytes Absolute 0.57 0.10 - 0.95 E9/L    Eosinophils Absolute 0.14 0.05 - 0.50 E9/L    Basophils Absolute 0.04 0.00 - 0.20 E9/L   Comprehensive Metabolic Panel w/ Reflex to MG   Result Value Ref Range    Sodium 135 132 - 146 mmol/L    Potassium reflex Magnesium 5.2 (H) 3.5 - 5.0 mmol/L    Chloride 104 98 - 107 mmol/L    CO2 20 (L) 22 - 29 mmol/L    Anion Gap 11 7 - 16 mmol/L    Glucose 95 74 - 99 mg/dL    BUN 29 (H) 6 - 23 mg/dL    CREATININE 1.4 (H) 0.5 - 1.0 mg/dL    GFR Non-African American 35 >=60 mL/min/1.73    GFR African American 43     Calcium 9.6 8.6 - 10.2 mg/dL    Total Protein 6.4 6.4 - 8.3 g/dL    Albumin 3.6 3.5 - 5.2 g/dL    Total Bilirubin 0.2 0.0 - 1.2 mg/dL    Alkaline Phosphatase 127 (H) 35 - 104 U/L    ALT 7 0 - 32 U/L    AST 16 0 - 31 U/L   POCT glucose   Result Value Ref Range    Glucose 111 mg/dL   POCT Glucose   Result Value Ref Range    Meter Glucose 111 (H) 74 - 99 mg/dL   ,       RADIOLOGY:  Interpreted by Radiologist unless otherwise specified  CT HEAD WO CONTRAST   Final Result   No acute intracranial abnormality. No acute fracture or subluxation in the cervical spine. Marked vascular calcification. Ultrasound may be obtained to evaluate for   potential hemodynamically significant stenosis in the carotid bulbs. RECOMMENDATIONS:   Unavailable         CT ABDOMEN PELVIS WO CONTRAST Additional Contrast? None   Final Result   Nondisplaced left pubic rami fractures appearing acute to subacute. CT CERVICAL SPINE WO CONTRAST   Final Result   No acute intracranial abnormality. No acute fracture or subluxation in the cervical spine. Marked vascular calcification. Ultrasound may be obtained to evaluate for   potential hemodynamically significant stenosis in the carotid bulbs. RECOMMENDATIONS:   Unavailable         XR TIBIA FIBULA LEFT (2 VIEWS)   Final Result   No definite evidence for acute fracture or dislocation. However lucency at   the medial talar dome could represent osteochondral defect. If further   imaging needed MRI suggested. Heterogeneous osteopenia suggests osteoporosis. Moderate degenerative change medial joint space of the knee. There is also   chondrocalcinosis at the knee. XR HIP 2-3 VW W PELVIS LEFT   Final Result   No acute fracture is identified. XR FEMUR LEFT (MIN 2 VIEWS)   Final Result   No acute fracture is identified. XR KNEE LEFT (MIN 4 VIEWS)   Final Result   No acute osseous abnormality is identified. XR CHEST PORTABLE   Final Result   1. No radiographic evidence of an acute thoracic injury.    2. Increased prominence of the pulmonary vascular markings may be positional   or represent mild interval progression of CHF. EKG Interpretation  Interpreted by emergency department physician, Dr. Dash Hutchison     Date of EK21  Time: 1134    Rhythm: paced  Rate: paced  Axis: right  Conduction: nonspecific interventricular conduction block  ST Segments: no acute change  T Waves: no acute change    Clinical Impression: paced rhythm, no acute ischemic changes  Comparison to prior EKG: stable as compared to patient's most recent EKG      ------------------------- NURSING NOTES AND VITALS REVIEWED ---------------------------   The nursing notes within the ED encounter and vital signs as below have been reviewed by myself  BP (!) 118/57   Pulse 69   Temp 97.3 °F (36.3 °C) (Oral)   Resp 16   SpO2 95%     Oxygen Saturation Interpretation: Normal    The patients available past medical records and past encounters were reviewed.         ------------------------------ ED COURSE/MEDICAL DECISION MAKING----------------------  Medications   sodium chloride flush 0.9 % injection 10 mL (has no administration in time range)   sodium chloride flush 0.9 % injection 10 mL (has no administration in time range)   0.9 % sodium chloride infusion (has no administration in time range)   potassium chloride (KLOR-CON M) extended release tablet 40 mEq (has no administration in time range)     Or   potassium bicarb-citric acid (EFFER-K) effervescent tablet 40 mEq (has no administration in time range)     Or   potassium chloride 10 mEq/100 mL IVPB (Peripheral Line) (has no administration in time range)   enoxaparin (LOVENOX) injection 40 mg (40 mg SubCUTAneous Given 21 1700)   ondansetron (ZOFRAN-ODT) disintegrating tablet 4 mg (has no administration in time range)     Or   ondansetron (ZOFRAN) injection 4 mg (has no administration in time range)   senna (SENOKOT) tablet 8.6 mg (has no administration in time range)   acetaminophen (TYLENOL) tablet 650 mg (has no administration in time range)     Or   acetaminophen (TYLENOL) suppository 650 mg (has no administration in time range)   albuterol sulfate  (90 Base) MCG/ACT inhaler 2 puff (has no administration in time range)   donepezil (ARICEPT) tablet 10 mg (has no administration in time range)   escitalopram (LEXAPRO) tablet 15 mg (15 mg Oral Not Given 12/22/21 1941)   metFORMIN (GLUCOPHAGE) tablet 500 mg (0 mg Oral Held 12/22/21 1938)   metoprolol succinate (TOPROL XL) extended release tablet 25 mg (0 mg Oral Held 12/22/21 1939)   pantoprazole (PROTONIX) tablet 40 mg (has no administration in time range)   insulin lispro (HUMALOG) injection vial 0-12 Units (0 Units SubCUTAneous Held 12/22/21 1938)   insulin lispro (HUMALOG) injection vial 0-6 Units (has no administration in time range)   glucose (GLUTOSE) 40 % oral gel 15 g (has no administration in time range)   dextrose 50 % IV solution (has no administration in time range)   glucagon (rDNA) injection 1 mg (has no administration in time range)   dextrose 5 % solution (has no administration in time range)           The cardiac monitor revealed sinus rhythm with a heart rate in the 60s as interpreted by me. The cardiac monitor was ordered secondary to the patient's fall and to monitor the patient for dysrhythmia. CPT [id number]       I, Dr. Román Olvera, am the primary provider of record    Medical Decision Making:   Fall  Pelvic fracture  Closed, neuro intact  She could not walk secondary to pain at the assisted living (I spoke with the staff and NP there)  I spoke with ortho here, they will consult,   I spoke with Medicine for admission    Oxygen Saturation Interpretation: 95 % on RA.          Re-Evaluations:       No change      This patient's ED course included: a personal history and physicial examination, re-evaluation prior to disposition and complex medical decision making and emergency management    This patient has remained hemodynamically stable during their ED

## 2021-12-22 NOTE — CONSULTS
CHIEF COMPLAINT: Pre-op    HISTORY OF PRESENT ILLNESS: Patient is a 80 y.o. female seen at the request of Lazarus Handler, MD and followed at our office by Dr. Carolann Salazar. Patient presented with fall and diagnosed with fracture. Cardiology consulted for pre-op. Patient is pleasantly confused, but denies CP, angina or any symptoms to suggest CHF. Past Medical History:    1. Hypertension  2. Hyperlipidemia  3. Type 2 diabetes mellitus: Hemoglobin A1c 6.0 (5/7/2019)  4. Nonischemic cardiomyopathy /HF with improved EF:  · Cardiac catheterization 3/23/2010: D1: 30-40% stenosis, ramus-proximal 30-40% stenosis, RCA-ostial 40% stenosis. · TTE: 11/19/10:  LVEF 35-40%  · TTE 6/16/11: LVEF 25%. Stage l diastolic dysfunction. Mild mitral, tricuspid and aortic regurgitation. Moderately increased LA volume  · TTE: 2/2012 improved to 60% by echo & 57% by [facility]GA in 3-12  · TTE: 1/11/17: (Dr. Carolann Salazar): LVEF 60%, no wall motion abnormality, stage I diastolic dysfunction, normal RV function and structure, trace MR, trace TR, RVSP 48 mmHg, trace AR. 5.  CRT-D in situ  · Medtronic: DOI: 9/1/2011  · ICD generator change: Medtronic CRT-D DOI: 5/9/2017  6. Chronic left bundle branch block  7. Recurrent UTI  8. History of asthma  9. GERD  10. History of left eye retinal detachment  11. History of right carpal tunnel release  12. History of cataract surgery  13. Dementia: On Aricept  14. Depression/anxiety  15. CKD  16. Hx of Gastric ulcers (per niece)       Past Medical History:   Diagnosis Date    Arthritis     Asthma     Biventricular implantable cardioverter-defibrillator in situ 9/10/2011    A. Medtronic Protecta XT CRT/D, model [id number], serial # [id number] implanted 9/1/11 B. Rght atrial lead is a Medtronic, model # [id number], serial # [id number].  Right ventricular lead is a Medtronic model # [id number], serial [id number]        CAD (coronary artery disease)     Diabetes mellitus (HCC)     GERD (gastroesophageal reflux disease)     Hyperlipidemia     Hypertension     ICD (implantable cardiac defibrillator) battery depletion     Kisha Ask (biventricular)    LBBB (left bundle branch block)     Nonischemic cardiomyopathy (HCC)        Patient Active Problem List   Diagnosis    Cardiomyopathy, nonischemic (HCC)    Bundle branch block, left    Asthma    Biventricular implantable cardioverter-defibrillator in situ    CAD (coronary artery disease)    Diabetes 1.5, [facility]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 ([facility]    Patient in clinical research study    Insomnia due to medical condition    Late onset Alzheimer's disease with behavioral disturbance ([facility] [address]    PHOEBE (acute kidney injury) ([facility] [address]    Failure to thrive in adult    Mild protein-calorie malnutrition ([facility] [address]    Pelvic fracture (HCC)       Allergies   Allergen Reactions    Codeine     Statins        Current Facility-Administered Medications   Medication Dose Route Frequency Provider Last Rate Last Admin    sodium chloride flush 0.9 % injection 10 mL  10 mL IntraVENous 2 times per day Oksana Alejo MD        sodium chloride flush 0.9 % injection 10 mL  10 mL IntraVENous PRN Oksana Alejo MD        0.9 % sodium chloride infusion  25 mL IntraVENous PRN Oksana Alejo MD        potassium chloride (KLOR-CON M) extended release tablet 40 mEq  40 mEq Oral PRN Oksana Alejo MD        Or    potassium bicarb-citric acid (EFFER-K) effervescent tablet 40 mEq  40 mEq Oral PRN Oksana Alejo MD        Or    potassium chloride 10 mEq/100 mL IVPB (Peripheral Line)  10 mEq IntraVENous PRN Oksana Alejo MD        enoxaparin (LOVENOX) injection 40 mg  40 mg SubCUTAneous Daily Oksana Alejo MD        ondansetron (ZOFRAN-ODT) disintegrating tablet 4 mg  4 mg Oral Q8H PRN Oksana Alejo MD        Or    ondansetron [facility]) injection 4 mg  4 mg IntraVENous Q6H PRN Oksana Alejo MD        [facility]) tablet 8.6 mg  1 tablet Oral Daily PRN Oksana Alejo MD        acetaminophen (TYLENOL) tablet 650 mg  650 mg Oral Q6H PRN Jose Alfredo Farrell MD        Or   [facility] acetaminophen (TYLENOL) suppository 650 mg  650 mg Rectal Q6H PRN Jose Alfredo Farrell MD        albuterol sulfate  (90 Base) MCG/ACT inhaler 2 puff  2 puff Inhalation 4x Daily PRN Jose Alfredo Farrell MD        donepezil (ARICEPT) tablet 10 mg  10 mg Oral Nightly Jose Alfredo Farrell MD        escitalopram (LEXAPRO) tablet 15 mg  15 mg Oral Daily Jose Alfredo Farrell MD        metFORMIN (GLUCOPHAGE) tablet 500 mg  500 mg Oral BID  Jose Alfredo Farrell MD        metoprolol succinate (TOPROL XL) extended release tablet 25 mg  25 mg Oral Daily Jose Alfredo Farrell MD       [facility] Yanique Lund ON 12/23/2021] pantoprazole (PROTONIX) tablet 40 mg  40 mg Oral QAM AC Jose Alfredo Farrell MD        insulin lispro (HUMALOG) injection vial 0-12 Units  0-12 Units SubCUTAneous TID  Jose Alfredo Farrell MD        insulin lispro (HUMALOG) injection vial 0-6 Units  0-6 Units SubCUTAneous Nightly Jose Alfredo Farrell MD        glucose (GLUTOSE) 40 % oral gel 15 g  15 g Oral PRN Jose Alfredo Farrell MD        dextrose 50 % IV solution  12.5 g IntraVENous PRN Jose Alfredo Farrell MD        glucagon (rDNA) injection 1 mg  1 mg IntraMUSCular PRN Jose Alfredo Farrell MD        dextrose 5 % solution  100 mL/hr IntraVENous PRN Jose Alfredo Farrell MD         Current Outpatient Medications   Medication Sig Dispense Refill    omeprazole (PRILOSEC) 20 MG delayed release capsule Take 1 capsule by mouth every morning (before breakfast) 30 capsule 1    escitalopram (LEXAPRO) 10 MG tablet Take 1.5 tablets by mouth daily 45 tablet 5    donepezil (ARICEPT) 10 MG tablet Take 1 tablet by mouth nightly 90 tablet 3    nebivolol (BYSTOLIC) 2.5 MG tablet Take 3 tablets by mouth daily 270 tablet 3    metFORMIN (GLUCOPHAGE) 500 MG tablet Take 1 tablet by mouth 2 times daily (with meals) 60 tablet 5    potassium chloride (KLOR-CON M) 10 MEQ extended release tablet Take 1 tablet by mouth 3 times daily 90 tablet 5    pioglitazone (ACTOS) 30 MG tablet Take 1 tablet by mouth daily 30 tablet 3    albuterol sulfate HFA (VENTOLIN HFA) 108 (90 Base) MCG/ACT inhaler Inhale 2 puffs into the lungs 4 times daily as needed for Wheezing 1 Inhaler 1       Social History     Socioeconomic History    Marital status:      Spouse name: Not on file    Number of children: Not on file    Years of education: Not on file    Highest education level: Not on file   Occupational History    Not on file   Tobacco Use    Smoking status: Never Smoker    Smokeless tobacco: Never Used   Vaping Use    Vaping Use: Never used   Substance and Sexual Activity    Alcohol use: No    Drug use: No    Sexual activity: Not Currently     Partners: Male     Birth control/protection: Post-menopausal   Other Topics Concern    Not on file   Social History Narrative    Not on file     Social Determinants of Health     Financial Resource Strain:     Difficulty of Paying Living Expenses: Not on file   Food Insecurity:     Worried About Running Out of Food in the Last Year: Not on file    Alex of Food in the Last Year: Not on file   Transportation Needs:     Lack of Transportation (Medical): Not on file    Lack of Transportation (Non-Medical):  Not on file   Physical Activity:     Days of Exercise per Week: Not on file    Minutes of Exercise per Session: Not on file   Stress:     Feeling of Stress : Not on file   Social Connections:     Frequency of Communication with Friends and Family: Not on file    Frequency of Social Gatherings with Friends and Family: Not on file    Attends Voodoo Services: Not on file    Active Member of Clubs or Organizations: Not on file    Attends Club or Organization Meetings: Not on file    Marital Status: Not on file   Intimate Partner Violence:     Fear of Current or Ex-Partner: Not on file    Emotionally Abused: Not on file    Physically Abused: Not on file    Sexually Abused: Not on file   Housing Stability:     Unable to Pay for Housing in the Last Year: Not on file  Number of Places Lived in the Last Year: Not on file    Unstable Housing in the Last Year: Not on file       Family History   Problem Relation Age of Onset    Cancer Father        Review of Systems:   Heart: as above   Lungs: as above   Eyes: denies changes in vision or discharge. Ears: denies changes in hearing or pain. Nose: denies epistaxis or masses   Throat: denies sore throat or trouble swallowing. Neuro: denies numbness, tingling, tremors. Skin: denies rashes or itching. : denies hematuria, dysuria   GI: denies vomiting, diarrhea   Psych: denies mood changed, anxiety, depression. all others negative. Physical Exam   BP (!) 105/49   Pulse 64   Temp 97.7 °F (36.5 °C)   Resp 16   SpO2 99%   Constitutional: Oriented to person, place, and time. Well-developed and well-nourished. No distress. Head: Normocephalic and atraumatic. Eyes: EOM are normal. Pupils are equal, round, and reactive to light. Neck: Normal range of motion. Neck supple. No hepatojugular reflux and no JVD present. Carotid bruit is not present. No tracheal deviation present. No thyromegaly present. Cardiovascular: Normal rate, regular rhythm, normal heart sounds and intact distal pulses. Exam reveals no gallop and no friction rub. No murmur heard. Pulmonary/Chest: Effort normal and breath sounds normal. No respiratory distress. No wheezes. No rales. No tenderness. Abdominal: Soft. Bowel sounds are normal. No distension and no mass. No tenderness. No rebound and no guarding. Musculoskeletal: Normal range of motion. No edema and no tenderness. Lymphadenopathy:   No cervical adenopathy. No groin adenopathy. Neurological: Alert and oriented to person, place, and time. Skin: Skin is warm and dry. No rash noted. Not diaphoretic. No erythema. Psychiatric: Normal mood and affect.  Behavior is normal.     CBC:   Lab Results   Component Value Date    WBC 5.4 12/22/2021    RBC 3.48 12/22/2021    HGB 10.8 12/22/2021    HCT 33.6 12/22/2021    MCV 96.6 12/22/2021    MCH 31.0 12/22/2021    MCHC 32.1 12/22/2021    RDW 14.2 12/22/2021     12/22/2021    MPV 9.6 12/22/2021     BMP:   Lab Results   Component Value Date     12/22/2021    K 5.2 12/22/2021     12/22/2021    CO2 20 12/22/2021    BUN 29 12/22/2021    LABALBU 3.6 12/22/2021    LABALBU 3.7 03/29/2012    CREATININE 1.4 12/22/2021    CREATININE 1.0 11/07/2018    CALCIUM 9.6 12/22/2021    GFRAA 43 12/22/2021    LABGLOM 35 12/22/2021     Magnesium:    Lab Results   Component Value Date    MG 2.0 11/21/2010     Cardiac Enzymes:   Lab Results   Component Value Date    CKTOTAL 58 11/19/2010    CKTOTAL 71 11/18/2010    CKTOTAL 76 11/18/2010    CKMB 3.7 11/19/2010    CKMB 4.1 11/18/2010    CKMB 4.6 11/18/2010    TROPHS 26 (H) 06/13/2021      PT/INR:    Lab Results   Component Value Date    PROTIME 11.5 08/24/2011    INR 1.2 08/24/2011     TSH:    Lab Results   Component Value Date    TSH 2.290 05/12/2020     Rhythm Strip: normal sinus rhythm. EKG: Pending. Echocardiogram reviewed: 6/14/21   Normal left ventricular systolic function. Ejection fraction is visually estimated at 55%. Normal right ventricular size and function (TAPSE 1.9 cm). There is doppler evidence of stage I diastolic dysfunction. Mild aortic regurgitation. Physiologic and/or trace tricuspid regurgitation. PASP is estimated at 27 mmHg (normal estimated PASP).     ASSESSMENT AND PLAN:  Patient Active Problem List   Diagnosis    Cardiomyopathy, nonischemic (HCC)    Bundle branch block, left    Asthma    Biventricular implantable cardioverter-defibrillator in situ    CAD (coronary artery disease)    Diabetes 1.5, managed as type 2 ([facility] [address]    Patient in clinical research study    Insomnia due to medical condition    Late onset Alzheimer's disease with behavioral disturbance ([address]    PHOEBE (acute kidney injury) ([facility] [address]    Failure to thrive in adult    Mild protein-calorie malnutrition ([facility] [address]    Pelvic fracture ([facility] [address]     1. Pre-op Risk Stratification:    Patient is an acceptable risk to proceed. Okay to magnet ICD for procedure. 2. NICMP/Chronic Systolic CHF:     Cardiac cath (2010): showing minimal CAD. BB.    Monitor volume. 3. ICD: CRT-D in situ  Medtronic: DOI: 9/1/2011  ICD generator change: Medtronic CRT-D DOI: 5/9/2017    4. HTN: Observe. 5. Lipids    6. DM: Per primary service. 7. CKD: Follow labs. 8. Hx of Asthma    9. Hx of recurrent UTI    10. Dementia    Art Chavis D.O.   Cardiologist  Cardiology, [facility]

## 2021-12-22 NOTE — CONSULTS
Department of Orthopedic Surgery  Attending Consult Note        Reason for Consult:  Inability to ambulate  Requesting Physician:  Pramod Maraidaga    CHIEF COMPLAINT:  pain    History Obtained From:  patient, electronic medical record    HISTORY OF PRESENT ILLNESS:                The patient is a 80 y.o. female who presents with recent history of fall and decreased ability to ambulate. Past Medical History:        Diagnosis Date    Arthritis     Asthma     Biventricular implantable cardioverter-defibrillator in situ 9/10/2011    A. Medtronic Protecta XT CRT/D, model [id number], serial # [id number] implanted 9/1/11 B. Rght atrial lead is a Medtronic, model # [id number], serial # [id number].  Right ventricular lead is a Medtronic model # [id number], serial [id number]        CAD (coronary artery disease)     Diabetes mellitus ([address]     GERD (gastroesophageal reflux disease)     Hyperlipidemia     Hypertension     ICD (implantable cardiac defibrillator) battery depletion     Lafonda Demetrius (biventricular)    LBBB (left bundle branch block)     Nonischemic cardiomyopathy (HCC)      Past Surgical History:        Procedure Laterality Date    CARDIAC DEFIBRILLATOR PLACEMENT  09/01/2011    CARDIAC DEFIBRILLATOR PLACEMENT Left 05/09/2017    Bi-V gen change, Medtronic, WRAP-IT study,     CARPAL TUNNEL RELEASE      right arm    CATARACT REMOVAL WITH IMPLANT  2010    DIAGNOSTIC CARDIAC CATH LAB PROCEDURE  3/23/10    Dr. Mann Carbone    left eye     Current Medications:   Current Facility-Administered Medications: sodium chloride flush 0.9 % injection 10 mL, 10 mL, IntraVENous, 2 times per day  sodium chloride flush 0.9 % injection 10 mL, 10 mL, IntraVENous, PRN  0.9 % sodium chloride infusion, 25 mL, IntraVENous, PRN  potassium chloride (KLOR-CON M) extended release tablet 40 mEq, 40 mEq, Oral, PRN **OR** potassium bicarb-citric acid (EFFER-K) effervescent tablet 40 mEq, 40 mEq, Oral, PRN **OR** potassium chloride 10 mEq/100 mL IVPB (Peripheral Line), 10 mEq, IntraVENous, PRN  enoxaparin (LOVENOX) injection 40 mg, 40 mg, SubCUTAneous, Daily  ondansetron (ZOFRAN-ODT) disintegrating tablet 4 mg, 4 mg, Oral, Q8H PRN **OR** ondansetron (ZOFRAN) injection 4 mg, 4 mg, IntraVENous, Q6H PRN  senna (SENOKOT) tablet 8.6 mg, 1 tablet, Oral, Daily PRN  acetaminophen (TYLENOL) tablet 650 mg, 650 mg, Oral, Q6H PRN **OR** acetaminophen (TYLENOL) suppository 650 mg, 650 mg, Rectal, Q6H PRN  albuterol sulfate  (90 Base) MCG/ACT inhaler 2 puff, 2 puff, Inhalation, 4x Daily PRN  donepezil (ARICEPT) tablet 10 mg, 10 mg, Oral, Nightly  escitalopram (LEXAPRO) tablet 15 mg, 15 mg, Oral, Daily  metFORMIN (GLUCOPHAGE) tablet 500 mg, 500 mg, Oral, BID WC  metoprolol succinate (TOPROL XL) extended release tablet 25 mg, 25 mg, Oral, Daily  [START ON 12/23/2021] pantoprazole (PROTONIX) tablet 40 mg, 40 mg, Oral, QAM AC  insulin lispro (HUMALOG) injection vial 0-12 Units, 0-12 Units, SubCUTAneous, TID WC  insulin lispro (HUMALOG) injection vial 0-6 Units, 0-6 Units, SubCUTAneous, Nightly  glucose (GLUTOSE) 40 % oral gel 15 g, 15 g, Oral, PRN  dextrose 50 % IV solution, 12.5 g, IntraVENous, PRN  glucagon (rDNA) injection 1 mg, 1 mg, IntraMUSCular, PRN  dextrose 5 % solution, 100 mL/hr, IntraVENous, PRN  Allergies:  Codeine and Statins    Social History:   Assisted living  Family History:       Problem Relation Age of Onset    Cancer Father      REVIEW OF SYSTEMS:    noncontributory    PHYSICAL EXAM:    VITALS:  /70   Pulse 61   Temp 97.7 °F (36.5 °C) (Oral)   Resp 14   SpO2 98%    Pain with lateral pelvic compression  No pain with internal and external rotation  Distally neurovascularly intact    DATA:    Radiology Review:  Inferior and superior left rami fracture    IMPRESSION/RECOMMENDATIONS:    Inferior and superior left rami fracture    PT consult   consult   WBAT left lower extremity  F/U in 2 weeks upon  discharge

## 2021-12-22 NOTE — CARE COORDINATION
Called by ER for admission of patient who fell yesterday   Found to have pelvic fracture   ER discussed with ortho who will evaluate   Admission orders placed   Patient to be seen in morning by myself or Dr Marylou Ontiveros   Please call over night with any acute issues     Lashay Palma MD

## 2021-12-23 VITALS
DIASTOLIC BLOOD PRESSURE: 56 MMHG | TEMPERATURE: 98 F | HEART RATE: 68 BPM | SYSTOLIC BLOOD PRESSURE: 111 MMHG | BODY MASS INDEX: 27.24 KG/M2 | OXYGEN SATURATION: 97 % | WEIGHT: 139.5 LBS | RESPIRATION RATE: 16 BRPM

## 2021-12-23 LAB
ALBUMIN SERPL-MCNC: 3.5 G/DL (ref 3.5–5.2)
ALP BLD-CCNC: 112 U/L (ref 35–104)
ALT SERPL-CCNC: 5 U/L (ref 0–32)
ANION GAP SERPL CALCULATED.3IONS-SCNC: 11 MMOL/L (ref 7–16)
AST SERPL-CCNC: 15 U/L (ref 0–31)
BASOPHILS ABSOLUTE: 0.03 E9/L (ref 0–0.2)
BASOPHILS RELATIVE PERCENT: 0.6 % (ref 0–2)
BILIRUB SERPL-MCNC: 0.2 MG/DL (ref 0–1.2)
BUN BLDV-MCNC: 33 MG/DL (ref 6–23)
CALCIUM SERPL-MCNC: 9.6 MG/DL (ref 8.6–10.2)
CHLORIDE BLD-SCNC: 104 MMOL/L (ref 98–107)
CO2: 20 MMOL/L (ref 22–29)
CREAT SERPL-MCNC: 1.3 MG/DL (ref 0.5–1)
EKG ATRIAL RATE: 65 BPM
EKG P AXIS: 55 DEGREES
EKG Q-T INTERVAL: 468 MS
EKG QRS DURATION: 114 MS
EKG QTC CALCULATION (BAZETT): 486 MS
EKG R AXIS: 91 DEGREES
EKG T AXIS: 28 DEGREES
EKG VENTRICULAR RATE: 65 BPM
EOSINOPHILS ABSOLUTE: 0.12 E9/L (ref 0.05–0.5)
EOSINOPHILS RELATIVE PERCENT: 2.4 % (ref 0–6)
GFR AFRICAN AMERICAN: 47
GFR NON-AFRICAN AMERICAN: 39 ML/MIN/1.73
GLUCOSE BLD-MCNC: 103 MG/DL (ref 74–99)
HCT VFR BLD CALC: 31.9 % (ref 34–48)
HEMOGLOBIN: 10.1 G/DL (ref 11.5–15.5)
IMMATURE GRANULOCYTES #: 0.01 E9/L
IMMATURE GRANULOCYTES %: 0.2 % (ref 0–5)
LYMPHOCYTES ABSOLUTE: 2.19 E9/L (ref 1.5–4)
LYMPHOCYTES RELATIVE PERCENT: 43.1 % (ref 20–42)
MCH RBC QN AUTO: 31.1 PG (ref 26–35)
MCHC RBC AUTO-ENTMCNC: 31.7 % (ref 32–34.5)
MCV RBC AUTO: 98.2 FL (ref 80–99.9)
METER GLUCOSE: 106 MG/DL (ref 74–99)
METER GLUCOSE: 107 MG/DL (ref 74–99)
METER GLUCOSE: 154 MG/DL (ref 74–99)
METER GLUCOSE: 177 MG/DL (ref 74–99)
MONOCYTES ABSOLUTE: 0.54 E9/L (ref 0.1–0.95)
MONOCYTES RELATIVE PERCENT: 10.6 % (ref 2–12)
NEUTROPHILS ABSOLUTE: 2.19 E9/L (ref 1.8–7.3)
NEUTROPHILS RELATIVE PERCENT: 43.1 % (ref 43–80)
PDW BLD-RTO: 14.2 FL (ref 11.5–15)
PLATELET # BLD: 200 E9/L (ref 130–450)
PMV BLD AUTO: 9.9 FL (ref 7–12)
POTASSIUM REFLEX MAGNESIUM: 4.6 MMOL/L (ref 3.5–5)
RBC # BLD: 3.25 E12/L (ref 3.5–5.5)
SARS-COV-2, NAAT: NOT DETECTED
SODIUM BLD-SCNC: 135 MMOL/L (ref 132–146)
TOTAL PROTEIN: 6.1 G/DL (ref 6.4–8.3)
WBC # BLD: 5.1 E9/L (ref 4.5–11.5)

## 2021-12-23 PROCEDURE — 2580000003 HC RX 258: Performed by: STUDENT IN AN ORGANIZED HEALTH CARE EDUCATION/TRAINING PROGRAM

## 2021-12-23 PROCEDURE — 80053 COMPREHEN METABOLIC PANEL: CPT

## 2021-12-23 PROCEDURE — 36415 COLL VENOUS BLD VENIPUNCTURE: CPT

## 2021-12-23 PROCEDURE — 87635 SARS-COV-2 COVID-19 AMP PRB: CPT

## 2021-12-23 PROCEDURE — 82962 GLUCOSE BLOOD TEST: CPT

## 2021-12-23 PROCEDURE — G0378 HOSPITAL OBSERVATION PER HR: HCPCS

## 2021-12-23 PROCEDURE — 6370000000 HC RX 637 (ALT 250 FOR IP): Performed by: STUDENT IN AN ORGANIZED HEALTH CARE EDUCATION/TRAINING PROGRAM

## 2021-12-23 PROCEDURE — 97165 OT EVAL LOW COMPLEX 30 MIN: CPT

## 2021-12-23 PROCEDURE — 85025 COMPLETE CBC W/AUTO DIFF WBC: CPT

## 2021-12-23 PROCEDURE — 6360000002 HC RX W HCPCS: Performed by: STUDENT IN AN ORGANIZED HEALTH CARE EDUCATION/TRAINING PROGRAM

## 2021-12-23 PROCEDURE — 93010 ELECTROCARDIOGRAM REPORT: CPT | Performed by: INTERNAL MEDICINE

## 2021-12-23 PROCEDURE — 96372 THER/PROPH/DIAG INJ SC/IM: CPT

## 2021-12-23 RX ORDER — SENNA PLUS 8.6 MG/1
1 TABLET ORAL DAILY PRN
DISCHARGE
Start: 2021-12-23 | End: 2022-01-22

## 2021-12-23 RX ADMIN — METOPROLOL SUCCINATE 25 MG: 25 TABLET, EXTENDED RELEASE ORAL at 09:34

## 2021-12-23 RX ADMIN — METFORMIN HYDROCHLORIDE 500 MG: 500 TABLET ORAL at 09:34

## 2021-12-23 RX ADMIN — ESCITALOPRAM OXALATE 15 MG: 10 TABLET ORAL at 09:34

## 2021-12-23 RX ADMIN — SODIUM CHLORIDE, PRESERVATIVE FREE 10 ML: 5 INJECTION INTRAVENOUS at 09:35

## 2021-12-23 RX ADMIN — ACETAMINOPHEN 650 MG: 325 TABLET ORAL at 12:24

## 2021-12-23 RX ADMIN — INSULIN LISPRO 2 UNITS: 100 INJECTION, SOLUTION INTRAVENOUS; SUBCUTANEOUS at 12:24

## 2021-12-23 RX ADMIN — PANTOPRAZOLE SODIUM 40 MG: 40 TABLET, DELAYED RELEASE ORAL at 06:30

## 2021-12-23 RX ADMIN — ENOXAPARIN SODIUM 30 MG: 100 INJECTION SUBCUTANEOUS at 17:28

## 2021-12-23 RX ADMIN — INSULIN LISPRO 2 UNITS: 100 INJECTION, SOLUTION INTRAVENOUS; SUBCUTANEOUS at 16:32

## 2021-12-23 RX ADMIN — SODIUM CHLORIDE, PRESERVATIVE FREE 10 ML: 5 INJECTION INTRAVENOUS at 00:03

## 2021-12-23 ASSESSMENT — PAIN DESCRIPTION - PROGRESSION: CLINICAL_PROGRESSION: GRADUALLY WORSENING

## 2021-12-23 ASSESSMENT — PAIN DESCRIPTION - LOCATION: LOCATION: BACK

## 2021-12-23 ASSESSMENT — PAIN DESCRIPTION - ORIENTATION: ORIENTATION: LOWER

## 2021-12-23 ASSESSMENT — PAIN DESCRIPTION - DESCRIPTORS: DESCRIPTORS: ACHING;DISCOMFORT

## 2021-12-23 ASSESSMENT — PAIN SCALES - GENERAL
PAINLEVEL_OUTOF10: 4
PAINLEVEL_OUTOF10: 2

## 2021-12-23 ASSESSMENT — PAIN DESCRIPTION - ONSET: ONSET: GRADUAL

## 2021-12-23 ASSESSMENT — PAIN - FUNCTIONAL ASSESSMENT: PAIN_FUNCTIONAL_ASSESSMENT: PREVENTS OR INTERFERES SOME ACTIVE ACTIVITIES AND ADLS

## 2021-12-23 ASSESSMENT — PAIN DESCRIPTION - FREQUENCY: FREQUENCY: INTERMITTENT

## 2021-12-23 NOTE — CARE COORDINATION
Social Work:    [facility] at [address] [facility] accepted Mrs. Groves's referral for today. Physician ambulance was arranged to transport Mrs. Groves at 5:00 p.m. Social service updated patient's niece Bridger Hammer, as well as Mrs. Renny Wen.   (N-17, hens, ambulance completed)    Electronically signed by EVANS Daniels on 12/23/2021 at 2:06 PM

## 2021-12-23 NOTE — H&P
Internal Medicine History & Physical     Name: Jennifer Ugalde  : 1932  Chief Complaint: Leg Pain (sent from facility, pt denies pain on arrival. unsure if fell PTA)  Primary Care Physician: Cynthia Murdock MD  Admission date: 2021  Date of service: 2021     History of Present Illness  Julio Hamlin is a 80y.o. year old female. Past medical history includes arthritis, asthma, CAD, diabetes, hyperlipidemia, hypertension. History is difficult to obtain from patient as she is pleasantly confused. She tells me she does not know where she is or the year. She does not know why she is in the hospital.  It is reported that the patient suffered a mechanical fall on . She lives at a facility, although she tells me \"I think I live with my . \"  The patient been complaining of tailbone pain and was sent to the emergency department. The patient reports zero pain presently. Imaging revealed pelvic fracture. Orthopedic did see the patient and recommended weightbearing as tolerated to the left lower extremity without surgical intervention. She is to follow-up after 2 weeks from discharge. Social service notes reviewed and patient is to be transferred to SNF upon discharge. No family or friends present. History is limited from the patient because she is hard of hearing and has underlying dementia. ED course:   Initial blood work and imaging studies performed. Admission recommended by ED physician. Case discussed with ED provider by Dr. Eboni Garcia. Meds in ED consisted of the following: none     Past Medical History:   Diagnosis Date    Arthritis     Asthma     Biventricular implantable cardioverter-defibrillator in situ 9/10/2011    A. Medtronic Protecta XT CRT/D, model [id number], serial # [id number] implanted 11 B. Rght atrial lead is a Medtronic, model # [id number], serial # [id number].  Right ventricular lead is a Medtronic model # [id number], serial [id number]        CAD (coronary artery disease)     Diabetes mellitus ([facility]     GERD (gastroesophageal reflux disease)     Hyperlipidemia     Hypertension     ICD (implantable cardiac defibrillator) battery depletion     Cyndie Justin (biventricular)    LBBB (left bundle branch block)     Nonischemic cardiomyopathy (HCC)        Past Surgical History:   Procedure Laterality Date    CARDIAC DEFIBRILLATOR PLACEMENT  09/01/2011    CARDIAC DEFIBRILLATOR PLACEMENT Left 05/09/2017    Bi-V gen change, Medtronic, WRAP-IT study,     CARPAL TUNNEL RELEASE      right arm    CATARACT REMOVAL WITH IMPLANT  2010    DIAGNOSTIC CARDIAC CATH LAB PROCEDURE  3/23/10    Dr. Anu Herring    left eye       Family History   Problem Relation Age of Onset    Cancer Father        Social History  Patient lives at [facility]. Employment: none  Illicit drug use- denies  TOBACCO:   reports that she has never smoked. She has never used smokeless tobacco.  ETOH:   reports no history of alcohol use. Home Medications  Prior to Admission medications    Medication Sig Start Date End Date Taking?  Authorizing Provider   omeprazole (PRILOSEC) 20 MG delayed release capsule Take 1 capsule by mouth every morning (before breakfast) 11/26/21  Yes Taylor Ramos MD   escitalopram (LEXAPRO) 10 MG tablet Take 1.5 tablets by mouth daily 8/30/21  Yes Taylor Ramos MD   donepezil (ARICEPT) 10 MG tablet Take 1 tablet by mouth nightly 8/24/21  Yes Taylor Ramos MD   nebivolol (BYSTOLIC) 2.5 MG tablet Take 3 tablets by mouth daily 8/24/21  Yes Taylor Ramos MD   metFORMIN (GLUCOPHAGE) 500 MG tablet Take 1 tablet by mouth 2 times daily (with meals) 8/9/21  Yes Taylor Ramos MD   potassium chloride (KLOR-CON M) 10 MEQ extended release tablet Take 1 tablet by mouth 3 times daily 8/9/21  Yes Taylor Ramos MD   pioglitazone (ACTOS) 30 MG tablet Take 1 tablet by mouth daily 3/27/21  Yes Taylor Ramos MD   albuterol sulfate HFA (VENTOLIN HFA) 108 (90 Base) MCG/ACT inhaler Inhale 2 puffs into the lungs 4 times daily as needed for Wheezing 4/27/21   Telly Siegel MD       Allergies  Allergies   Allergen Reactions    Codeine     Statins        Review of Systems   Please see HPI above. All bolded are positive. All un-bolded are negative. Constitutional Symptoms: fever, chills, fatigue, generalized weakness, diaphoresis, increase in thirst, loss of appetite  Eyes: vision change   Ears, Nose, Mouth, Throat: hearing loss, nasal congestion, sores in the mouth  Cardiovascular: chest pain, chest heaviness, palpitations  Respiratory: shortness of breath, wheezing, coughing  Gastrointestinal: abdominal pain, nausea, vomiting, diarrhea, constipation, melena, hematochezia, hematemesis  Genitourinary: dysuria, hematuria, increased frequency  Musculoskeletal: lower extremity edema, myalgias, arthralgias, back pain  Integumentary: rashes, itching   Neurological: headache, lightheadedness, dizziness, confusion, syncope, numbness, tingling, focal weakness  Psychiatric: depression, suicidal ideation, anxiety  Endocrine: unintentional weight change  Hematologic/Lymphatic: lymphadenopathy, easy bruising, easy bleeding   Allergic/Immunologic: recurrent infections      Objective  VITALS:  BP (!) 118/57   Pulse 69   Temp 97.3 °F (36.3 °C) (Oral)   Resp 16   SpO2 95%     Physical Exam:   General: awake, alert, oriented to self only, pleasantly confused, appears stated age, cooperative, no acute distress, pleasant, appropriate mood  Eyes: conjunctivae/corneas clear, sclera non icteric, EOMI  Ears: hard of hearing, no obvious scars, no lesions, no masses, hearing intact  Mouth: mucous membranes moist, no obvious oral sores  Head: normocephalic, atraumatic  Neck: no JVD, no adenopathy, no thyromegaly, neck is supple, trachea is midline  Back: ROM normal, no CVA tenderness.   Chest: no pain on palpation  Lungs: clear to auscultation bilaterally, without rhonchi, crackle, RECOMMENDATIONS:   Unavailable         XR TIBIA FIBULA LEFT (2 VIEWS)   Final Result   No definite evidence for acute fracture or dislocation. However lucency at   the medial talar dome could represent osteochondral defect. If further   imaging needed MRI suggested. Heterogeneous osteopenia suggests osteoporosis. Moderate degenerative change medial joint space of the knee. There is also   chondrocalcinosis at the knee. XR HIP 2-3 VW W PELVIS LEFT   Final Result   No acute fracture is identified. XR FEMUR LEFT (MIN 2 VIEWS)   Final Result   No acute fracture is identified. XR KNEE LEFT (MIN 4 VIEWS)   Final Result   No acute osseous abnormality is identified. XR CHEST PORTABLE   Final Result   1. No radiographic evidence of an acute thoracic injury. 2. Increased prominence of the pulmonary vascular markings may be positional   or represent mild interval progression of CHF.              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 06/13/2021     Priority: Medium    Mild protein-calorie malnutrition ([facility] 06/14/2021    Late onset Alzheimer's disease with behavioral disturbance ([facility] 05/03/2021    Insomnia due to medical condition 05/07/2019    Patient in clinical research study 05/09/2017     WRAP-IT: Enrolled--5/9/2017  Closed      Diabetes 1.5, managed as type 2 ([facility] 03/28/2012    CAD (coronary artery disease)        A. Cardia catheterization (3/23/10 by Dr. Celso Sepulveda). D1 - 30-40%  Ramus - proximal 30-40%  RCA - ostial 40%      Biventricular implantable cardioverter-defibrillator in situ 09/10/2011     A. Medtronic Protecta XT CRT/D, model [id number], serial # [id number] implanted 9/1/11  B. Rght atrial lead is a Medtronic, model # [id number], serial # [id number]  C. Right ventricular lead is a Medtronic model # [id number], serial [id number]           Cardiomyopathy, nonischemic ([facility] 08/18/2011     A. S/P 2-D echocardiogram 6/16/11: LVEF 25%. Stage l diastolic dysfunction. Mild mitral, tricuspid and aortic regurgitation. Moderately increased LA volume  B. S/P 2=D echocardiogram 11/1910:  LVEF 35-40%  C. S/P adenosine stress test 11/20/10: no ischemia  D. S/P cardiac catheterization 11/23/10: LVEF 30%; minimal coronary artery disease  E. 2-12 EF improved to 60% by echo & 57% by MUGA in 3-12      Bundle branch block, left 08/18/2011    Asthma 08/18/2011       Plan  · Inferior and superior pubic ramus fractures after fall:   · Ortho consult - WBAT recommended  · Tylenol prn pain  · PT/OT   · H/o CHF:  · Continue home meds  · ICD in place  · Cardio following. · Continue home medications. · Follow labs  · DVT prophylaxis. · Please see orders for further management and care. ·  for discharge planning  · Discharge plan: SNF/RYAN likely to be needed     I have seen and examined this patient independently. The case has been discussed and the patient will be physically seen by Dr. Fritz Karimi as well.  All interventions have been agreed upon. LUCÍA Singh - NP , APRN, FNP-C  12/23/2021  8:58 AM    I can be reached through [employer]. NOTE:  This report was transcribed using voice recognition software. Every effort was made to ensure accuracy; however, inadvertent computerized transcription errors may be present. Addendum: I have personally participated in a face-to-face history and physical exam on the date of service with the patient. I have discussed the case with the nurse practitioner. I also participated in medical decision making on the date of service and I agree with all of the pertinent clinical information unless indicated in my editing of the note. I have reviewed and edited the note above based on my findings during my history, exam, and decision making on the same day of service. My additional thoughts:    DC planning to [address] for surgery   Weightbearing as tolerated per orthopedic surgery    Electronically signed by Ritchie Dubose DO on 12/23/2021 at 10:00 AM    I can be reached through [employer].

## 2021-12-23 NOTE — PROGRESS NOTES
[facility] Quality Flow/Interdisciplinary Rounds Progress Note        Quality Flow Rounds held on December 23, 2021    Disciplines Attending:  Bedside Nurse, ,  and Nursing Unit Sara Forrest Rachel Mejias was admitted on 12/22/2021 11:17 AM    Anticipated Discharge Date:  Expected Discharge Date: 12/31/21    Disposition:    Harman Score:  Harman Scale Score: 15    Readmission Risk              Risk of Unplanned Readmission:  19           Discussed patient goal for the day, patient clinical progression, and barriers to discharge.   The following Goal(s) of the Day/Commitment(s) have been identified:  Discharge 7680 Tj Vivas RN  December 23, 2021

## 2021-12-23 NOTE — CARE COORDINATION
Social Work:    Received a call from Amando Newell, patient's niece/POA. Amando Newell advised that her nephew is employed at [employer], which is now [employer], and she would prefer her aunt transfer there post hospital stay. Social service called a referral to Jeremy Heller at [facility] and she is reviewing patient's chart.     Electronically signed by EVANS Puente on 12/23/2021 at 8:33 AM

## 2021-12-23 NOTE — PROGRESS NOTES
Spoke with Ramon Meeks who will bring in medical POA tomorrow am. She would like [place] to be placed at [facility] and [address] [place] [address] in [place] if at all possible. You may contact her in the AM. She stated that the patient has been nonambulatory for 3 weeks following a fall at [facility].

## 2021-12-23 NOTE — PROGRESS NOTES
P Quality Flow/Interdisciplinary Rounds Progress Note        Quality Flow Rounds held on December 23, 2021    Disciplines Attending:  Bedside Nurse, ,  and Nursing Unit Sara Serrano was admitted on 12/22/2021 11:17 AM    Anticipated Discharge Date:  Expected Discharge Date: 12/31/21    Disposition:    Harman Score:  Harman Scale Score: 15    Readmission Risk              Risk of Unplanned Readmission:  19           Discussed patient goal for the day, patient clinical progression, and barriers to discharge.   The following Goal(s) of the Day/Commitment(s) have been identified:  Discharge 8253 Tj Vivas RN  December 23, 2021

## 2021-12-23 NOTE — PROGRESS NOTES
Krystyna Patel,    Your patient is on a medication that requires a renal dose adjustment. Renal Function Assessment:    Date Body Weight IBW Adj. Body Weight Scr CrCl Dialysis status   12/23/21 65.8 kg   24.8         Pharmacy has renally dose-adjusted the following medication(s):    Date Medication Original Dosing Regimen New Dosing Regimen   12/23/21 lovenox 40 mg daily 30 mg daily           These changes were made per protocol according to the [facility] Renal Function-Based Dose Adjustments Policy    *Please note this dose may need readjusted if your patient's renal function significantly improves. Please contact pharmacy will any questions regarding these changes.     Thank you,

## 2021-12-23 NOTE — PROGRESS NOTES
Per patients dtr, she states the family of patient does not care if Dr. Tonya Michaud or Dr. Carine Mueller do surgery. She is just hoping patient will have her surgery today.

## 2021-12-23 NOTE — PROGRESS NOTES
Occupational Therapy  OCCUPATIONAL THERAPY INITIAL EVALUATION     [address] [facility], [place]        EKQQ:                                                  Patient Name: Ayesha Greer    MRN: [id number]    : 1932    Room: [address] [place] [address]      Evaluating OT: Balbina Miller, OTR/L [id number]      Referring Provider: Madhuri Condon MD    Specific Provider Orders/Date: OT eval and treat 21      Diagnosis: Inferior pubic ramus fracture, left, closed, initial encounter [facility]) [S32.592A]  Closed nondisplaced fracture of pelvis, unspecified part of pelvis, initial encounter ([facility] [address] [S32. 9XXA]      Pertinent Medical History: arthritis, asthma, CAD, DM, GERD, HTN, cardiomyopathy, cardiac defibrillator placement       Precautions:  Fall Risk, WBAT LLE, alarm, confusion, [place]     Assessment of current deficits    [x] Functional mobility  [x]ADLs  [x] Strength               [x]Cognition    [x] Functional transfers   [x] IADLs         [x] Safety Awareness   [x]Endurance    [] Fine Coordination              [x] Balance      [] Vision/perception   []Sensation     []Gross Motor Coordination  [] ROM  [] Delirium                   [] Motor Control     OT PLAN OF CARE   OT POC based on physician orders, patient diagnosis and results of clinical assessment    Frequency/Duration 2-4 days/wk for 2 weeks PRN   Specific OT Treatment Interventions to include:   * Instruction/training on adapted ADL techniques and AE recommendations to increase functional independence within precautions       * Training on energy conservation strategies, correct breathing pattern and techniques to improve independence/tolerance for self-care routine  * Functional transfer/mobility training/DME recommendations for increased independence, safety, and fall prevention  * Patient/Family education to increase follow through with safety techniques and functional independence  * Recommendation of environmental modifications for increased safety with functional transfers/mobility and ADLs  * Cognitive retraining/development of therapeutic activities to improve problem solving, judgement, memory, and attention for increased safety/participation in ADL/IADL tasks  * Therapeutic exercise to improve motor endurance, ROM, and functional strength for ADLs/functional transfers  * Therapeutic activities to facilitate/challenge dynamic balance, stand tolerance for increased safety and independence with ADLs        Recommended Adaptive Equipment: TBD     Home Living: Pt is poor historian. Per chart, pt is from assisted living facility. Pain Level: Pt reported pain in L knee but did not rate. Pt agreeable to therapy. Cognition: A&O: oriented to self only. 1 step command follow demonstrated. Pt was pleasant but confused throughout session. Memory:  poor   Sequencing:  poor   Problem solving:  poor   Judgement/safety:  poor     Functional Assessment:  AM-PAC Daily Activity Raw Score: 13/24   Initial Eval Status  Date: 12/22/21 Treatment Status  Date: STGs = LTGs  Time frame: 10-14 days   Feeding Set up     Grooming Min A  Sup   UB Dressing Mod A  SBA   LB Dressing Max A to don socks and brief   Min A-with use of AD as appropriate/needed   Bathing Max A  Min A -with use of AD as appropriate/needed   Toileting Dep  Min A    Bed Mobility  Supine to sit: Mod A   Sit to supine:  Mod A   SBA   Functional Transfers Mod A with wheeled walker   Sit to stand from EOB  Stand to sit to chair  Cues for hand placement and safe technique  SBA   Functional Mobility Min A with wheeled walker  Short distance in room  Cues for safe wheeled walker management  SBA/Sup -with device as needed to maximize independence with ADLs and functional task completion   Balance Sitting:     Static:  Sup    Dynamic:SBA  Standing: Min A with wheeled walker  Sup for dynamic sitting balance to maximize independence with ADLs and functional task completion    SBA/Sup for standing balance to maximize independence with ADLs and functional task completion   Activity Tolerance Fair- with light activity. Pt limited by pain  Fair+ with ADL activity. Pt will demonstrate good understanding of education provided on EC/WS techniques    Visual/  Perceptual Glasses: none at bedside                Additional long-term goal: Pt will increase functional independence to PLOF to allow pt to live in least restrictive environment. Hand Dominance R   AROM (PROM) Strength Additional Info:    RUE  WFL WFL good  and wfl FMC/dexterity noted during ADL and functional tasks       LUE WFL WFL good  and wfl FMC/dexterity noted during ADL and functional tasks       Hearing: [place]  Sensation:  No c/o numbness or tingling  Tone: WFL   Edema: none noted    Comments: Upon arrival patient lying in bed. At end of session, patient sitting in chair with call light and phone within reach, all lines and tubes intact, and alarm. Overall patient demonstrated decreased independence and safety during completion of ADL/functional transfer/mobility tasks. Pt would benefit from continued skilled OT to increase safety and independence with completion of ADL/IADL tasks for functional independence and quality of life. Rehab Potential: Good for established goals     Patient / Family Goal: none stated    Patient and/or family were instructed on functional diagnosis, prognosis/goals and OT plan of care. Demonstrated poor understanding.      Eval Complexity: Low    Time In: 9030  Time Out: 0808    Min Units   OT Eval Low 97165  x  1   OT Eval Medium 78181      OT Eval High 05501      OT Re-Eval [id number]       Therapeutic Ex [id number]       Therapeutic Activities 20226       ADL/Self Care [id number]       Orthotic Management [id number]       Manual [id number]     Neuro Re-Ed [id number]       Non-Billable Time          Evaluation Time additionally includes thorough review of current medical information, gathering information on past medical history/social history and prior level of function, interpretation of standardized testing/informal observation of tasks, assessment of data and development of plan of care and goals.             Renetta Ahr, OTR/L, [id number]

## 2021-12-23 NOTE — PROGRESS NOTES
Physical Therapy    Facility/Department: [facility] SURGERY  Initial Assessment    NAME: Eva Bejarano  : 1932  MRN: [id number]     Date of Service: 2021       REQUIRES PT FOLLOW UP: Yes       Patient Diagnosis(es): The encounter diagnosis was Closed nondisplaced fracture of pelvis, unspecified part of pelvis, initial encounter ([facility] [address] has a past medical history of Arthritis, Asthma, Biventricular implantable cardioverter-defibrillator in situ, CAD (coronary artery disease), Diabetes mellitus ([facility] [address] GERD (gastroesophageal reflux disease), Hyperlipidemia, Hypertension, ICD (implantable cardiac defibrillator) battery depletion, LBBB (left bundle branch block), and Nonischemic cardiomyopathy ([facility] [address] has a past surgical history that includes Cataract removal with implant (); Retinal detachment surgery (); Carpal tunnel release; Diagnostic Cardiac Cath Lab Procedure (3/23/10); Cardiac defibrillator placement (2011); and Cardiac defibrillator placement (Left, 2017). Evaluating Therapist: Mateo Peterson PT     Referring Provider:  Chichi Fang MD    PT order : PT eval and treat     Room #:  115   DIAGNOSIS:  Inf pubic ramus fx     PRECAUTIONS: falls, WBAT     Social:  Pt lives at [facility] per chart   Prior to admission : pt unable to report. Ox 1      Initial Evaluation  Date:  2021  Treatment      Short Term/ Long Term   Goals   Was pt agreeable to Eval/treatment? yes      Does pt have pain? L knee      Bed Mobility  Rolling:  NT   Supine to sit:  Mod assist   Sit to supine: NT   Scooting: Mod assist in sit    CGA    Transfers Sit to stand:   Mod assist   Stand to sit:  Mod assist   Stand pivot:  NT    CGA    Ambulation     10  feet with ww  with  Min assist    50  feet with  ww  with  CGA        Stair negotiation: ascended and descended NT   N/A    LE ROM  AAROM WFL , decreased AROM L hip      LE strength  L hip 3-/ 5, otherwise 3+ to 4-/ 5  4-/ 5    AM- PAC RAW score   12/ 24            Pt is alert and Oriented x 1. Very forgetful. Balance:  Min assist, high fall risk due to pain and poor safety awareness   Endurance: limited by L knee /LE pain   Bed/Chair alarm: Yes      ASSESSMENT  Pt displays functional ability as noted in the objective portion of this evaluation. Conditions Requiring Skilled Therapeutic Intervention:    [x]Decreased strength     [x]Decreased ROM  [x]Decreased functional mobility  [x]Decreased balance   [x]Decreased endurance   [x]Decreased posture  []Decreased sensation  []Decreased coordination   []Decreased vision  [x]Decreased safety awareness   [x]Increased pain         Treatment/Education:    Pt in bed upon arrival. Has no idea where she is or why she is here. Very confused. Mobility as above with cues needed for technique with bed mobility; hand placement with transfers, and ww safety with gait. Pt educated on fall risk, safety with mobility        Patient response to education:   Pt verbalized understanding Pt demonstrated skill Pt requires further education in this area   no no Yes        Comments:  Pt left in chair after session, with call light in reach. Waffle cushion placed       Rehab potential is Good for reaching above PT goals. Pts/ family goals   1. None stated     Patient and or family understand(s) diagnosis, prognosis, and plan of care. -  No     PLAN  PT care will be provided in accordance with the objectives noted above. Whenever appropriate, clear delegation orders will be provided for nursing staff. Exercises and functional mobility practice will be used as well as appropriate assistive devices or modalities to obtain goals. Patient and family education will also be administered as needed.         PLAN OF CARE:    Current Treatment Recommendations     [x] Strengthening to improve independence with functional mobility   [x] ROM to improve independence with functional mobility   [x] Balance Training to improve static/dynamic balance and to reduce fall risk  [x] Endurance Training to improve activity tolerance during functional mobility   [x] Transfer Training to improve safety and independence with all functional transfers   [x] Gait Training to improve gait mechanics, endurance and assess need for appropriate assistive device  [] Stair Training in preparation for safe discharge home and/or into the community   [x] Positioning to prevent skin breakdown and contractures  [x] Safety and Education Training   [x] Patient/Caregiver Education   [] HEP  [] Other     Frequency of treatments will be daily x 3 days. Time in: 0752   Time out: 808       Evaluation Time includes thorough review of current medical information, gathering information on past medical history/social history and prior level of function, completion of standardized testing/informal observation of tasks, assessment of data and education on plan of care and goals.     CPT codes:  [x] Low Complexity PT evaluation 59424  [] Moderate Complexity PT evaluation 82580  [] High Complexity PT evaluation 91092  [] PT Re-evaluation 61173  [] Gait training 36137  minutes  [] Therapeutic activities 88401  minutes  [] Therapeutic exercises 57092 minutes  [] Neuromuscular reeducation 10212  minutes       Johny 18 number:  PT 5464

## 2021-12-24 LAB
EKG ATRIAL RATE: 68 BPM
EKG P AXIS: 26 DEGREES
EKG P-R INTERVAL: 160 MS
EKG Q-T INTERVAL: 450 MS
EKG QRS DURATION: 118 MS
EKG QTC CALCULATION (BAZETT): 478 MS
EKG R AXIS: 92 DEGREES
EKG T AXIS: -14 DEGREES
EKG VENTRICULAR RATE: 68 BPM

## 2021-12-24 NOTE — DISCHARGE SUMMARY
The patient was discharged on the same day as history and physical.  Please see history and physical as well as imaging studies, consult and evaluations, and nurse's notes.     Electronically signed by Olamide Ayers DO on 12/24/2021 at 7:08 AM

## 2021-12-26 RX ORDER — PIOGLITAZONEHYDROCHLORIDE 30 MG/1
30 TABLET ORAL DAILY
Qty: 30 TABLET | Refills: 5 | Status: SHIPPED
Start: 2021-12-26 | End: 2022-03-04 | Stop reason: SDUPTHER

## 2021-12-26 RX ORDER — OMEPRAZOLE 20 MG/1
20 CAPSULE, DELAYED RELEASE ORAL
Qty: 30 CAPSULE | Refills: 5 | Status: SHIPPED
Start: 2021-12-26 | End: 2022-03-04 | Stop reason: SDUPTHER

## 2021-12-27 NOTE — TELEPHONE ENCOUNTER
Spoke with nigamal Kat and she states that pt is in [place] in [place]. She had a hip fracture. She will call us back at a later date to schedule an appointment.

## 2022-01-06 ENCOUNTER — TELEPHONE (OUTPATIENT)
Dept: CARDIOLOGY CLINIC | Age: 87
End: 2022-01-06

## 2022-01-24 ENCOUNTER — CARE COORDINATION (OUTPATIENT)
Dept: CASE MANAGEMENT | Age: 87
End: 2022-01-24

## 2022-01-24 NOTE — CARE COORDINATION
[facility] [facility] Transitions Initial Follow Up Call    Call within 2 business days of discharge: Yes    Patient: [place] Patient : 1932   MRN: <[id number]>  Reason for Admission: Closed nondisplaced fx of pelvis  Discharge Date: 21 RARS: Readmission Risk Score: 15.1 ( )      Last Discharge [facility]       Complaint Diagnosis Description Type Department Provider    21 Leg Pain Closed nondisplaced fracture of pelvis, unspecified part of pelvis, initial encounter [facility]) ED to Hosp-Admission (Discharged) (ADMITTED) [address] DO; [address] .. Acute Care Course: Patient admitted to [facility] after a fall resulting in the above from -. She discharged to [facility] for rehab from - to an assisted living per [facility]. Request to  to obtain assisted living facility name and request a updated med list. Patient has access to 24 hour nursing. No further outreach    Follow up plan: Will monitor and resolve episode. Non-face-to-face services provided:      Care Transitions 24 Hour Call    Care Transitions Interventions         Follow Up  No future appointments.     Jaren Titus RN

## 2022-02-04 ENCOUNTER — OUTSIDE SERVICES (OUTPATIENT)
Dept: FAMILY MEDICINE CLINIC | Age: 87
End: 2022-02-04
Payer: MEDICARE

## 2022-02-04 DIAGNOSIS — U07.1 COVID: ICD-10-CM

## 2022-02-04 DIAGNOSIS — G47.01 INSOMNIA DUE TO MEDICAL CONDITION: ICD-10-CM

## 2022-02-04 DIAGNOSIS — R62.7 FAILURE TO THRIVE IN ADULT: ICD-10-CM

## 2022-02-04 DIAGNOSIS — Z95.810 BIVENTRICULAR IMPLANTABLE CARDIOVERTER-DEFIBRILLATOR IN SITU: ICD-10-CM

## 2022-02-04 DIAGNOSIS — F02.818 LATE ONSET ALZHEIMER'S DISEASE WITH BEHAVIORAL DISTURBANCE (HCC): ICD-10-CM

## 2022-02-04 DIAGNOSIS — I42.8 CARDIOMYOPATHY, NONISCHEMIC (HCC): ICD-10-CM

## 2022-02-04 DIAGNOSIS — E11.9 TYPE 2 DIABETES MELLITUS WITHOUT COMPLICATION, WITHOUT LONG-TERM CURRENT USE OF INSULIN (HCC): ICD-10-CM

## 2022-02-04 DIAGNOSIS — I44.7 BUNDLE BRANCH BLOCK, LEFT: ICD-10-CM

## 2022-02-04 DIAGNOSIS — K21.00 GASTROESOPHAGEAL REFLUX DISEASE WITH ESOPHAGITIS WITHOUT HEMORRHAGE: ICD-10-CM

## 2022-02-04 DIAGNOSIS — I25.10 CORONARY ARTERY DISEASE INVOLVING NATIVE CORONARY ARTERY OF NATIVE HEART WITHOUT ANGINA PECTORIS: ICD-10-CM

## 2022-02-04 DIAGNOSIS — G30.1 LATE ONSET ALZHEIMER'S DISEASE WITH BEHAVIORAL DISTURBANCE (HCC): ICD-10-CM

## 2022-02-04 DIAGNOSIS — S32.592A INFERIOR PUBIC RAMUS FRACTURE, LEFT, CLOSED, INITIAL ENCOUNTER (HCC): Primary | ICD-10-CM

## 2022-02-04 DIAGNOSIS — J45.20 MILD INTERMITTENT ASTHMA WITHOUT COMPLICATION: ICD-10-CM

## 2022-02-04 NOTE — PROGRESS NOTES
2/4/2022    [address]  9/21/1932    This resident is being seen today for new patient evaluation visit. She is a resident who has long-term medical conditions including inferior pubic rami fracture, Alzheimer's disease, type 2 diabetes mellitus, GERD, Covid, CAD, biventricular implantable cardioverter defibrillator, bundle branch block left, failure to thrive as an adult, insomnia, cardiomyopathy, and asthma. .  She is a 80 y.o. female resident who is being seen today for recent admission to the facility. Patient has recently had Covid and is experiencing significant weakness after the illness. She is noting that it is very difficult for her to perform her normal ADLs as well as ambulate to meals in the dining room and ambulate in the hallways. She feels that she would benefit from some assistance with physical therapies. .  Currently at this time she denies any complaints of chest pain or palpitations. Denies any headaches, any sore throat, or coughing. She does get somewhat short of breath with her ambulation. No nausea, vomiting, constipation or diarrhea. No pain in lower extremities. No fever, or chills. No recent falls or syncopal events. Allergies to iodine and statin therapies    Medications include Aricept 10 mg nightly, Lexapro 10 mg 1-1/2 tablets daily, Metformin 500 mg twice daily,Nebivolol 2.5 mg 3 tablets daily, omeprazole 20 mg daily, Actos 30 mg daily, potassium 10 mg 3 times daily, vitamin D 5000 units daily, zinc 220 mg daily, and albuterol HFA as needed, senna as needed. Objective     Vital Signs: Temperature 97.6 blood pressure 130/80 pulse 76 respirations 18 weight is 151 pounds    Physical examination:Skin is essentially warm and dry. HEENT unremarkable. Neck is supple. Heart regular rate and rhythm. No rubs, gallops or murmurs noted. Lungs are clear to auscultation. No evidence of rhonchi, rales, or wheezing. Abdomen is soft, supple and non-tender.   Bowel sounds are noted x4 quadrants. No rigidity, guarding or rebound tenderness. Negative Gutierrez's, negative McBurney's, negative Luis's. Extremities; no true pitting edema. Pulses are adequate. No clubbing  or no cyanosis noted. There are arthritic changes noted about the upper and lower extremities. Neurologically she  is alert and oriented x2. No evidence of paralysis or paresthesias noted. Diagnoses and all orders for this visit:    Inferior pubic ramus fracture, left, closed, initial encounter ([facility]    Late onset Alzheimer's disease with behavioral disturbance ([facility]    Type 2 diabetes mellitus without complication, without long-term current use of insulin (HCC)    Gastroesophageal reflux disease with esophagitis without hemorrhage    COVID    Coronary artery disease involving native coronary artery of native heart without angina pectoris    Biventricular implantable cardioverter-defibrillator in situ    Bundle branch block, left    Failure to thrive in adult    Insomnia due to medical condition    Cardiomyopathy, nonischemic (HCC)    Mild intermittent asthma without complication    I did review the patient's medications as well as labs. Patient is somewhat unsteady attempting to ambulate with a walker in her room. I will be ordering physical therapy to assist her in evaluation and treatment. We will continue with her remaining plan of care. Arlyn Duarte, APRN - CNP        *Note was creating using voice recognition software.   The document was reviewed however grammatical errors may exist.

## 2022-03-03 DIAGNOSIS — F43.21 GRIEF REACTION: ICD-10-CM

## 2022-03-03 RX ORDER — LORAZEPAM 0.5 MG/1
TABLET ORAL
Qty: 30 TABLET | Refills: 2 | Status: SHIPPED
Start: 2022-03-03 | End: 2022-05-31 | Stop reason: SDUPTHER

## 2022-03-04 DIAGNOSIS — F02.818 LATE ONSET ALZHEIMER'S DISEASE WITH BEHAVIORAL DISTURBANCE (HCC): ICD-10-CM

## 2022-03-04 DIAGNOSIS — I42.8 CARDIOMYOPATHY, NONISCHEMIC (HCC): ICD-10-CM

## 2022-03-04 DIAGNOSIS — F43.21 ADJUSTMENT DISORDER WITH DEPRESSED MOOD: ICD-10-CM

## 2022-03-04 DIAGNOSIS — E11.65 TYPE 2 DIABETES MELLITUS WITH HYPERGLYCEMIA, WITHOUT LONG-TERM CURRENT USE OF INSULIN (HCC): ICD-10-CM

## 2022-03-04 DIAGNOSIS — E13.9 DIABETES 1.5, MANAGED AS TYPE 2 (HCC): ICD-10-CM

## 2022-03-04 DIAGNOSIS — E11.9 TYPE 2 DIABETES MELLITUS WITHOUT COMPLICATION, WITHOUT LONG-TERM CURRENT USE OF INSULIN (HCC): ICD-10-CM

## 2022-03-04 DIAGNOSIS — G30.1 LATE ONSET ALZHEIMER'S DISEASE WITH BEHAVIORAL DISTURBANCE (HCC): ICD-10-CM

## 2022-03-04 RX ORDER — DONEPEZIL HYDROCHLORIDE 10 MG/1
10 TABLET, FILM COATED ORAL NIGHTLY
Qty: 31 TABLET | Refills: 5 | Status: SHIPPED
Start: 2022-03-04 | End: 2022-09-29 | Stop reason: SDUPTHER

## 2022-03-04 RX ORDER — ESCITALOPRAM OXALATE 10 MG/1
15 TABLET ORAL DAILY
Qty: 47 TABLET | Refills: 5 | Status: SHIPPED
Start: 2022-03-04 | End: 2022-10-18

## 2022-03-04 RX ORDER — NEBIVOLOL 2.5 MG/1
7.5 TABLET ORAL DAILY
Qty: 270 TABLET | Refills: 3 | Status: SHIPPED | OUTPATIENT
Start: 2022-03-04

## 2022-03-04 RX ORDER — OMEPRAZOLE 20 MG/1
20 CAPSULE, DELAYED RELEASE ORAL
Qty: 31 CAPSULE | Refills: 5 | Status: SHIPPED
Start: 2022-03-04 | End: 2022-08-31 | Stop reason: SDUPTHER

## 2022-03-04 RX ORDER — POTASSIUM CHLORIDE 750 MG/1
10 TABLET, EXTENDED RELEASE ORAL 3 TIMES DAILY
Qty: 93 TABLET | Refills: 5 | Status: SHIPPED
Start: 2022-03-04 | End: 2022-08-31 | Stop reason: SDUPTHER

## 2022-03-04 RX ORDER — PIOGLITAZONEHYDROCHLORIDE 30 MG/1
30 TABLET ORAL DAILY
Qty: 31 TABLET | Refills: 5 | Status: SHIPPED | OUTPATIENT
Start: 2022-03-04

## 2022-03-10 DIAGNOSIS — S72.001A CLOSED FRACTURE OF RIGHT HIP, INITIAL ENCOUNTER (HCC): Primary | ICD-10-CM

## 2022-03-10 RX ORDER — HYDROCODONE BITARTRATE AND ACETAMINOPHEN 5; 325 MG/1; MG/1
1 TABLET ORAL EVERY 6 HOURS PRN
Qty: 28 TABLET | Refills: 0 | Status: SHIPPED
Start: 2022-03-10 | End: 2022-03-11 | Stop reason: SDUPTHER

## 2022-03-11 DIAGNOSIS — S72.001A CLOSED FRACTURE OF RIGHT HIP, INITIAL ENCOUNTER (HCC): ICD-10-CM

## 2022-03-11 RX ORDER — HYDROCODONE BITARTRATE AND ACETAMINOPHEN 5; 325 MG/1; MG/1
1 TABLET ORAL EVERY 8 HOURS PRN
Qty: 14 TABLET | Refills: 0 | Status: SHIPPED | OUTPATIENT
Start: 2022-03-11 | End: 2022-03-16

## 2022-03-22 RX ORDER — CHOLECALCIFEROL (VITAMIN D3) 125 MCG
2000 CAPSULE ORAL
COMMUNITY
End: 2022-03-22 | Stop reason: SDUPTHER

## 2022-03-23 RX ORDER — CHOLECALCIFEROL (VITAMIN D3) 125 MCG
2000 CAPSULE ORAL DAILY
Qty: 31 TABLET | Refills: 5 | Status: SHIPPED | OUTPATIENT
Start: 2022-03-23 | End: 2022-04-23

## 2022-03-23 RX ORDER — ASPIRIN 81 MG/1
81 TABLET ORAL DAILY
Qty: 31 TABLET | Refills: 5 | Status: SHIPPED | OUTPATIENT
Start: 2022-03-23 | End: 2022-07-27

## 2022-03-28 ENCOUNTER — OUTSIDE SERVICES (OUTPATIENT)
Dept: FAMILY MEDICINE CLINIC | Age: 87
End: 2022-03-28
Payer: MEDICARE

## 2022-03-28 DIAGNOSIS — F03.90 DEMENTIA WITHOUT BEHAVIORAL DISTURBANCE, UNSPECIFIED DEMENTIA TYPE: ICD-10-CM

## 2022-03-28 DIAGNOSIS — I25.10 CORONARY ARTERY DISEASE INVOLVING NATIVE CORONARY ARTERY OF NATIVE HEART WITHOUT ANGINA PECTORIS: ICD-10-CM

## 2022-03-28 DIAGNOSIS — R62.7 FAILURE TO THRIVE IN ADULT: ICD-10-CM

## 2022-03-28 DIAGNOSIS — K21.00 GASTROESOPHAGEAL REFLUX DISEASE WITH ESOPHAGITIS WITHOUT HEMORRHAGE: ICD-10-CM

## 2022-03-28 DIAGNOSIS — S72.001A CLOSED FRACTURE OF RIGHT HIP, INITIAL ENCOUNTER (HCC): Primary | ICD-10-CM

## 2022-03-28 NOTE — PROGRESS NOTES
3/28/2022    [address]  9/21/1932    This resident is being seen today for an acute evaluation visit. She is a resident who has long-term medical conditions including CPK pending. failure to thrive, arteriosclerotic heart disease, COVID-19, GERD, diabetes mellitus, dementia, Alzheimer's, pelvic fracture. She is a 80 y.o. female resident who is being seen today for an acute evaluation visit per staffing request due to concerns of ongoing pain with which they have requested that she have routine Tylenol. This is a resident who recently underwent a ORIF with Dr. Gualberto Elizabeth due to a right hip fracture. Her pain has been relatively controlled which again she does ask for Tylenol quite often. She denies any headaches or dizziness, sore throat, chest pain, coughing or shortness of breath, nausea or vomiting, constipation or diarrhea, dysuria or frequency, fever or chills, falls or syncopal events. Medications:  Donepezil 10 mg at bedtime  Escitalopram 15 milligrams daily   Metformin 500 mg twice daily  Nebivolol 7.5 mg daily  Omeprazole 20 mg daily  Pioglitazone 30 mg daily  Potassium chloride 20 mEq 3 times daily        Objective     Vital Signs: weight 151 pounds    Physical examination:Skin is essentially warm and dry. HEENT unremarkable. Neck is supple. Heart regular rate and rhythm. No rubs, gallops or murmurs noted. Lungs are clear to auscultation. No evidence of rhonchi, rales, or wheezing. Abdomen is soft, supple and non-tender. Bowel sounds are noted x4 quadrants. No rigidity, guarding or rebound tenderness. Negative Gutierrez's, negative McBurney's, negative Luis's. Extremities; no true pitting edema. Pulses are adequate. No clubbing  or no cyanosis noted. Neurologically she  is alert and oriented x2. No evidence of paralysis or paresthesias noted.     Diagnoses and all orders for this visit:    Closed fracture of right hip, initial encounter [facility])  Comments:  Status post ORIF with recommendations for Tylenol 650 mg twice daily    Gastroesophageal reflux disease with esophagitis without hemorrhage  Comments:  Controlled with omeprazole    Dementia without behavioral disturbance, unspecified dementia type ([facility]  Comments:  Stable with donepezil    Failure to thrive in adult  Comments:  Fairly stable at this time    Coronary artery disease involving native coronary artery of native heart without angina pectoris  Comments:  Maintain lipid and blood pressure management          Plan:  Plan of care was discussed with the healthcare team with medications and labs reviewed. I did review labs from January with a BUN/creatinine 20/1.13 with a GFR 45 lipid panel within normal limits TSH 2.230 H/H 10.3/32 vitamin D 37.6. Regarding this resident's current status and as per staffing request, I am going to initiate the Tylenol 650 mg twice a day and then monitor her with respect to pain management. She will furthermore continue with her current medical management as otherwise indicated and I will see her routinely and as needed with further orders forthcoming. JOSE WILLIS, APRN - CNP      *Note was creating using voice recognition software.   The document was reviewed however grammatical errors may exist.

## 2022-04-04 ENCOUNTER — HOSPITAL ENCOUNTER (OUTPATIENT)
Dept: HOSPITAL 83 - ORTHO | Age: 87
Discharge: HOME | End: 2022-04-04
Attending: ORTHOPAEDIC SURGERY
Payer: COMMERCIAL
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
## 2022-04-04 DIAGNOSIS — S72.144D: Primary | ICD-10-CM

## 2022-04-04 DIAGNOSIS — S72.001A CLOSED FRACTURE OF RIGHT HIP, INITIAL ENCOUNTER (HCC): Primary | ICD-10-CM

## 2022-04-04 RX ORDER — HYDROCODONE BITARTRATE AND ACETAMINOPHEN 5; 325 MG/1; MG/1
1 TABLET ORAL EVERY 8 HOURS PRN
COMMUNITY
End: 2022-04-04 | Stop reason: SDUPTHER

## 2022-04-04 RX ORDER — HYDROCODONE BITARTRATE AND ACETAMINOPHEN 5; 325 MG/1; MG/1
1 TABLET ORAL EVERY 8 HOURS PRN
Qty: 90 TABLET | Refills: 0 | Status: SHIPPED | OUTPATIENT
Start: 2022-04-04 | End: 2022-05-04

## 2022-04-06 LAB
AMMONIA: 20 UMOL/L (ref 11–32)
VITAMIN B-12: 231 PG/ML (ref 247–911)

## 2022-04-07 ENCOUNTER — TELEPHONE (OUTPATIENT)
Dept: FAMILY MEDICINE CLINIC | Age: 87
End: 2022-04-07

## 2022-04-07 NOTE — TELEPHONE ENCOUNTER
I talked to [facility], she said to put pt. On Vit B 12 1000 mcg/week. [facility].

## 2022-04-07 NOTE — TELEPHONE ENCOUNTER
----- Message from Burnie Saint, DO sent at 4/6/2022  5:57 PM EDT -----  B 12 gale anderson treating please let justin know on this

## 2022-04-19 RX ORDER — ACETAMINOPHEN 325 MG/1
650 TABLET ORAL
COMMUNITY
End: 2022-04-19 | Stop reason: SDUPTHER

## 2022-04-19 RX ORDER — ACETAMINOPHEN 325 MG/1
TABLET ORAL
Qty: 120 TABLET | Refills: 5 | Status: SHIPPED
Start: 2022-04-19 | End: 2022-04-29 | Stop reason: SDUPTHER

## 2022-04-26 ENCOUNTER — OUTSIDE SERVICES (OUTPATIENT)
Dept: FAMILY MEDICINE CLINIC | Age: 87
End: 2022-04-26
Payer: MEDICARE

## 2022-04-26 DIAGNOSIS — R63.0 DECREASED APPETITE: ICD-10-CM

## 2022-04-26 DIAGNOSIS — F02.818 LATE ONSET ALZHEIMER'S DISEASE WITH BEHAVIORAL DISTURBANCE (HCC): ICD-10-CM

## 2022-04-26 DIAGNOSIS — H10.33 ACUTE CONJUNCTIVITIS OF BOTH EYES, UNSPECIFIED ACUTE CONJUNCTIVITIS TYPE: Primary | ICD-10-CM

## 2022-04-26 DIAGNOSIS — G30.1 LATE ONSET ALZHEIMER'S DISEASE WITH BEHAVIORAL DISTURBANCE (HCC): ICD-10-CM

## 2022-04-26 DIAGNOSIS — M15.9 PRIMARY OSTEOARTHRITIS INVOLVING MULTIPLE JOINTS: ICD-10-CM

## 2022-04-26 DIAGNOSIS — R60.9 PERIPHERAL EDEMA: ICD-10-CM

## 2022-04-26 NOTE — PROGRESS NOTES
4/26/2022    [address]  9/21/1932    This resident is being seen today for a follow-up evaluation visit. She is a resident who has long-term medical conditions including CPK pending. failure to thrive, arteriosclerotic heart disease, COVID-19, GERD, diabetes mellitus, dementia, Alzheimer's, pelvic fracture. She is a 80 y.o. female resident who is being seen today for a wellness evaluation visit with concern regarding her bilateral eyes. She does appear to have some degree of a discharge with matting to the bilateral eyes with the right being worse than the left. Also, Staff indicates that she has had lack of appetite over the course of the past couple of days. Resident does reside in the memory care unit here at the facility and does indicate that she has some discomfort to her right eye. She denies any headaches or dizziness, sore throat, chest pain, coughing or shortness of breath, nausea or vomiting, constipation or diarrhea, dysuria or frequency, fever or chills, falls or syncopal events. Medications:  Acetaminophen 650 mg twice daily  Aspirin 81 mg daily  Ativan 0.5 mg twice daily  Ativan 0.5 mg every 8 hours as needed  Donepezil 10 mg at bedtime  Escitalopram 15 milligrams daily   Metformin 500 mg twice daily  Nebivolol 7.5 mg daily  Omeprazole 20 mg daily  Vitamin B12 1000 mcg weekly      Objective     Vital Signs: Blood pressure 117/74 pulse 80 respirations 18 temperature 97.6 weight 151 pounds     Physical examination:Skin is essentially warm and dry. HEENT with some evidence of green mattery discharge with the right worse than the left but otherwise unremarkable. Neck is supple. Heart regular rate and rhythm. No rubs, gallops or murmurs noted. Lungs are clear to auscultation. No evidence of rhonchi, rales, or wheezing. Abdomen is soft, supple and non-tender. Bowel sounds are noted x4 quadrants. No rigidity, guarding or rebound tenderness.   Negative Gutierrez's, negative McBurney's, negative Luis's. Extremities; mild to 1+ edema. Pulses are adequate. No clubbing  or no cyanosis noted. Neurologically she  is alert and oriented x2. No evidence of paralysis or paresthesias noted. Diagnoses and all orders for this visit:    Acute conjunctivitis of both eyes, unspecified acute conjunctivitis type  Comments:  Initiate baby shampoo lid scrubs daily for 1 week along with Ocuflox drops x5 days    Peripheral edema  Comments:  Stable with observation with offloading    Decreased appetite  Comments:  Monitor accordingly and reevaluate if ongoing concern or weight loss    Late onset Alzheimer's disease with behavioral disturbance (HCC)  Comments:  Stable with donepezil    Primary osteoarthritis involving multiple joints  Comments:  Stable with vitamin supplementation along with Tylenol for analgesic therapy          Plan:  Plan of care was discussed with the healthcare team with medications and labs reviewed. BUN/creatinine 20/1.13 with a GFR 45 lipids within normal limits TSH 2.230H/H10.3/32 vitamin D of 37.6. Regarding her concern of her bilateral eyes, I am been to recommend baby shampoo lid scrubs daily for the course of 1 week and then daily thereafter on an as-needed basis. Furthermore, I will recommend Ocuflox 0.3% with 1 drop 3 times a day over the course of the next 5 days. Staff will continue to monitor her in terms of her symptomatology with reevaluation recommended if no improvement. She will otherwise continue forth with her current medical regimen as directed and I will continue to track her intakes, monitor her weights and behaviors, and see her routinely and as needed with further orders forthcoming. JOSE WILLIS, APRN - CNP      *Note was creating using voice recognition software.   The document was reviewed however grammatical errors may exist.

## 2022-04-27 LAB
AMORPH SEDIMENT: ABNORMAL
BACTERIA, URINE: ABNORMAL
BILIRUBIN: NEGATIVE
BLOOD: NEGATIVE
CLARITY: CLEAR
COLOR: YELLOW
EPITHELIAL CELLS, UA: ABNORMAL
GLUCOSE: NEGATIVE
GRANULAR CASTS, COARSE: ABNORMAL
KETONES: ABNORMAL
LEUKOCYTE ESTERASE, URINE: ABNORMAL
NITRITE, URINE: NEGATIVE
PH, URINE: 5.5 (ref 4.5–8)
PROTEIN UA: ABNORMAL
SPECIFIC GRAVITY UA: 1.02 (ref 1–1.03)
UROBILINOGEN, URINE: 1 E.U./DL (ref 0–1)
WBC URINE: ABNORMAL WBC/HPF (ref 0–5)

## 2022-04-29 ENCOUNTER — HOSPITAL ENCOUNTER (INPATIENT)
Dept: HOSPITAL 83 - ED | Age: 87
LOS: 5 days | Discharge: NURSING, REHAB OR LONG-TERM CARE | DRG: 640 | End: 2022-05-04
Attending: STUDENT IN AN ORGANIZED HEALTH CARE EDUCATION/TRAINING PROGRAM | Admitting: STUDENT IN AN ORGANIZED HEALTH CARE EDUCATION/TRAINING PROGRAM
Payer: COMMERCIAL

## 2022-04-29 VITALS — HEIGHT: 61 IN | BODY MASS INDEX: 28.74 KG/M2 | WEIGHT: 152.2 LBS

## 2022-04-29 VITALS — DIASTOLIC BLOOD PRESSURE: 64 MMHG | SYSTOLIC BLOOD PRESSURE: 153 MMHG

## 2022-04-29 VITALS — DIASTOLIC BLOOD PRESSURE: 62 MMHG | SYSTOLIC BLOOD PRESSURE: 140 MMHG

## 2022-04-29 VITALS — DIASTOLIC BLOOD PRESSURE: 63 MMHG

## 2022-04-29 VITALS — DIASTOLIC BLOOD PRESSURE: 70 MMHG | SYSTOLIC BLOOD PRESSURE: 140 MMHG

## 2022-04-29 DIAGNOSIS — E87.8: ICD-10-CM

## 2022-04-29 DIAGNOSIS — N18.31: ICD-10-CM

## 2022-04-29 DIAGNOSIS — E11.65: ICD-10-CM

## 2022-04-29 DIAGNOSIS — Z79.4: ICD-10-CM

## 2022-04-29 DIAGNOSIS — Z20.822: ICD-10-CM

## 2022-04-29 DIAGNOSIS — K21.9: ICD-10-CM

## 2022-04-29 DIAGNOSIS — E83.42: Primary | ICD-10-CM

## 2022-04-29 DIAGNOSIS — N17.0: ICD-10-CM

## 2022-04-29 DIAGNOSIS — Z95.0: ICD-10-CM

## 2022-04-29 DIAGNOSIS — G93.41: ICD-10-CM

## 2022-04-29 DIAGNOSIS — E11.22: ICD-10-CM

## 2022-04-29 DIAGNOSIS — F03.90: ICD-10-CM

## 2022-04-29 DIAGNOSIS — D64.9: ICD-10-CM

## 2022-04-29 LAB
ALP SERPL-CCNC: 78 U/L (ref 45–117)
ALT SERPL W P-5'-P-CCNC: 10 U/L (ref 12–78)
AMPHETAMINES UR QL SCN: < 1000
AST SERPL-CCNC: 18 IU/L (ref 3–35)
BACTERIA #/AREA URNS HPF: (no result) /[HPF]
BARBITURATES UR QL SCN: < 200
BASOPHILS # BLD AUTO: 0 10*3/UL (ref 0–0.1)
BASOPHILS NFR BLD AUTO: 0.4 % (ref 0–1)
BENZODIAZ UR QL SCN: < 200
BUN SERPL-MCNC: 22 MG/DL (ref 7–24)
BZE UR QL SCN: < 300
CANNABINOIDS UR QL SCN: < 50
CHLORIDE SERPL-SCNC: 109 MMOL/L (ref 98–107)
CREAT SERPL-MCNC: 1.27 MG/DL (ref 0.55–1.02)
EOSINOPHIL # BLD AUTO: 0.1 10*3/UL (ref 0–0.4)
EOSINOPHIL # BLD AUTO: 2.4 % (ref 1–4)
EPI CELLS #/AREA URNS HPF: (no result) /[HPF]
ERYTHROCYTE [DISTWIDTH] IN BLOOD BY AUTOMATED COUNT: 16.3 % (ref 0–14.5)
HCT VFR BLD AUTO: 33.5 % (ref 37–47)
LEUKOCYTE ESTERASE UR QL STRIP: (no result)
LYMPHOCYTES # BLD AUTO: 1.7 10*3/UL (ref 1.3–4.4)
LYMPHOCYTES NFR BLD AUTO: 38 % (ref 27–41)
MCH RBC QN AUTO: 30.9 PG (ref 27–31)
MCHC RBC AUTO-ENTMCNC: 31.6 G/DL (ref 33–37)
MCV RBC AUTO: 97.7 FL (ref 81–99)
METHADONE UR QL SCN: < 300
MONOCYTES # BLD AUTO: 0.4 10*3/UL (ref 0.1–1)
MONOCYTES NFR BLD MANUAL: 9.6 % (ref 3–9)
NEUT #: 2.3 10*3/UL (ref 2.3–7.9)
NEUT %: 49.4 % (ref 47–73)
NRBC BLD QL AUTO: 0 % (ref 0–0)
OPIATES UR QL SCN: < 300
PCP UR QL SCN: <  25
PH UR STRIP: 5 [PH] (ref 4.5–8)
PLATELET # BLD AUTO: 203 10*3/UL (ref 130–400)
PMV BLD AUTO: 9.9 FL (ref 9.6–12.3)
POTASSIUM SERPL-SCNC: 4.8 MMOL/L (ref 3.5–5.1)
PROT SERPL-MCNC: 6.7 GM/DL (ref 6.4–8.2)
RBC # BLD AUTO: 3.43 10*6/UL (ref 4.1–5.1)
SODIUM SERPL-SCNC: 138 MMOL/L (ref 136–145)
SP GR UR: 1.02 (ref 1–1.03)
URINE CULTURE, ROUTINE: NORMAL
UROBILINOGEN UR STRIP-MCNC: 1 E.U./DL (ref 0–1)
WBC #/AREA URNS HPF: (no result) WBC/HPF (ref 0–5)
WBC NRBC COR # BLD AUTO: 4.6 10*3/UL (ref 4.8–10.8)

## 2022-04-29 RX ORDER — ACETAMINOPHEN 325 MG/1
TABLET ORAL
Qty: 124 TABLET | Refills: 5 | Status: SHIPPED
Start: 2022-04-29 | End: 2022-10-18

## 2022-04-30 VITALS — SYSTOLIC BLOOD PRESSURE: 144 MMHG | DIASTOLIC BLOOD PRESSURE: 53 MMHG

## 2022-04-30 VITALS — DIASTOLIC BLOOD PRESSURE: 71 MMHG

## 2022-04-30 VITALS — DIASTOLIC BLOOD PRESSURE: 58 MMHG

## 2022-04-30 VITALS — DIASTOLIC BLOOD PRESSURE: 50 MMHG

## 2022-04-30 VITALS — DIASTOLIC BLOOD PRESSURE: 73 MMHG | SYSTOLIC BLOOD PRESSURE: 156 MMHG

## 2022-04-30 LAB
25(OH)D3 SERPL-MCNC: 43.6 NG/ML (ref 30–100)
ALP SERPL-CCNC: 65 U/L (ref 45–117)
ALT SERPL W P-5'-P-CCNC: 8 U/L (ref 12–78)
AST SERPL-CCNC: 16 IU/L (ref 3–35)
BASOPHILS # BLD AUTO: 0 10*3/UL (ref 0–0.1)
BASOPHILS NFR BLD AUTO: 0.6 % (ref 0–1)
BUN SERPL-MCNC: 19 MG/DL (ref 7–24)
CHLORIDE SERPL-SCNC: 108 MMOL/L (ref 98–107)
CREAT SERPL-MCNC: 1.04 MG/DL (ref 0.55–1.02)
EOSINOPHIL # BLD AUTO: 0.2 10*3/UL (ref 0–0.4)
EOSINOPHIL # BLD AUTO: 4.9 % (ref 1–4)
ERYTHROCYTE [DISTWIDTH] IN BLOOD BY AUTOMATED COUNT: 16.2 % (ref 0–14.5)
HCT VFR BLD AUTO: 32.8 % (ref 37–47)
LYMPHOCYTES # BLD AUTO: 1.9 10*3/UL (ref 1.3–4.4)
LYMPHOCYTES NFR BLD AUTO: 39.3 % (ref 27–41)
MCH RBC QN AUTO: 30.4 PG (ref 27–31)
MCHC RBC AUTO-ENTMCNC: 31.7 G/DL (ref 33–37)
MCV RBC AUTO: 95.9 FL (ref 81–99)
MONOCYTES # BLD AUTO: 0.5 10*3/UL (ref 0.1–1)
MONOCYTES NFR BLD MANUAL: 9.6 % (ref 3–9)
NEUT #: 2.2 10*3/UL (ref 2.3–7.9)
NEUT %: 45.4 % (ref 47–73)
NRBC BLD QL AUTO: 0 10*3/UL (ref 0–0)
PLATELET # BLD AUTO: 181 10*3/UL (ref 130–400)
PMV BLD AUTO: 10.1 FL (ref 9.6–12.3)
POTASSIUM SERPL-SCNC: 4.5 MMOL/L (ref 3.5–5.1)
PROT SERPL-MCNC: 6.2 GM/DL (ref 6.4–8.2)
RBC # BLD AUTO: 3.42 10*6/UL (ref 4.1–5.1)
SODIUM SERPL-SCNC: 137 MMOL/L (ref 136–145)
TSH SERPL DL<=0.005 MIU/L-ACNC: 1.35 UIU/ML (ref 0.36–4.75)
VITAMIN B12: 511 PG/ML (ref 247–911)
WBC NRBC COR # BLD AUTO: 4.9 10*3/UL (ref 4.8–10.8)

## 2022-05-01 VITALS — DIASTOLIC BLOOD PRESSURE: 50 MMHG

## 2022-05-01 VITALS — DIASTOLIC BLOOD PRESSURE: 63 MMHG

## 2022-05-01 VITALS — DIASTOLIC BLOOD PRESSURE: 74 MMHG

## 2022-05-01 VITALS — SYSTOLIC BLOOD PRESSURE: 150 MMHG | DIASTOLIC BLOOD PRESSURE: 67 MMHG

## 2022-05-01 VITALS — DIASTOLIC BLOOD PRESSURE: 51 MMHG

## 2022-05-01 LAB
ALP SERPL-CCNC: 70 U/L (ref 45–117)
ALT SERPL W P-5'-P-CCNC: 12 U/L (ref 12–78)
AST SERPL-CCNC: 16 IU/L (ref 3–35)
BASOPHILS # BLD AUTO: 0.1 10*3/UL (ref 0–0.1)
BASOPHILS NFR BLD AUTO: 0.8 % (ref 0–1)
BUN SERPL-MCNC: 18 MG/DL (ref 7–24)
CHLORIDE SERPL-SCNC: 107 MMOL/L (ref 98–107)
CREAT SERPL-MCNC: 1.08 MG/DL (ref 0.55–1.02)
EOSINOPHIL # BLD AUTO: 0.3 10*3/UL (ref 0–0.4)
EOSINOPHIL # BLD AUTO: 5.1 % (ref 1–4)
ERYTHROCYTE [DISTWIDTH] IN BLOOD BY AUTOMATED COUNT: 16 % (ref 0–14.5)
HCT VFR BLD AUTO: 33 % (ref 37–47)
LYMPHOCYTES # BLD AUTO: 2.5 10*3/UL (ref 1.3–4.4)
LYMPHOCYTES NFR BLD AUTO: 41.8 % (ref 27–41)
MCH RBC QN AUTO: 31.1 PG (ref 27–31)
MCHC RBC AUTO-ENTMCNC: 31.8 G/DL (ref 33–37)
MCV RBC AUTO: 97.6 FL (ref 81–99)
MONOCYTES # BLD AUTO: 0.7 10*3/UL (ref 0.1–1)
MONOCYTES NFR BLD MANUAL: 10.7 % (ref 3–9)
NEUT #: 2.5 10*3/UL (ref 2.3–7.9)
NEUT %: 41.4 % (ref 47–73)
NRBC BLD QL AUTO: 0 10*3/UL (ref 0–0)
PLATELET # BLD AUTO: 189 10*3/UL (ref 130–400)
PMV BLD AUTO: 10.5 FL (ref 9.6–12.3)
POTASSIUM SERPL-SCNC: 4.6 MMOL/L (ref 3.5–5.1)
PROT SERPL-MCNC: 6.6 GM/DL (ref 6.4–8.2)
RBC # BLD AUTO: 3.38 10*6/UL (ref 4.1–5.1)
SODIUM SERPL-SCNC: 138 MMOL/L (ref 136–145)
WBC NRBC COR # BLD AUTO: 6.1 10*3/UL (ref 4.8–10.8)

## 2022-05-02 VITALS — DIASTOLIC BLOOD PRESSURE: 85 MMHG

## 2022-05-02 VITALS — DIASTOLIC BLOOD PRESSURE: 82 MMHG | SYSTOLIC BLOOD PRESSURE: 156 MMHG

## 2022-05-02 VITALS — SYSTOLIC BLOOD PRESSURE: 134 MMHG | DIASTOLIC BLOOD PRESSURE: 62 MMHG

## 2022-05-02 VITALS — DIASTOLIC BLOOD PRESSURE: 55 MMHG

## 2022-05-02 VITALS — DIASTOLIC BLOOD PRESSURE: 95 MMHG

## 2022-05-03 VITALS — DIASTOLIC BLOOD PRESSURE: 75 MMHG | SYSTOLIC BLOOD PRESSURE: 136 MMHG

## 2022-05-03 VITALS — SYSTOLIC BLOOD PRESSURE: 127 MMHG | DIASTOLIC BLOOD PRESSURE: 62 MMHG

## 2022-05-03 VITALS — DIASTOLIC BLOOD PRESSURE: 62 MMHG

## 2022-05-03 VITALS — DIASTOLIC BLOOD PRESSURE: 63 MMHG

## 2022-05-03 VITALS — DIASTOLIC BLOOD PRESSURE: 60 MMHG

## 2022-05-03 LAB
BASOPHILS # BLD AUTO: 0 10*3/UL (ref 0–0.1)
BASOPHILS NFR BLD AUTO: 0.6 % (ref 0–1)
CREAT SERPL-MCNC: 0.96 MG/DL (ref 0.55–1.02)
EOSINOPHIL # BLD AUTO: 0.3 10*3/UL (ref 0–0.4)
EOSINOPHIL # BLD AUTO: 5.1 % (ref 1–4)
ERYTHROCYTE [DISTWIDTH] IN BLOOD BY AUTOMATED COUNT: 15.8 % (ref 0–14.5)
HCT VFR BLD AUTO: 32.6 % (ref 37–47)
LYMPHOCYTES # BLD AUTO: 2.4 10*3/UL (ref 1.3–4.4)
LYMPHOCYTES NFR BLD AUTO: 45.1 % (ref 27–41)
MCH RBC QN AUTO: 31.5 PG (ref 27–31)
MCHC RBC AUTO-ENTMCNC: 32.5 G/DL (ref 33–37)
MCV RBC AUTO: 96.7 FL (ref 81–99)
MONOCYTES # BLD AUTO: 0.6 10*3/UL (ref 0.1–1)
MONOCYTES NFR BLD MANUAL: 11 % (ref 3–9)
NEUT #: 2 10*3/UL (ref 2.3–7.9)
NEUT %: 38 % (ref 47–73)
NRBC BLD QL AUTO: 0 10*3/UL (ref 0–0)
PLATELET # BLD AUTO: 175 10*3/UL (ref 130–400)
PMV BLD AUTO: 10.2 FL (ref 9.6–12.3)
RBC # BLD AUTO: 3.37 10*6/UL (ref 4.1–5.1)
WBC NRBC COR # BLD AUTO: 5.3 10*3/UL (ref 4.8–10.8)

## 2022-05-04 VITALS — DIASTOLIC BLOOD PRESSURE: 57 MMHG

## 2022-05-04 VITALS — DIASTOLIC BLOOD PRESSURE: 66 MMHG

## 2022-05-04 VITALS — DIASTOLIC BLOOD PRESSURE: 59 MMHG

## 2022-05-04 LAB
BUN SERPL-MCNC: 16 MG/DL (ref 7–24)
CHLORIDE SERPL-SCNC: 106 MMOL/L (ref 98–107)
CREAT SERPL-MCNC: 0.95 MG/DL (ref 0.55–1.02)
POTASSIUM SERPL-SCNC: 5 MMOL/L (ref 3.5–5.1)
SODIUM SERPL-SCNC: 136 MMOL/L (ref 136–145)

## 2022-05-05 ENCOUNTER — TELEPHONE (OUTPATIENT)
Dept: FAMILY MEDICINE CLINIC | Age: 87
End: 2022-05-05

## 2022-05-05 RX ORDER — BLOOD-GLUCOSE METER
1 KIT MISCELLANEOUS DAILY
Qty: 1 KIT | Refills: 0 | Status: SHIPPED
Start: 2022-05-05 | End: 2022-05-05 | Stop reason: SDUPTHER

## 2022-05-05 RX ORDER — LANCETS 30 GAUGE
1 EACH MISCELLANEOUS 4 TIMES DAILY
Qty: 600 EACH | Refills: 1 | Status: SHIPPED | OUTPATIENT
Start: 2022-05-05

## 2022-05-05 RX ORDER — BLOOD-GLUCOSE METER
1 KIT MISCELLANEOUS DAILY
Qty: 1 KIT | Refills: 0 | Status: SHIPPED | OUTPATIENT
Start: 2022-05-05

## 2022-05-05 RX ORDER — GLUCOSAMINE HCL/CHONDROITIN SU 500-400 MG
CAPSULE ORAL
Qty: 400 STRIP | Refills: 3 | Status: SHIPPED
Start: 2022-05-05 | End: 2022-05-05 | Stop reason: SDUPTHER

## 2022-05-05 RX ORDER — LANCETS 30 GAUGE
1 EACH MISCELLANEOUS 4 TIMES DAILY
Qty: 600 EACH | Refills: 1 | Status: SHIPPED
Start: 2022-05-05 | End: 2022-05-05 | Stop reason: SDUPTHER

## 2022-05-05 RX ORDER — GLUCOSAMINE HCL/CHONDROITIN SU 500-400 MG
CAPSULE ORAL
Qty: 400 STRIP | Refills: 3 | Status: SHIPPED | OUTPATIENT
Start: 2022-05-05

## 2022-05-17 ENCOUNTER — OUTSIDE SERVICES (OUTPATIENT)
Dept: FAMILY MEDICINE CLINIC | Age: 87
End: 2022-05-17
Payer: MEDICARE

## 2022-05-17 DIAGNOSIS — R05.9 COUGH: Primary | ICD-10-CM

## 2022-05-17 DIAGNOSIS — K21.00 GASTROESOPHAGEAL REFLUX DISEASE WITH ESOPHAGITIS WITHOUT HEMORRHAGE: ICD-10-CM

## 2022-05-17 DIAGNOSIS — E11.65 TYPE 2 DIABETES MELLITUS WITH HYPERGLYCEMIA, WITHOUT LONG-TERM CURRENT USE OF INSULIN (HCC): ICD-10-CM

## 2022-05-17 DIAGNOSIS — N39.0 URINARY TRACT INFECTION WITHOUT HEMATURIA, SITE UNSPECIFIED: ICD-10-CM

## 2022-05-17 DIAGNOSIS — I25.10 CORONARY ARTERY DISEASE INVOLVING NATIVE CORONARY ARTERY OF NATIVE HEART WITHOUT ANGINA PECTORIS: ICD-10-CM

## 2022-05-19 LAB
ALBUMIN: 3.3 GM/DL (ref 3.1–4.5)
ALP BLD-CCNC: 71 U/L (ref 45–117)
ALT SERPL-CCNC: 14 U/L (ref 12–78)
AST SERPL-CCNC: 14 IU/L (ref 3–35)
BILIRUB SERPL-MCNC: 0.5 MG/DL (ref 0.2–1)
BUN BLDV-MCNC: 27 MG/DL (ref 7–24)
CALCIUM SERPL-MCNC: 9.4 MG/DL (ref 8.5–10.5)
CHLORIDE BLD-SCNC: 113 MMOL/L (ref 98–107)
CO2: 23 MMOL/L (ref 21–32)
CREAT SERPL-MCNC: 1.14 MG/DL (ref 0.55–1.02)
GFR AFRICAN AMERICAN: 54 ML/MIN
GFR SERPL CREATININE-BSD FRML MDRD: 45 ML/MIN/
GLUCOSE: 103 MG/DL (ref 65–99)
HCT VFR BLD CALC: 32.1 % (ref 37–47)
HEMOGLOBIN: 10.1 G/DL (ref 12–16)
MCH RBC QN AUTO: 31.1 PG (ref 27–31)
MCHC RBC AUTO-ENTMCNC: 31.5 G/DL (ref 33–37)
MCV RBC AUTO: 98.8 FL (ref 81–99)
NUCLEATED RED BLOOD CELLS: 0 % (ref 0–0)
PDW BLD-RTO: 16 % (ref 0–14.5)
PLATELET # BLD: 222 10*3/UL (ref 130–400)
PMV BLD AUTO: 10.7 FL (ref 9.6–12.3)
POTASSIUM SERPL-SCNC: 4.2 MMOL/L (ref 3.5–5.1)
RBC # BLD: 3.25 10*6/UL (ref 4.1–5.1)
SODIUM BLD-SCNC: 141 MMOL/L (ref 136–145)
TOTAL PROTEIN: 6.4 GM/DL (ref 6.4–8.2)
WBC # BLD: 5.3 10*3/UL (ref 4.8–10.8)

## 2022-05-31 ENCOUNTER — OUTSIDE SERVICES (OUTPATIENT)
Dept: FAMILY MEDICINE CLINIC | Age: 87
End: 2022-05-31
Payer: MEDICARE

## 2022-05-31 DIAGNOSIS — G30.1 LATE ONSET ALZHEIMER'S DISEASE WITH BEHAVIORAL DISTURBANCE (HCC): Primary | ICD-10-CM

## 2022-05-31 DIAGNOSIS — F02.818 LATE ONSET ALZHEIMER'S DISEASE WITH BEHAVIORAL DISTURBANCE (HCC): ICD-10-CM

## 2022-05-31 DIAGNOSIS — M15.9 PRIMARY OSTEOARTHRITIS INVOLVING MULTIPLE JOINTS: ICD-10-CM

## 2022-05-31 DIAGNOSIS — F02.818 LATE ONSET ALZHEIMER'S DISEASE WITH BEHAVIORAL DISTURBANCE (HCC): Primary | ICD-10-CM

## 2022-05-31 DIAGNOSIS — F43.21 GRIEF REACTION: ICD-10-CM

## 2022-05-31 DIAGNOSIS — G93.41 METABOLIC ENCEPHALOPATHY: ICD-10-CM

## 2022-05-31 DIAGNOSIS — I50.9 CONGESTIVE HEART FAILURE, UNSPECIFIED HF CHRONICITY, UNSPECIFIED HEART FAILURE TYPE (HCC): ICD-10-CM

## 2022-05-31 DIAGNOSIS — G30.1 LATE ONSET ALZHEIMER'S DISEASE WITH BEHAVIORAL DISTURBANCE (HCC): ICD-10-CM

## 2022-05-31 DIAGNOSIS — R53.1 GENERALIZED WEAKNESS: Primary | ICD-10-CM

## 2022-05-31 RX ORDER — MORPHINE SULFATE 20 MG/ML
5 SOLUTION ORAL EVERY 4 HOURS PRN
Qty: 15 ML | Refills: 0 | Status: SHIPPED | OUTPATIENT
Start: 2022-05-31 | End: 2022-06-30

## 2022-05-31 RX ORDER — LORAZEPAM 0.5 MG/1
TABLET ORAL
Qty: 93 TABLET | Refills: 2 | Status: SHIPPED
Start: 2022-05-31 | End: 2022-09-29 | Stop reason: SDUPTHER

## 2022-05-31 NOTE — PROGRESS NOTES
5/31/2022    [address]  9/21/1932    This resident is being seen today for an acute evaluation visit. She is a resident who has long-term medical conditions including CPK pending. failure to thrive, arteriosclerotic heart disease, COVID-19, GERD, diabetes mellitus, dementia, Alzheimer's, pelvic fracture. She is a 80 y.o. female resident who is being seen today for an acute evaluation visit with resident recently readmitted back to the facility after a short stay in the hospital setting due to metabolic encephalopathy with generalized weakness. She was noted to have moderate CHF in mid May and was given the benefit of Lasix daily with recommendations for an EKG but was subsequently sent to the hospital setting. Upon return, family was concerned about her decreased cognition with physical decline and asked that she be placed on hospice services in this regard. This is a resident who resides in the memory care unit and is relatively repetitive and tends to cry a lot. She is essentially in no acute distress and denies complaints regarding pain, headaches or dizziness, sore throat, chest pain, coughing or shortness of breath, nausea or vomiting, constipation or diarrhea, dysuria or frequency, fever or chills, falls or syncopal events. Medications:  Acetaminophen 650 mg twice daily  Aspirin 81 mg daily  Donepezil 10 mg at bedtime  Escitalopram 15 milligrams daily   Lorazepam 0.5 mg twice daily  Nebivolol 7.5 mg 3 tablets daily   Omeprazole 20 mg daily  Potassium chloride 10 mEq 3 times daily  Vitamin B12 1000 mcg weekly  Vitamin D3 2000 units daily iron  Albuterol as needed  MPAP 325 mg with 2 tablets daily as needed      Objective     Vital Signs: Blood pressure 117/74 pulse 80 respirations 18 temperature 97.6 weight 151 pounds     Physical examination:Skin is essentially warm and dry. HEENT  unremarkable. Neck is supple. Heart regular rate and rhythm. No rubs, gallops or murmurs noted.   Lungs are consistent with some degree of slight crackles with no evidence of wheezing at this time. Abdomen is soft, supple and non-tender. Bowel sounds are noted x4 quadrants. No rigidity, guarding or rebound tenderness. Negative Gutierrez's, negative McBurney's, negative Luis's. Extremities; mild to 1+ edema. Pulses are adequate. No clubbing  or no cyanosis noted. Neurologically she  is alert and oriented x2. No evidence of paralysis or paresthesias noted. Diagnoses and all orders for this visit:    Generalized weakness  Comments:  Currently given the benefit of hospice services with palliative care    Late onset Alzheimer's disease with behavioral disturbance ([facility]  Comments:  Resides in memory care unit with current recommendations for donepezil    Metabolic encephalopathy  Comments:  Stable under palliative care    Congestive heart failure, unspecified HF chronicity, unspecified heart failure type ([facility]  Comments:  Currently controlled with recent recommendations for diuretic management    Primary osteoarthritis involving multiple joints  Comments:  Currently stable with vitamin supplementation          Plan:  Plan of care was discussed with the healthcare team with medications and labs reviewed. BUN/creatinine 27/1.14 with a GFR of 50 4H/H10.1/32.1 and most recently BUN/creatinine 29/1.43 with a GFR 35 and H/H 11.2/34.1 and she did have a recent 2D echo with an LVEF of 35 to 40%. .  As stated, she is currently under hospice services with recommendations from palliative care and no further labs regarding a decrease in her GFR. She will therefore maintain her current medical regimen at this time with recommendations for Dr. Otto Bradley to see her as a readmission to the facility in the course of the next week. JOSE WILLIS, LUCÍA - CNP      *Note was creating using voice recognition software.   The document was reviewed however grammatical errors may exist.

## 2022-06-01 LAB
BUN BLDV-MCNC: 25 MG/DL (ref 7–24)
CALCIUM SERPL-MCNC: 9.3 MG/DL (ref 8.5–10.5)
CHLORIDE BLD-SCNC: 105 MMOL/L (ref 98–107)
CO2: 25 MMOL/L (ref 21–32)
CREAT SERPL-MCNC: 1.04 MG/DL (ref 0.55–1.02)
GFR AFRICAN AMERICAN: > 60 ML/MIN
GFR SERPL CREATININE-BSD FRML MDRD: 50 ML/MIN/
GLUCOSE: 110 MG/DL (ref 65–99)
POTASSIUM SERPL-SCNC: 4.2 MMOL/L (ref 3.5–5.1)
SODIUM BLD-SCNC: 138 MMOL/L (ref 136–145)

## 2022-06-07 ENCOUNTER — OUTSIDE SERVICES (OUTPATIENT)
Dept: FAMILY MEDICINE CLINIC | Age: 87
End: 2022-06-07
Payer: MEDICARE

## 2022-06-07 DIAGNOSIS — E11.65 TYPE 2 DIABETES MELLITUS WITH HYPERGLYCEMIA, WITHOUT LONG-TERM CURRENT USE OF INSULIN (HCC): ICD-10-CM

## 2022-06-07 DIAGNOSIS — K21.00 GASTROESOPHAGEAL REFLUX DISEASE WITH ESOPHAGITIS WITHOUT HEMORRHAGE: ICD-10-CM

## 2022-06-07 DIAGNOSIS — I42.8 CARDIOMYOPATHY, NONISCHEMIC (HCC): ICD-10-CM

## 2022-06-07 DIAGNOSIS — G47.01 INSOMNIA DUE TO MEDICAL CONDITION: ICD-10-CM

## 2022-06-07 DIAGNOSIS — S51.011A SKIN TEAR OF RIGHT ELBOW WITHOUT COMPLICATION, INITIAL ENCOUNTER: Primary | ICD-10-CM

## 2022-06-07 NOTE — PROGRESS NOTES
6/7/2022    [address]  9/21/1932    This resident is being seen today for an acute evaluation visit. She is a resident who has long-term medical conditions including CPK pending. failure to thrive, arteriosclerotic heart disease, COVID-19, GERD, diabetes mellitus, dementia, Alzheimer's, pelvic fracture. She is a 80 y.o. female resident who is being seen today for an acute evaluation visit regarding what appeared to be a previously infected skin tear. Resident had a notable tear to the right elbow, of unknown etiology. It apparently did become infected and they did contact the physician and she was placed on Keflex 500 mg 4 times a day for the course of 10 days with recommendations for proper cleansing with dressing changes. This is a resident who does continue with hospice services and was pleasant throughout my evaluation. She denied complaints of pain, headaches or dizziness, sore throat, chest pain, coughing or shortness of breath, nausea or vomiting, constipation or diarrhea, dysuria or frequency, fever or chills, falls or syncopal events. Medications:  Acetaminophen 650 mg twice daily  Aspirin 81 mg daily  Donepezil 10 mg at bedtime  Escitalopram 15 milligrams daily   Lorazepam 0.5 mg twice daily  Nebivolol 7.5 mg 3 tablets daily   Omeprazole 20 mg daily  Potassium chloride 10 mEq 3 times daily  Vitamin B12 1000 mcg weekly  Vitamin D3 2000 units daily iron  Albuterol as needed  MPAP 325 mg with 2 tablets daily as needed      Objective     Vital Signs: Blood pressure 125/65 pulse 67 respirations 18 temperature 97.6 weight 151 pounds     Physical examination:Skin is essentially warm and dry. She does have a skin tear noted to her right elbow, without infectious etiology noted. HEENT  unremarkable. Neck is supple. Heart regular rate and rhythm. No rubs, gallops or murmurs noted. Lungs are consistent with some degree of slight crackles with no evidence of wheezing at this time.   Abdomen is soft, supple and non-tender. Bowel sounds are noted x4 quadrants. No rigidity, guarding or rebound tenderness. Negative Gutierrez's, negative McBurney's, negative Luis's. Extremities; mild to 1+ edema. Pulses are adequate. No clubbing  or no cyanosis noted. Neurologically she  is alert and oriented x2. No evidence of paralysis or paresthesias noted. Diagnoses and all orders for this visit:    Skin tear of right elbow without complication, initial encounter  Comments:  Given the benefit of Keflex 4 times a day for 10 days due to previous notable infection. Continue to cleanse twice a day and apply Bactroban with a Mepilex brittany    Type 2 diabetes mellitus with hyperglycemia, without long-term current use of insulin ([facility])  Comments:  Maintain low-dose aspirin    Gastroesophageal reflux disease with esophagitis without hemorrhage  Comments:  Currently controlled with omeprazole    Insomnia due to medical condition  Comments:  Stable    Cardiomyopathy, nonischemic (HCC)  Comments:  Maintain aspirin with lipid and blood pressure management          Plan:  Plan of care was discussed with the healthcare team with medications and labs reviewed. Resident did have recent labs with a BUN/creatinine 25/1.04 with a GFR of 60 potassium of 4.2. Regarding the skin tear, it does not appear to have any infectious etiology. She will therefore continue forth with the benefit of her Keflex 4 times a day over the course of the next 10 days with recommendations to cleanse the area twice a day and apply Bactroban with a Mepilex dressing. She will furthermore continue with her current plan of care with palliative care measures and I will see her routinely and as needed with further orders forthcoming. JOSE WILLIS, APRN - CNP      *Note was creating using voice recognition software.   The document was reviewed however grammatical errors may exist.

## 2022-06-21 ENCOUNTER — OUTSIDE SERVICES (OUTPATIENT)
Dept: FAMILY MEDICINE CLINIC | Age: 87
End: 2022-06-21
Payer: MEDICARE

## 2022-06-21 DIAGNOSIS — M15.9 PRIMARY OSTEOARTHRITIS INVOLVING MULTIPLE JOINTS: ICD-10-CM

## 2022-06-21 DIAGNOSIS — I50.9 CONGESTIVE HEART FAILURE, UNSPECIFIED HF CHRONICITY, UNSPECIFIED HEART FAILURE TYPE (HCC): ICD-10-CM

## 2022-06-21 DIAGNOSIS — G30.1 LATE ONSET ALZHEIMER'S DISEASE WITH BEHAVIORAL DISTURBANCE (HCC): ICD-10-CM

## 2022-06-21 DIAGNOSIS — E11.65 TYPE 2 DIABETES MELLITUS WITH HYPERGLYCEMIA, WITHOUT LONG-TERM CURRENT USE OF INSULIN (HCC): Primary | ICD-10-CM

## 2022-06-21 DIAGNOSIS — I25.10 CORONARY ARTERY DISEASE INVOLVING NATIVE CORONARY ARTERY OF NATIVE HEART WITHOUT ANGINA PECTORIS: ICD-10-CM

## 2022-06-21 DIAGNOSIS — F02.818 LATE ONSET ALZHEIMER'S DISEASE WITH BEHAVIORAL DISTURBANCE (HCC): ICD-10-CM

## 2022-06-21 NOTE — PROGRESS NOTES
6/21/2022    [address]  9/21/1932    This resident is being seen today for an acute evaluation visit. She is a resident who has long-term medical conditions including CPK pending. failure to thrive, arteriosclerotic heart disease, COVID-19, GERD, diabetes mellitus, dementia, Alzheimer's, pelvic fracture. She is a 80 y.o. female resident who is being seen today for an acute evaluation visit with staff requesting that I evaluate her blood sugars. This is a resident who was under recent assessment for a skin tear to the right forearm which did become infected and was treated accordingly with Keflex and has resolution in this regard. She does reside in the memory care unit and tends to be very emotional and cries quite often. She currently denies any complaints with respect to pain, headaches or dizziness, sore throat, chest pain, coughing or shortness of breath, nausea or vomiting, constipation or diarrhea, dysuria or frequency, fever or chills, falls or syncopal events. Medications:  Acetaminophen 650 mg twice daily  Aspirin 81 mg daily  Donepezil 10 mg at bedtime  Escitalopram 15 milligrams daily   Lorazepam 0.5 mg twice daily  Nebivolol 7.5 mg 3 tablets daily   Omeprazole 20 mg daily  Potassium chloride 10 mEq 3 times daily  Vitamin B12 1000 mcg weekly  Vitamin D3 2000 units daily iron  Albuterol as needed  MPAP 325 mg with 2 tablets daily as needed      Objective     Vital Signs: Blood pressure 145/60 pulse 70 respirations 18 temperature 97.8 weight 135 pounds     Physical examination:Skin is essentially warm and dry. HEENT  unremarkable. Neck is supple. Heart regular rate and rhythm. No rubs, gallops or murmurs noted. Lungs are consistent with some degree of slight crackles with no evidence of wheezing at this time. Abdomen is soft, supple and non-tender. Bowel sounds are noted x4 quadrants. No rigidity, guarding or rebound tenderness.   Negative Gutierrez's, negative McBurney's, negative Luis's. Extremities; mild to 1+ edema. Pulses are adequate. No clubbing  or no cyanosis noted. Neurologically she  is alert and oriented x2. No evidence of paralysis or paresthesias noted. Diagnoses and all orders for this visit:    Type 2 diabetes mellitus with hyperglycemia, without long-term current use of insulin ([facility])  Comments:  Change blood sugar Accu-Cheks from 4 times a day to 4 PM daily    Primary osteoarthritis involving multiple joints  Comments:  Currently stable with vitamin supplementation with Tylenol as needed    Late onset Alzheimer's disease with behavioral disturbance ([facility] [address]  Comments:  Stable with low-dose aspirin with donepezil    Congestive heart failure, unspecified HF chronicity, unspecified heart failure type ([facility] [address]  Comments:  Currently asymptomatic    Coronary artery disease involving native coronary artery of native heart without angina pectoris  Comments:  Stable with low-dose aspirin with lipid and blood pressure management          Plan:  Plan of care was discussed with the healthcare team with medications and labs reviewed. BUN/creatinine 25/1.04 with a GFR greater than 60. Blood sugars were furthermore noted and reviewed and essentially within normal limits. I will therefore downward titrate her blood sugar Accu-Cheks from 4 times a day to 4 PM only, considering that her sugar is elevated most often at that time. We will plan to reevaluate her in the course of the next several weeks in terms of blood sugar management. She will furthermore continue with her plan of care as otherwise stated and I will see her routinely and as needed with further orders forthcoming. JOSE WILLIS, APRN - CNP      *Note was creating using voice recognition software.   The document was reviewed however grammatical errors may exist.

## 2022-06-23 RX ORDER — METOPROLOL SUCCINATE 25 MG/1
25 TABLET, EXTENDED RELEASE ORAL DAILY
Qty: 31 TABLET | Refills: 5 | Status: SHIPPED | OUTPATIENT
Start: 2022-06-23 | End: 2022-07-27

## 2022-06-23 RX ORDER — METOPROLOL SUCCINATE 25 MG/1
25 TABLET, EXTENDED RELEASE ORAL DAILY
COMMUNITY
Start: 2022-06-15 | End: 2022-06-23 | Stop reason: SDUPTHER

## 2022-07-22 RX ORDER — INSULIN LISPRO 100 [IU]/ML
INJECTION, SOLUTION INTRAVENOUS; SUBCUTANEOUS
Qty: 10 PEN | Refills: 5 | Status: SHIPPED | OUTPATIENT
Start: 2022-07-22

## 2022-07-27 ENCOUNTER — OUTSIDE SERVICES (OUTPATIENT)
Dept: FAMILY MEDICINE CLINIC | Age: 87
End: 2022-07-27
Payer: MEDICARE

## 2022-07-27 VITALS
SYSTOLIC BLOOD PRESSURE: 122 MMHG | TEMPERATURE: 97.3 F | HEART RATE: 67 BPM | DIASTOLIC BLOOD PRESSURE: 74 MMHG | OXYGEN SATURATION: 96 %

## 2022-07-27 DIAGNOSIS — Z00.8 ENCOUNTER FOR OTHER GENERAL EXAMINATION: Primary | ICD-10-CM

## 2022-07-27 LAB
ALBUMIN: 2.8 GM/DL (ref 3.1–4.5)
ALP BLD-CCNC: 93 U/L (ref 45–117)
ALT SERPL-CCNC: 13 U/L (ref 12–78)
AST SERPL-CCNC: 22 IU/L (ref 3–35)
BILIRUB SERPL-MCNC: 0.3 MG/DL (ref 0.2–1)
BUN BLDV-MCNC: 26 MG/DL (ref 7–24)
CALCIUM SERPL-MCNC: 9.1 MG/DL (ref 8.5–10.5)
CHLORIDE BLD-SCNC: 110 MMOL/L (ref 98–107)
CHOLESTEROL: 152 MG/DL
CO2: 26 MMOL/L (ref 21–32)
CREAT SERPL-MCNC: 1.12 MG/DL (ref 0.55–1.02)
GFR AFRICAN AMERICAN: 56 ML/MIN
GFR SERPL CREATININE-BSD FRML MDRD: 46 ML/MIN/
GLUCOSE: 113 MG/DL (ref 65–99)
HCT VFR BLD CALC: 32.4 % (ref 37–47)
HDLC SERPL-MCNC: 51 MG/DL (ref 40–60)
HEMOGLOBIN: 10.4 G/DL (ref 12–16)
LDL CHOLESTEROL: 88 MG/DL (ref 9–159)
MCH RBC QN AUTO: 30.7 PG (ref 27–31)
MCHC RBC AUTO-ENTMCNC: 32.1 G/DL (ref 33–37)
MCV RBC AUTO: 95.6 FL (ref 81–99)
NUCLEATED RED BLOOD CELLS: 0 % (ref 0–0)
PDW BLD-RTO: 13.2 % (ref 0–14.5)
PLATELET # BLD: 190 10*3/UL (ref 130–400)
PMV BLD AUTO: 11.2 FL (ref 9.6–12.3)
POTASSIUM SERPL-SCNC: 4.6 MMOL/L (ref 3.5–5.1)
RBC # BLD: 3.39 10*6/UL (ref 4.1–5.1)
SODIUM BLD-SCNC: 142 MMOL/L (ref 136–145)
T4 FREE: 1.01 NG/DL (ref 0.76–1.46)
TOTAL PROTEIN: 6.5 GM/DL (ref 6.4–8.2)
TRIGL SERPL-MCNC: 63 MG/DL
TSH SERPL DL<=0.05 MIU/L-ACNC: 2.27 UIU/ML (ref 0.36–4.75)
VITAMIN D 25-HYDROXY: 42.1 NG/ML (ref 30–100)
VLDLC SERPL CALC-MCNC: 13 MG/DL (ref 6–40)
WBC # BLD: 5.4 10*3/UL (ref 4.8–10.8)

## 2022-07-27 PROCEDURE — G0439 PPPS, SUBSEQ VISIT: HCPCS | Performed by: FAMILY MEDICINE

## 2022-07-27 RX ORDER — HYDROCODONE BITARTRATE AND ACETAMINOPHEN 5; 325 MG/1; MG/1
1 TABLET ORAL EVERY 8 HOURS PRN
COMMUNITY
Start: 2022-05-27 | End: 2022-07-27

## 2022-07-27 RX ORDER — FUROSEMIDE 20 MG/1
1 TABLET ORAL DAILY
COMMUNITY
Start: 2022-05-18 | End: 2022-10-18 | Stop reason: SDUPTHER

## 2022-07-27 ASSESSMENT — PATIENT HEALTH QUESTIONNAIRE - PHQ9
2. FEELING DOWN, DEPRESSED OR HOPELESS: 0
SUM OF ALL RESPONSES TO PHQ9 QUESTIONS 1 & 2: 0
1. LITTLE INTEREST OR PLEASURE IN DOING THINGS: 0
SUM OF ALL RESPONSES TO PHQ QUESTIONS 1-9: 0

## 2022-07-27 ASSESSMENT — LIFESTYLE VARIABLES
HOW MANY STANDARD DRINKS CONTAINING ALCOHOL DO YOU HAVE ON A TYPICAL DAY: PATIENT DOES NOT DRINK
HOW OFTEN DO YOU HAVE A DRINK CONTAINING ALCOHOL: NEVER

## 2022-07-27 NOTE — PROGRESS NOTES
Medicare Annual Wellness Visit    Angely Julien is here for [facility] AWV    Assessment & Plan   Encounter for other general examination    Recommendations for Preventive Services Due: see orders and patient instructions/AVS.  Recommended screening schedule for the next 5-10 years is provided to the patient in written form: see Patient Instructions/AVS.     Return for Medicare Annual Wellness Visit in 1 year. Subjective       Patient's complete Health Risk Assessment and screening values have been reviewed and are found in Flowsheets. The following problems were reviewed today and where indicated follow up appointments were made and/or referrals ordered. Positive Risk Factor Screenings with Interventions:     Cognitive: Words recalled: 0 Words Recalled  Clock Drawing Test (CDT): (!) Abnormal (unable to do)  Total Score Interpretation: Abnormal Mini-Cog  Did the patient refuse to take the cognition test?: No  Cognitive Impairment Interventions:  Lives at [facility] and addressed             Health Habits/Nutrition:  Physical Activity: Inactive    Days of Exercise per Week: 0 days    Minutes of Exercise per Session: 0 min     Have you lost any weight without trying in the past 3 months?: No     Have you seen the dentist within the past year?: (!) No  Health Habits/Nutrition Interventions:  Assisted living facility offers nutitionist on site    Hearing/Vision:  Do you or your family notice any trouble with your hearing that hasn't been managed with hearing aids?: No  Do you have difficulty driving, watching TV, or doing any of your daily activities because of your eyesight?: No  Have you had an eye exam within the past year?: (!) No  No results found.   Hearing/Vision Interventions:  Hearing test offered on site     ADLs:  In the past 7 days, did you need help from others to perform any of the following everyday activities: Eating, dressing, grooming, bathing, toileting, or walking/balance?: (!) Yes  Select all that apply: (!) Dressing, Grooming, Bathing  In the past 7 days, did you need help from others to take care of any of the following: Laundry, housekeeping, banking/finances, shopping, telephone use, food preparation, transportation, or taking medications?: (!) Yes  Select all that apply: [employer], Housekeeping, Food Preparation, Transportation, Taking Medications  ADL Interventions: On site PT/OT offered          Objective   Vitals:    07/27/22 1600   BP: 122/74   Pulse: 67   Temp: 97.3 °F (36.3 °C)   SpO2: 96%      There is no height or weight on file to calculate BMI. Allergies   Allergen Reactions    Codeine     Statins      Prior to Visit Medications    Medication Sig Taking? Authorizing Provider   insulin lispro, 1 Unit Dial, (HUMALOG KWIKPEN) 100 UNIT/ML SOPN Check BGM 4 times a day, subq coverage per scale:0-150=0, 151-200=2 units; 201-250=3 Units; 251-300=6 Units; 301-350= 9 Units; 351-400= 12 Units; >400 =14 Units Yes Alison Dockery, DO   metoprolol succinate (TOPROL XL) 25 MG extended release tablet Take 1 tablet by mouth daily Yes Alison Dockery, DO   glucose monitoring (FREESTYLE FREEDOM) kit 1 kit by Does not apply route daily Yes LUCÍA Basurto CNP   Insulin Syringes, Disposable, U-100 1 ML MISC 1 each by Does not apply route daily Yes LUCÍA Basurto CNP   Lancets MISC 1 each by Does not apply route 4 times daily Yes LUCÍA Basurto CNP   blood glucose monitor strips Test 4 times a day & as needed for symptoms of irregular blood glucose. Dispense sufficient amount for indicated testing frequency plus additional to accommodate PRN testing needs.  Yes LUCÍA Dick CNP   aspirin (ASPIRIN 81) 81 MG EC tablet Take 1 tablet by mouth daily Yes Alison Dockery DO   donepezil (ARICEPT) 10 MG tablet Take 1 tablet by mouth nightly Yes Alison Dockery, DO   omeprazole (PRILOSEC) 20 MG delayed release capsule Take 1 capsule by mouth every morning (before breakfast) Yes Latesha Dalton, DO   potassium chloride (KLOR-CON M) 10 MEQ extended release tablet Take 1 tablet by mouth 3 times daily Yes Latesha Dalton, DO   albuterol sulfate HFA (VENTOLIN HFA) 108 (90 Base) MCG/ACT inhaler Inhale 2 puffs into the lungs 4 times daily as needed for Wheezing Yes Nivia Pittman MD   furosemide (LASIX) 20 MG tablet Take 1 tablet by mouth in the morning.   Historical Provider, MD   acetaminophen (TYLENOL) 325 MG tablet TAKE TWO TABLETS (650MG) BY MOUTH TWICE A DAY  Narayan Otero DO   cyanocobalamin ([employer] VITAMIN B12) 1000 MCG tablet Take 1 tablet by mouth once a week  Latesha Dalton,    Cholecalciferol (VITAMIN D3) 50 MCG (2000 UT) TABS Take 1 tablet by mouth daily  Latesha Dalton,    escitalopram (LEXAPRO) 10 MG tablet Take 1.5 tablets by mouth daily (15 mg)  Latesha Dalton, DO   metFORMIN (GLUCOPHAGE) 500 MG tablet Take 1 tablet by mouth 2 times daily (with meals)  Latesha Dalton,    nebivolol (BYSTOLIC) 2.5 MG tablet Take 3 tablets by mouth daily (7.5 mg)  Latesha Dalton, DO   pioglitazone (ACTOS) 30 MG tablet Take 1 tablet by mouth daily  Latesha Dalton,        CareTeam (Including outside providers/suppliers regularly involved in providing care):   Patient Care Team:  Latesha Dalton DO as PCP - General (Family Medicine)  Latesha Dalton DO as PCP - [facility] Empaneled Provider  Tito Mueller DO as Consulting Physician (Cardiology)  Miya William DO as Consulting Physician (Electrophysiology)  Jose Padgett RN as Ambulatory Care Manager     Reviewed and updated this visit:  Tobacco  Allergies  Meds  Med Hx  Surg Hx  Soc Hx  Fam Hx

## 2022-07-27 NOTE — PATIENT INSTRUCTIONS
Personalized Preventive Plan for [place] - 7/27/2022  Medicare offers a range of preventive health benefits. Some of the tests and screenings are paid in full while other may be subject to a deductible, co-insurance, and/or copay. Some of these benefits include a comprehensive review of your medical history including lifestyle, illnesses that may run in your family, and various assessments and screenings as appropriate. After reviewing your medical record and screening and assessments performed today your provider may have ordered immunizations, labs, imaging, and/or referrals for you. A list of these orders (if applicable) as well as your Preventive Care list are included within your After Visit Summary for your review. Other Preventive Recommendations:    A preventive eye exam performed by an eye specialist is recommended every 1-2 years to screen for glaucoma; cataracts, macular degeneration, and other eye disorders. A preventive dental visit is recommended every 6 months. Try to get at least 150 minutes of exercise per week or 10,000 steps per day on a pedometer . Order or download the FREE \"Exercise & Physical Activity: Your Everyday Guide\" from [employer]. Call 5-791.468.7832 or search [employer] online. You need 7579-0043 mg of calcium and 7562-3111 IU of vitamin D per day. It is possible to meet your calcium requirement with diet alone, but a vitamin D supplement is usually necessary to meet this goal.  When exposed to the sun, use a sunscreen that protects against both UVA and UVB radiation with an SPF of 30 or greater. Reapply every 2 to 3 hours or after sweating, drying off with a towel, or swimming. Always wear a seat belt when traveling in a car. Always wear a helmet when riding a bicycle or motorcycle.

## 2022-08-02 ENCOUNTER — OUTSIDE SERVICES (OUTPATIENT)
Dept: FAMILY MEDICINE CLINIC | Age: 87
End: 2022-08-02
Payer: MEDICARE

## 2022-08-02 DIAGNOSIS — K21.00 GASTROESOPHAGEAL REFLUX DISEASE WITH ESOPHAGITIS WITHOUT HEMORRHAGE: ICD-10-CM

## 2022-08-02 DIAGNOSIS — F41.9 ANXIETY: Primary | ICD-10-CM

## 2022-08-02 DIAGNOSIS — F03.90 DEMENTIA WITHOUT BEHAVIORAL DISTURBANCE, UNSPECIFIED DEMENTIA TYPE: ICD-10-CM

## 2022-08-02 DIAGNOSIS — E11.9 TYPE 2 DIABETES MELLITUS WITHOUT COMPLICATION, WITHOUT LONG-TERM CURRENT USE OF INSULIN (HCC): ICD-10-CM

## 2022-08-02 DIAGNOSIS — I25.10 CORONARY ARTERY DISEASE INVOLVING NATIVE CORONARY ARTERY OF NATIVE HEART WITHOUT ANGINA PECTORIS: ICD-10-CM

## 2022-08-02 NOTE — PROGRESS NOTES
8/2/2022    [address]  9/21/1932    This resident is being seen today for an acute evaluation visit. She is a resident who has long-term medical conditions including CPK pending. failure to thrive, arteriosclerotic heart disease, COVID-19, GERD, diabetes mellitus, dementia, Alzheimer's, pelvic fracture. She is a 80 y.o. female resident who is being seen today for an acute evaluation visit due to underlying anxiety with which she becomes somewhat aggressive with staff. She currently resides in the memory care unit and has lorazepam available on an as-needed basis. This resident is seen in conjunction with hospice services and remains alert responsive to verbal and tactile stimuli. She denies any complaints at the time my evaluation with respect to headaches or dizziness, sore throat, chest pain, coughing or shortness of breath, nausea or vomiting, constipation or diarrhea, dysuria or frequency, fever or chills, falls or syncopal events. Medications:  Acetaminophen 650 mg twice daily  Aspirin 81 mg daily  Donepezil 10 mg at bedtime  Escitalopram 15 milligrams daily   Lorazepam 0.5 mg 3 times daily  Nebivolol 7.5 mg 3 tablets daily   Omeprazole 20 mg daily  Potassium chloride 10 mEq 3 times daily  Vitamin B12 1000 mcg weekly  Vitamin D3 2000 units daily iron  Albuterol as needed  MPAP 325 mg with 2 tablets daily as needed      Objective     Vital Signs: Blood pressure 127/68 pulse 74 respirations 17 temperature 97.4 weight 151 pounds     Physical examination:Skin is essentially warm and dry. HEENT  unremarkable. Neck is supple. Heart regular rate and rhythm. No rubs, gallops or murmurs noted. Lungs are consistent with some degree of slight crackles with no evidence of wheezing at this time. Abdomen is soft, supple and non-tender. Bowel sounds are noted x4 quadrants. No rigidity, guarding or rebound tenderness. Negative Gutierrez's, negative McBurney's, negative Luis's.   Extremities; mild to 1+ edema. Pulses are adequate. No clubbing  or no cyanosis noted. Neurologically she  is alert and oriented x2. No evidence of paralysis or paresthesias noted. Diagnoses and all orders for this visit:    Anxiety  Comments:  Initiate lorazepam 0.5 mg 3 times daily maintain as needed dosing    Gastroesophageal reflux disease with esophagitis without hemorrhage  Comments:  Currently controlled with omeprazole    Coronary artery disease involving native coronary artery of native heart without angina pectoris  Comments:  Stable with low-dose aspirin with lipid and blood pressure management    Type 2 diabetes mellitus without complication, without long-term current use of insulin ([facility])  Comments:  Currently stable    Dementia without behavioral disturbance, unspecified dementia type ([facility] [address]  Comments:  Stable with low-dose aspirin with donepezil          Plan:  Plan of care was discussed with the healthcare team with medications and labs reviewed. BUN/creatinine 26/1.12 with a GFR 46 TSH 2.270H/H10.4/32.4 with a vitamin D of 42.1. I am concerned about the resident and her underlying anxiety, especially with her agitation with aggressive behavior. I am therefore going to place her on lorazepam routinely at 0.5 mg 3 times a day and maintain her as needed dosing. I will plan to follow-up with her in the course of the next couple of weeks to determine if this has been effective. She will otherwise continue with her current plan of care as stated and I will see her routinely and as needed with further orders forthcoming. JOSE WILLIS, APRN - CNP      *Note was creating using voice recognition software.   The document was reviewed however grammatical errors may exist.

## 2022-08-09 ENCOUNTER — OUTSIDE SERVICES (OUTPATIENT)
Dept: FAMILY MEDICINE CLINIC | Age: 87
End: 2022-08-09
Payer: MEDICARE

## 2022-08-09 DIAGNOSIS — I25.10 CORONARY ARTERY DISEASE INVOLVING NATIVE CORONARY ARTERY OF NATIVE HEART WITHOUT ANGINA PECTORIS: ICD-10-CM

## 2022-08-09 DIAGNOSIS — F02.818 LATE ONSET ALZHEIMER'S DISEASE WITH BEHAVIORAL DISTURBANCE (HCC): ICD-10-CM

## 2022-08-09 DIAGNOSIS — I50.9 CONGESTIVE HEART FAILURE, UNSPECIFIED HF CHRONICITY, UNSPECIFIED HEART FAILURE TYPE (HCC): ICD-10-CM

## 2022-08-09 DIAGNOSIS — E11.9 TYPE 2 DIABETES MELLITUS WITHOUT COMPLICATION, WITHOUT LONG-TERM CURRENT USE OF INSULIN (HCC): Primary | ICD-10-CM

## 2022-08-09 DIAGNOSIS — G30.1 LATE ONSET ALZHEIMER'S DISEASE WITH BEHAVIORAL DISTURBANCE (HCC): ICD-10-CM

## 2022-08-09 DIAGNOSIS — F32.A DEPRESSION, UNSPECIFIED DEPRESSION TYPE: ICD-10-CM

## 2022-08-09 NOTE — PROGRESS NOTES
adequate. No clubbing  or no cyanosis noted. Neurologically she  is alert and oriented x2. No evidence of paralysis or paresthesias noted. Diagnoses and all orders for this visit:    Type 2 diabetes mellitus without complication, without long-term current use of insulin (HCC)  Comments:  Discontinue blood sugar Accu-Cheks 4 times daily and check blood sugar fasting and 4 PM on Tuesday and Friday    Depression, unspecified depression type  Comments:  Status post upward titration of Zoloft to 50 mg daily    Late onset Alzheimer's disease with behavioral disturbance (HCC)  Comments:  Continue with low-dose aspirin and donepezil    Congestive heart failure, unspecified HF chronicity, unspecified heart failure type ([facility] [address]  Comments:  Currently asymptomatic    Coronary artery disease involving native coronary artery of native heart without angina pectoris  Comments:  Maintain low-dose aspirin with lipid and blood pressure management            Plan:  Plan of care was discussed with the healthcare team with medications and labs reviewed. BUNs/creatinine 26/1.12 with a GFR 46 TSH 2.270H/H10.4/32.4 vitamin D 42.1. It is of note to mention that this is a resident who did have recent upward titration of her Zoloft to 50 mg daily, which she did tolerate in a satisfactory manner. Her blood sugars have been within normal limits, so I am Veena recommend discontinuation of her blood sugar Accu-Cheks 4 times a day with sliding scale coverage. I will recommend to check her blood sugar both fasting and 4 PM on Tuesday and Friday only. Resident will furthermore continue with her current management as stated and I will see her routinely and as needed with further orders forthcoming. JOSE WILLIS, APRN - CNP      *Note was creating using voice recognition software.   The document was reviewed however grammatical errors may exist.

## 2022-08-12 LAB
BACTERIA, URINE: ABNORMAL
BILIRUBIN: NEGATIVE
BLOOD: NEGATIVE
CLARITY: CLEAR
COLOR: YELLOW
EPITHELIAL CELLS, UA: ABNORMAL
GLUCOSE: NEGATIVE
KETONES: NEGATIVE
LEUKOCYTE ESTERASE, URINE: ABNORMAL
NITRITE, URINE: NEGATIVE
PH, URINE: 8 (ref 4.5–8)
PROTEIN UA: ABNORMAL
SPECIFIC GRAVITY UA: 1.02 (ref 1–1.03)
TRIPLE PHOSPHATE CRYSTALS: ABNORMAL
UROBILINOGEN, URINE: 1 E.U./DL (ref 0–1)
WBC URINE: ABNORMAL WBC/HPF (ref 0–5)

## 2022-08-31 DIAGNOSIS — I42.8 CARDIOMYOPATHY, NONISCHEMIC (HCC): ICD-10-CM

## 2022-08-31 DIAGNOSIS — E13.9 DIABETES 1.5, MANAGED AS TYPE 2 (HCC): ICD-10-CM

## 2022-08-31 RX ORDER — OMEPRAZOLE 20 MG/1
20 CAPSULE, DELAYED RELEASE ORAL
Qty: 31 CAPSULE | Refills: 5 | Status: SHIPPED | OUTPATIENT
Start: 2022-08-31 | End: 2022-10-01

## 2022-08-31 RX ORDER — POTASSIUM CHLORIDE 750 MG/1
10 TABLET, EXTENDED RELEASE ORAL 3 TIMES DAILY
Qty: 93 TABLET | Refills: 5 | Status: SHIPPED
Start: 2022-08-31 | End: 2022-10-18 | Stop reason: SDUPTHER

## 2022-09-06 ENCOUNTER — OUTSIDE SERVICES (OUTPATIENT)
Dept: FAMILY MEDICINE CLINIC | Age: 87
End: 2022-09-06
Payer: MEDICARE

## 2022-09-06 DIAGNOSIS — R63.39: Primary | ICD-10-CM

## 2022-09-06 DIAGNOSIS — F41.9 ANXIETY: ICD-10-CM

## 2022-09-06 DIAGNOSIS — I25.10 CORONARY ARTERY DISEASE INVOLVING NATIVE CORONARY ARTERY OF NATIVE HEART WITHOUT ANGINA PECTORIS: ICD-10-CM

## 2022-09-06 DIAGNOSIS — G30.1 LATE ONSET ALZHEIMER'S DISEASE WITH BEHAVIORAL DISTURBANCE (HCC): ICD-10-CM

## 2022-09-06 DIAGNOSIS — F02.818 LATE ONSET ALZHEIMER'S DISEASE WITH BEHAVIORAL DISTURBANCE (HCC): ICD-10-CM

## 2022-09-06 DIAGNOSIS — R53.1 WEAKNESS: ICD-10-CM

## 2022-09-07 NOTE — PROGRESS NOTES
9/6/2022    [address]  9/21/1932    This resident is being seen today for an acute evaluation visit. She is a resident who has long-term medical conditions including CPK pending. failure to thrive, arteriosclerotic heart disease, COVID-19, GERD, diabetes mellitus, dementia, Alzheimer's, pelvic fracture. She is a 80 y.o. female resident who is being seen today for an acute evaluation with resident currently under hospice services with staffing concerns regarding  decline with inability to feed herself. She furthermore has apparently had difficulty with respect to her medications as well. Staff has concerns given the fact that she declined rather rapidly. Hospice was present during my evaluation and did admit that the resident has been notably weak. She does remain relatively emotional and does ask the same questions repeatedly. She denies any complaints regarding any pain, headaches or dizziness, sore throat, chest pain, coughing or shortness of breath, nausea or vomiting, constipation or diarrhea, dysuria or frequency, fever or chills, falls or syncopal events. Medications:  Donepezil 10 mg at bedtime  Escitalopram 50 milligrams daily   Lorazepam 0.5 mg 3 times daily  Potassium chloride 10 mEq 3 times daily  Albuterol as needed  MPAP 325 mg with 2 tablets daily as needed  Zoloft 50 mg daily        Objective     Vital Signs: Blood pressure 131/70 pulse 72 respirations 16 temperature 97.8 weight 151 pounds       Physical examination:Skin is essentially warm and dry. HEENT  unremarkable. Neck is supple. Heart regular rate and rhythm. No rubs, gallops or murmurs noted. Lungs are consistent with some degree of slight crackles with no evidence of wheezing at this time. Abdomen is soft, supple and non-tender. Bowel sounds are noted x4 quadrants. No rigidity, guarding or rebound tenderness. Negative Gutierrez's, negative McBurney's, negative Luis's. Extremities; mild to 1+ edema.   Pulses are adequate. No clubbing  or no cyanosis noted. Neurologically she  is alert and oriented x2. No evidence of paralysis or paresthesias noted. Diagnoses and all orders for this visit:    Unable to feed self  Comments:  Seen in conjunction with hospice services and we will continue with close observation    Weakness  Comments:  Maintain hospice with palliative care    Anxiety  Comments:  Controlled with lorazepam as needed    Late onset Alzheimer's disease with behavioral disturbance (HCC)  Comments:  Stable with donepezil    Coronary artery disease involving native coronary artery of native heart without angina pectoris  Comments:  Stable with hospice services              Plan:  Plan of care was discussed with the healthcare team with medications and labs reviewed. There has been some notable decline since my previous evaluation with regards to this resident. After discussion with staffing and the concern regarding her medication administration, I am Veena recommend discontinuation of her metoprolol, omeprazole and aspirin. She does need to maintain the benefit of her lorazepam as needed due to her underlying anxiety with emotional outburst.  She will continue with her hospice recommendations with palliative care and I will see her routinely and as needed with further orders forthcoming. JOSE WILLIS, APRN - CNP      *Note was creating using voice recognition software.   The document was reviewed however grammatical errors may exist.

## 2022-09-29 DIAGNOSIS — F02.818 LATE ONSET ALZHEIMER'S DISEASE WITH BEHAVIORAL DISTURBANCE (HCC): ICD-10-CM

## 2022-09-29 DIAGNOSIS — G30.1 LATE ONSET ALZHEIMER'S DISEASE WITH BEHAVIORAL DISTURBANCE (HCC): ICD-10-CM

## 2022-09-29 DIAGNOSIS — F43.21 GRIEF REACTION: ICD-10-CM

## 2022-09-29 RX ORDER — LORAZEPAM 0.5 MG/1
0.5 TABLET ORAL 4 TIMES DAILY
Qty: 120 TABLET | Refills: 2 | Status: SHIPPED
Start: 2022-09-29 | End: 2022-10-18 | Stop reason: SDUPTHER

## 2022-09-29 RX ORDER — DONEPEZIL HYDROCHLORIDE 10 MG/1
10 TABLET, FILM COATED ORAL NIGHTLY
Qty: 31 TABLET | Refills: 5 | Status: SHIPPED
Start: 2022-09-29 | End: 2022-10-18 | Stop reason: SDUPTHER

## 2022-09-30 LAB
BACTERIA, URINE: ABNORMAL
BILIRUBIN: NEGATIVE
BLOOD: NEGATIVE
CLARITY: CLEAR
COLOR: YELLOW
EPITHELIAL CELLS, UA: ABNORMAL
GLUCOSE: NEGATIVE
KETONES: NEGATIVE
LEUKOCYTE ESTERASE, URINE: NEGATIVE
NITRITE, URINE: NEGATIVE
PH, URINE: 5 (ref 4.5–8)
PROTEIN UA: ABNORMAL
SPECIFIC GRAVITY UA: 1.01 (ref 1–1.03)
UROBILINOGEN, URINE: 0.2 E.U./DL (ref 0–1)
WBC URINE: ABNORMAL WBC/HPF (ref 0–5)

## 2022-10-03 LAB — URINE CULTURE, ROUTINE: NORMAL

## 2022-10-18 DIAGNOSIS — E13.9 DIABETES 1.5, MANAGED AS TYPE 2 (HCC): ICD-10-CM

## 2022-10-18 DIAGNOSIS — F43.21 GRIEF REACTION: ICD-10-CM

## 2022-10-18 DIAGNOSIS — I42.8 CARDIOMYOPATHY, NONISCHEMIC (HCC): ICD-10-CM

## 2022-10-18 DIAGNOSIS — G30.1 LATE ONSET ALZHEIMER'S DISEASE WITH BEHAVIORAL DISTURBANCE (HCC): ICD-10-CM

## 2022-10-18 DIAGNOSIS — F02.818 LATE ONSET ALZHEIMER'S DISEASE WITH BEHAVIORAL DISTURBANCE (HCC): ICD-10-CM

## 2022-10-18 PROBLEM — M13.80 OTHER SPECIFIED ARTHRITIS, UNSPECIFIED SITE: Status: ACTIVE | Noted: 2021-06-16

## 2022-10-18 PROBLEM — R26.2 DIFFICULTY IN WALKING, NOT ELSEWHERE CLASSIFIED: Status: ACTIVE | Noted: 2021-06-17

## 2022-10-18 PROBLEM — E11.42 DIABETIC PERIPHERAL NEUROPATHY ASSOCIATED WITH TYPE 2 DIABETES MELLITUS (HCC): Status: ACTIVE | Noted: 2022-10-18

## 2022-10-18 PROBLEM — N17.9 ACUTE KIDNEY FAILURE, UNSPECIFIED (HCC): Status: ACTIVE | Noted: 2021-06-16

## 2022-10-18 PROBLEM — R63.0 ANOREXIA: Status: ACTIVE | Noted: 2021-06-16

## 2022-10-18 PROBLEM — K21.9 GASTRO-ESOPHAGEAL REFLUX DISEASE WITHOUT ESOPHAGITIS: Status: ACTIVE | Noted: 2021-06-16

## 2022-10-18 PROBLEM — R41.841 COGNITIVE COMMUNICATION DEFICIT: Status: ACTIVE | Noted: 2021-06-17

## 2022-10-18 PROBLEM — N28.1 CYST OF KIDNEY, ACQUIRED: Status: ACTIVE | Noted: 2021-06-16

## 2022-10-18 PROBLEM — R13.12 DYSPHAGIA, OROPHARYNGEAL PHASE: Status: ACTIVE | Noted: 2021-06-17

## 2022-10-18 PROBLEM — K57.90 DIVERTICULOSIS OF INTESTINE, PART UNSPECIFIED, WITHOUT PERFORATION OR ABSCESS WITHOUT BLEEDING: Status: ACTIVE | Noted: 2021-06-16

## 2022-10-18 RX ORDER — HALOPERIDOL 5 MG/ML
INJECTION INTRAMUSCULAR
COMMUNITY
Start: 2022-10-12 | End: 2022-10-18 | Stop reason: SDUPTHER

## 2022-10-18 RX ORDER — HYDROCODONE BITARTRATE AND ACETAMINOPHEN 5; 325 MG/1; MG/1
1 TABLET ORAL EVERY 8 HOURS PRN
Qty: 42 TABLET | Refills: 0 | Status: SHIPPED | OUTPATIENT
Start: 2022-10-18 | End: 2022-11-17

## 2022-10-18 RX ORDER — SERTRALINE HYDROCHLORIDE 25 MG/1
TABLET, FILM COATED ORAL
COMMUNITY
Start: 2022-08-03 | End: 2022-10-18

## 2022-10-18 RX ORDER — DIVALPROEX SODIUM 125 MG/1
CAPSULE, COATED PELLETS ORAL
COMMUNITY
Start: 2022-10-03 | End: 2022-10-18

## 2022-10-18 RX ORDER — LORAZEPAM 0.5 MG/1
0.5 TABLET ORAL 4 TIMES DAILY
Qty: 120 TABLET | Refills: 5 | Status: SHIPPED | OUTPATIENT
Start: 2022-10-18 | End: 2023-04-16

## 2022-10-18 RX ORDER — DONEPEZIL HYDROCHLORIDE 10 MG/1
10 TABLET, FILM COATED ORAL NIGHTLY
Qty: 30 TABLET | Refills: 5 | Status: SHIPPED | OUTPATIENT
Start: 2022-10-18 | End: 2023-04-16

## 2022-10-18 RX ORDER — DIVALPROEX SODIUM 250 MG/1
250 TABLET, EXTENDED RELEASE ORAL 3 TIMES DAILY
Qty: 90 TABLET | Refills: 5 | Status: SHIPPED | OUTPATIENT
Start: 2022-10-18 | End: 2023-04-16

## 2022-10-18 RX ORDER — ACETAMINOPHEN 325 MG/1
650 TABLET ORAL EVERY 6 HOURS PRN
Qty: 120 TABLET | Refills: 3 | Status: SHIPPED | OUTPATIENT
Start: 2022-10-18

## 2022-10-18 RX ORDER — HALOPERIDOL 5 MG/ML
1 INJECTION INTRAMUSCULAR EVERY 6 HOURS PRN
Qty: 2 ML | Refills: 2 | Status: SHIPPED | OUTPATIENT
Start: 2022-10-18 | End: 2022-11-17

## 2022-10-18 RX ORDER — POTASSIUM CHLORIDE 750 MG/1
10 TABLET, EXTENDED RELEASE ORAL 3 TIMES DAILY
Qty: 90 TABLET | Refills: 5 | Status: SHIPPED | OUTPATIENT
Start: 2022-10-18 | End: 2023-04-16

## 2022-10-18 RX ORDER — FUROSEMIDE 20 MG/1
20 TABLET ORAL DAILY PRN
Qty: 30 TABLET | Refills: 5 | Status: SHIPPED | OUTPATIENT
Start: 2022-10-18 | End: 2022-11-17

## 2022-10-18 RX ORDER — ATROPINE SULFATE 10 MG/ML
SOLUTION/ DROPS OPHTHALMIC
Qty: 10 ML | Refills: 1 | Status: SHIPPED | OUTPATIENT
Start: 2022-10-18

## 2022-10-18 RX ORDER — TOBRAMYCIN 3 MG/ML
SOLUTION/ DROPS OPHTHALMIC
COMMUNITY
Start: 2022-08-17

## 2022-10-18 RX ORDER — SERTRALINE HYDROCHLORIDE 100 MG/1
150 TABLET, FILM COATED ORAL DAILY
Qty: 45 TABLET | Refills: 5 | Status: SHIPPED | OUTPATIENT
Start: 2022-10-18 | End: 2023-04-16

## 2022-10-18 RX ORDER — MORPHINE SULFATE 100 MG/5ML
5 SOLUTION ORAL EVERY 4 HOURS PRN
Qty: 30 ML | Refills: 0 | Status: SHIPPED | OUTPATIENT
Start: 2022-10-18 | End: 2022-11-17

## 2022-10-21 DIAGNOSIS — F43.21 GRIEF REACTION: ICD-10-CM

## 2022-10-21 RX ORDER — DIVALPROEX SODIUM 250 MG/1
500 TABLET, EXTENDED RELEASE ORAL 3 TIMES DAILY
Qty: 180 TABLET | Refills: 5 | OUTPATIENT
Start: 2022-10-21 | End: 2022-11-20

## 2022-10-21 RX ORDER — LORAZEPAM 0.5 MG/1
0.5 TABLET ORAL 3 TIMES DAILY
Qty: 90 TABLET | Refills: 2 | OUTPATIENT
Start: 2022-10-21 | End: 2022-11-20

## 2022-11-16 ENCOUNTER — OUTSIDE SERVICES (OUTPATIENT)
Dept: FAMILY MEDICINE CLINIC | Age: 87
End: 2022-11-16
Payer: MEDICARE

## 2022-11-16 DIAGNOSIS — R19.7 DIARRHEA, UNSPECIFIED TYPE: Primary | ICD-10-CM

## 2022-11-16 DIAGNOSIS — F32.A DEPRESSION, UNSPECIFIED DEPRESSION TYPE: ICD-10-CM

## 2022-11-16 DIAGNOSIS — I50.9 CONGESTIVE HEART FAILURE, UNSPECIFIED HF CHRONICITY, UNSPECIFIED HEART FAILURE TYPE (HCC): ICD-10-CM

## 2022-11-16 DIAGNOSIS — F02.818 LATE ONSET ALZHEIMER'S DISEASE WITH BEHAVIORAL DISTURBANCE (HCC): ICD-10-CM

## 2022-11-16 DIAGNOSIS — I25.10 CORONARY ARTERY DISEASE INVOLVING NATIVE CORONARY ARTERY OF NATIVE HEART WITHOUT ANGINA PECTORIS: ICD-10-CM

## 2022-11-16 DIAGNOSIS — G30.1 LATE ONSET ALZHEIMER'S DISEASE WITH BEHAVIORAL DISTURBANCE (HCC): ICD-10-CM

## 2022-11-16 NOTE — PROGRESS NOTES
11/16/2022    [address]  9/21/1932    This resident is being seen today for an acute evaluation visit. She is a resident who has long-term medical conditions including CPK pending. failure to thrive, arteriosclerotic heart disease, COVID-19, GERD, diabetes mellitus, dementia, Alzheimer's, pelvic fracture. She is a 80 y.o. female resident who is being seen today for an acute evaluation with staff indicating that this resident has \"frequent diarrhea. \"  It is of note to mention that the resident actually had a bowel movement prior to me coming into the room and it was a formed stool. The staff present at the time of the evaluation had indicated that she was not aware of any diarrhea. I do feel that this is most likely consistent with episodic diarrhea. Resident does reside in our memory care unit and is currently in no acute distress without complaints of pain, headaches or dizziness, sore throat, chest pain, coughing or shortness of breath, nausea or vomiting, constipation or diarrhea, dysuria or frequency, fever or chills, falls or syncopal events. Medications:  Donepezil 10 mg at bedtime  Escitalopram 50 milligrams daily   Lorazepam 0.5 mg 3 times daily  Potassium chloride 10 mEq 3 times daily  Albuterol as needed  MPAP 325 mg with 2 tablets daily as needed  Zoloft 50 mg daily        Objective     Vital Signs: Blood pressure 131/70 pulse 72 respirations 17 temperature 98 weight 151 pounds       Physical examination:Skin is essentially warm and dry. HEENT  unremarkable. Neck is supple. Heart regular rate and rhythm. No rubs, gallops or murmurs noted. Lungs are consistent with some degree of slight crackles with no evidence of wheezing at this time. Abdomen is soft, supple and non-tender. Bowel sounds are noted x4 quadrants. No rigidity, guarding or rebound tenderness. Negative Gutierrez's, negative McBurney's, negative Luis's. Extremities; mild to edema to the bilateral lower extremities. Pulses are adequate. No clubbing  or no cyanosis noted. Neurologically she  is alert and oriented x2. No evidence of paralysis or paresthesias noted. Diagnoses and all orders for this visit:    Diarrhea, unspecified type  Comments:  Staff to utilize standing orders with the benefit of Imodium as needed    Coronary artery disease involving native coronary artery of native heart without angina pectoris  Comments:  Currently stable    Depression, unspecified depression type  Comments:  Currently controlled with escitalopram    Late onset Alzheimer's disease with behavioral disturbance ([facility] [address]  Comments:  Stable with donepezil    Congestive heart failure, unspecified HF chronicity, unspecified heart failure type ([facility] [address]  Comments:  Currently stable and asymptomatic                Plan:  Plan of care was discussed with the healthcare team with medications and labs reviewed. As stated, I am not sure that this resident has having frequent diarrhea, but may be episodic diarrhea. I did speak with nursing staff regarding this and they are aware of the standing orders, which do include Imodium as needed. They did indicate that they would utilize this as opposed to giving her something routinely. I do not want to cause any constipation in this regard. She is otherwise stable at this time and will continue with her current plan of care and I will see her routinely and as needed with further orders forthcoming. JOSE WILLIS, APRN - CNP      *Note was creating using voice recognition software.   The document was reviewed however grammatical errors may exist.

## 2023-01-03 ENCOUNTER — OUTSIDE SERVICES (OUTPATIENT)
Dept: FAMILY MEDICINE CLINIC | Age: 88
End: 2023-01-03

## 2023-01-03 DIAGNOSIS — M15.9 PRIMARY OSTEOARTHRITIS INVOLVING MULTIPLE JOINTS: ICD-10-CM

## 2023-01-03 DIAGNOSIS — S51.812A SKIN TEAR OF LEFT FOREARM WITHOUT COMPLICATION, INITIAL ENCOUNTER: Primary | ICD-10-CM

## 2023-01-03 DIAGNOSIS — S51.811A SKIN TEAR OF RIGHT FOREARM WITHOUT COMPLICATION, INITIAL ENCOUNTER: ICD-10-CM

## 2023-01-03 DIAGNOSIS — K21.00 GASTROESOPHAGEAL REFLUX DISEASE WITH ESOPHAGITIS WITHOUT HEMORRHAGE: ICD-10-CM

## 2023-01-03 DIAGNOSIS — G30.1 LATE ONSET ALZHEIMER'S DISEASE WITH BEHAVIORAL DISTURBANCE (HCC): ICD-10-CM

## 2023-01-03 DIAGNOSIS — F02.818 LATE ONSET ALZHEIMER'S DISEASE WITH BEHAVIORAL DISTURBANCE (HCC): ICD-10-CM

## 2023-01-04 NOTE — PROGRESS NOTES
1/3/2023    [address]  9/21/1932    This resident is being seen today for an acute evaluation visit. She is a resident who has long-term medical conditions including CPK pending. failure to thrive, arteriosclerotic heart disease, COVID-19, GERD, diabetes mellitus, dementia, Alzheimer's, pelvic fracture. She is a 80 y.o. female resident who is being seen today for an acute evaluation regarding skin tears to the bilateral arms. I did see her in conjunction with hospice and these injuries were felt to be secondary to the fact that the resident was going to fall and staffing had attempted to assist her, which did cause the skin tears. This resident does remain responsive to verbal tactile stimuli with baseline confusion and resides in her memory care unit. She was in no acute distress on today's evaluation without complaints of pain, headaches or dizziness, sore throat, chest pain, coughing or shortness of breath, nausea or vomiting, constipation or diarrhea, dysuria or frequency, fever or chills, falls or syncopal events. Medications:  Donepezil 10 mg at bedtime  Escitalopram 50 milligrams daily   Lorazepam 0.5 mg 3 times daily  Potassium chloride 10 mEq 3 times daily  Albuterol as needed  MPAP 325 mg with 2 tablets daily as needed  Zoloft 50 mg daily        Objective     Vital Signs: Blood pressure 131/70 pulse 72 respirations 17 temperature 98 weight 151 pounds       Physical examination:Skin is essentially warm and dry. She did have evidence of a rather large skin tear to the right extremity without infectious etiology and a small skin tear to the left arm. HEENT  unremarkable. Neck is supple. Heart regular rate and rhythm. No rubs, gallops or murmurs noted. Lungs are consistent with some degree of slight crackles with no evidence of wheezing at this time. Abdomen is soft, supple and non-tender. Bowel sounds are noted x4 quadrants. No rigidity, guarding or rebound tenderness.   Negative Gutierrez's, negative McBurney's, negative Luis's. Extremities; mild to edema to the bilateral lower extremities. Pulses are adequate. No clubbing  or no cyanosis noted. Neurologically she  is alert and oriented x2. No evidence of paralysis or paresthesias noted. Diagnoses and all orders for this visit:    Skin tear of left forearm without complication, initial encounter  Comments:  Seen in conjunction with hospice with recommendations to cleanse with saline and apply dressing 2 times per week    Skin tear of right forearm without complication, initial encounter  Comments:  Seen in conjunction with hospice with recommendations to cleanse and apply dressing 3 times per week    Primary osteoarthritis involving multiple joints  Comments:  Stable    Gastroesophageal reflux disease with esophagitis without hemorrhage  Comments:  Currently controlled    Late onset Alzheimer's disease with behavioral disturbance ([facility] [address]  Comments:  Stable with donepezil                  Plan:  Plan of care was discussed with the healthcare team with medications and labs reviewed. I do not feel that she has any infectious etiology noted to either arm at this time. I did asked that they cleanse of the left extremity with saline and apply a dressing and change it 2 times per week at this time. The right upper extremity will need cleansing with application of a dressing 3 times a week and Dr. Salazar Drilling to follow-up on Saturday if she has any worsening. She will continue with her current management with the benefit of hospice services and I will see her routinely and as needed with further orders forthcoming. JOSE WILLIS, APRN - CNP      *Note was creating using voice recognition software.   The document was reviewed however grammatical errors may exist.

## 2023-01-11 DIAGNOSIS — F43.21 GRIEF REACTION: ICD-10-CM

## 2023-01-11 RX ORDER — LORAZEPAM 0.5 MG/1
0.5 TABLET ORAL 3 TIMES DAILY
Qty: 90 TABLET | Refills: 0 | Status: SHIPPED | OUTPATIENT
Start: 2023-01-11 | End: 2023-02-10

## 2023-02-14 ENCOUNTER — OUTSIDE SERVICES (OUTPATIENT)
Dept: FAMILY MEDICINE CLINIC | Age: 88
End: 2023-02-14
Payer: MEDICARE

## 2023-02-14 DIAGNOSIS — F02.80 ALZHEIMER'S DEMENTIA, UNSPECIFIED DEMENTIA SEVERITY, UNSPECIFIED TIMING OF DEMENTIA ONSET, UNSPECIFIED WHETHER BEHAVIORAL, PSYCHOTIC, OR MOOD DISTURBANCE OR ANXIETY (HCC): ICD-10-CM

## 2023-02-14 DIAGNOSIS — R63.39: Primary | ICD-10-CM

## 2023-02-14 DIAGNOSIS — E11.65 TYPE 2 DIABETES MELLITUS WITH HYPERGLYCEMIA, WITHOUT LONG-TERM CURRENT USE OF INSULIN (HCC): ICD-10-CM

## 2023-02-14 DIAGNOSIS — I50.9 CONGESTIVE HEART FAILURE, UNSPECIFIED HF CHRONICITY, UNSPECIFIED HEART FAILURE TYPE (HCC): ICD-10-CM

## 2023-02-14 DIAGNOSIS — G30.9 ALZHEIMER'S DEMENTIA, UNSPECIFIED DEMENTIA SEVERITY, UNSPECIFIED TIMING OF DEMENTIA ONSET, UNSPECIFIED WHETHER BEHAVIORAL, PSYCHOTIC, OR MOOD DISTURBANCE OR ANXIETY (HCC): ICD-10-CM

## 2023-02-14 DIAGNOSIS — E44.1 MILD PROTEIN-CALORIE MALNUTRITION (HCC): ICD-10-CM

## 2023-02-14 PROCEDURE — 99349 HOME/RES VST EST MOD MDM 40: CPT | Performed by: NURSE PRACTITIONER

## 2023-02-14 NOTE — PROGRESS NOTES
2/14/2023    [address]  9/21/1932    This resident is being seen today for an acute evaluation visit. She is a resident who has long-term medical conditions including CPK pending. failure to thrive, arteriosclerotic heart disease, COVID-19, GERD, diabetes mellitus, dementia, Alzheimer's, pelvic fracture. She is a 80 y.o. female resident who is being seen today for an acute visit per request of staff with hospice request for n.p.o. status due to decline. Staff members did indicate that last Thursday, they did feel that she was possibly not going to make it through the weekend. She had been doing extremely poorly. However, she has improved and she is talking and eating and doing extremely well for her baseline status. She was in no acute distress at the time my evaluation and denied complaints. There was no evidence of pain, headaches or dizziness, sore throat, chest pain, coughing or shortness of breath, nausea or vomiting, constipation or diarrhea, dysuria or frequency, fever or chills, falls or syncopal events. Medications:  Donepezil 10 mg at bedtime  Escitalopram 50 milligrams daily   Lorazepam 0.5 mg 3 times daily  Potassium chloride 10 mEq 3 times daily  Albuterol as needed  MPAP 325 mg with 2 tablets daily as needed  Zoloft 50 mg daily        Objective     Vital Signs: Blood pressure 131/70 pulse 72 respirations 17 temperature 98 weight 151 pounds       Physical examination:Skin is essentially warm and dry. HEENT unremarkable. Neck is supple. Heart regular rate and rhythm. No rubs, gallops or murmurs noted. Lungs are consistent with some degree of slight crackles with no evidence of wheezing at this time. Abdomen is soft, supple and non-tender. Bowel sounds are noted x4 quadrants. No rigidity, guarding or rebound tenderness. Negative Gutierrez's, negative McBurney's, negative Luis's. Extremities; mild to edema to the bilateral lower extremities. Pulses are adequate.  No clubbing or no cyanosis noted. Neurologically she  is alert and oriented x2. No evidence of paralysis or paresthesias noted. Diagnoses and all orders for this visit:    Unable to feed self  Comments:  Staff to continue to feed a soft diet. No recommendations at this time for n.p.o. status. Maintain hospice services. Mild protein-calorie malnutrition ([facility] [address]  Comments:  Seen in conjunction with hospice services with recommendations for palliative care    Congestive heart failure, unspecified HF chronicity, unspecified heart failure type ([facility] [address]  Comments:  Currently controlled and asymptomatic    Type 2 diabetes mellitus with hyperglycemia, without long-term current use of insulin (HCC)  Comments:  Stable with no further recommendations for blood sugar Accu-Cheks    Alzheimer's dementia, unspecified dementia severity, unspecified timing of dementia onset, unspecified whether behavioral, psychotic, or mood disturbance or anxiety ([facility] [address]  Comments:  Maintain donepezil with Zoloft                    Plan:  Plan of care was discussed with the healthcare team with medications and labs reviewed. As stated, this resident has definitely improved over the course of the past several days. I do not feel that there is any need for nothing by mouth recommendation. Staff indicates that she has been eating and eating well and they have not noticed any difficulty with chewing or swallowing. She has not exhibited any increase in cough while eating. She will therefore continue with her current plan of care in terms of her soft diet with recommendations for palliative care measures and I will continue to see her routinely and as needed with further orders forthcoming. JOSE WILLIS, APRN - CNP      *Note was creating using voice recognition software.   The document was reviewed however grammatical errors may exist.

## 2023-03-07 ENCOUNTER — OUTSIDE SERVICES (OUTPATIENT)
Dept: FAMILY MEDICINE CLINIC | Age: 88
End: 2023-03-07
Payer: MEDICARE

## 2023-03-07 DIAGNOSIS — E11.51 TYPE 2 DIABETES MELLITUS WITH PERIPHERAL VASCULAR DISEASE (HCC): ICD-10-CM

## 2023-03-07 DIAGNOSIS — R23.8 SKIN BREAKDOWN: Primary | ICD-10-CM

## 2023-03-07 DIAGNOSIS — K21.00 GASTROESOPHAGEAL REFLUX DISEASE WITH ESOPHAGITIS WITHOUT HEMORRHAGE: ICD-10-CM

## 2023-03-07 DIAGNOSIS — I25.10 CORONARY ARTERY DISEASE INVOLVING NATIVE CORONARY ARTERY OF NATIVE HEART WITHOUT ANGINA PECTORIS: ICD-10-CM

## 2023-03-07 DIAGNOSIS — F02.818 DEMENTIA ASSOCIATED WITH OTHER UNDERLYING DISEASE WITH BEHAVIORAL DISTURBANCE: ICD-10-CM

## 2023-03-07 PROCEDURE — 99349 HOME/RES VST EST MOD MDM 40: CPT | Performed by: NURSE PRACTITIONER

## 2023-03-07 NOTE — PROGRESS NOTES
3/7/2023    [address]  1932    This resident is being seen today for an acute evaluation visit. She is a resident who has long-term medical conditions including CPK pending. failure to thrive, arteriosclerotic heart disease, COVID-19, GERD, diabetes mellitus, dementia, Alzheimer's, pelvic fracture. She is a 80 y.o. female resident who is being seen today for an acute evaluation per the request of staff secondary to concern regarding an open area of her buttocks. It is of note to mention that this is a resident who is currently seen in conjunction with hospice services and does maintain the benefit of palliative care. She resides in a memory care unit with which she does require increased assistance. She is responsive to verbal and tactile stimuli with baseline confusion. She has no evidence of coughing or shortness of breath, chest pain, nausea or vomiting, constipation or diarrhea, dysuria or frequency, fever or chills, falls or syncopal events. Medications:  Lorazepam 0.5 mg every 6 hours as needed  Albuterol as needed  MPAP 325 mg with 2 tablets daily as needed          Objective     Vital Signs: Blood pressure 131/70 pulse 72 respirations 17 temperature 98 weight 151 pounds       Physical examination:Skin is essentially warm and dry. She does have a slightly open area noted to her coccyx/ cleft region. HEENT unremarkable. Neck is supple. Heart regular rate and rhythm. No rubs, gallops or murmurs noted. Lungs are consistent with some degree of slight crackles with no evidence of wheezing at this time. Abdomen is soft, supple and non-tender. Bowel sounds are noted x4 quadrants. No rigidity, guarding or rebound tenderness. Negative Gutierrez's, negative McBurney's, negative Luis's. Extremities; mild to edema to the bilateral lower extremities. Pulses are adequate. No clubbing  or no cyanosis noted. Neurologically she  is alert and oriented x1.   No evidence of paralysis or paresthesias noted. Diagnoses and all orders for this visit:    Skin breakdown  Comments:  Cleanse daily and apply Medihoney with protective barrier dressing    Dementia associated with other underlying disease with behavioral disturbance  Comments:  Resides in the memory care unit with discontinuation of Aricept due to noncompliance    Type 2 diabetes mellitus with peripheral vascular disease ([facility] [address]  Comments:  Stable    Gastroesophageal reflux disease with esophagitis without hemorrhage  Comments:  Currently controlled    Coronary artery disease involving native coronary artery of native heart without angina pectoris  Comments:  Controlled at this time does maintain palliative care                      Plan:  Plan of care was discussed with the healthcare team with medications and labs reviewed. Given the fact that she does have some breakdown noted to her  cleft, I will recommend that the area be cleansed daily with soap and water with application of Medihoney with a protective barrier dressing, which will be changed daily and as needed until resolved. Given the fact that she has had a definite change in her mental state and physical state and that she has been refusing medications, I am going to recommend discontinuation of her divalproex sodium, donepezil, sertraline, routine lorazepam and haloperidol. She can continue with her as needed lorazepam along with her morphine as needed. She will furthermore continue with the benefit of hospice services and I will see her routinely and as needed with further orders forthcoming. JOSE WILLIS, APRN - CNP      *Note was creating using voice recognition software.   The document was reviewed however grammatical errors may exist.

## 2023-06-21 NOTE — PROGRESS NOTES
5/17/2022    [address]  9/21/1932    This resident is being seen today for an acute evaluation visit. She is a resident who has long-term medical conditions including CPK pending. failure to thrive, arteriosclerotic heart disease, COVID-19, GERD, diabetes mellitus, dementia, Alzheimer's, pelvic fracture. She is a 80 y.o. female resident who is being seen today for an acute evaluation visit with resident returning from the hospital on 5/4/2022. She was diagnosed at that time with what appeared to be an underlying urinary tract infection, after being sent to the hospital with increased confusion. The concern at this time is that she has had some degree of a moist cough over the course of the past 24 to 72 hours. Staff also indicates that her metformin was stopped in the hospital setting and she was placed on insulin coverage and her blood pressure has been running anywhere from 92-1 30. She does reside here in the memory care unit and has evidence of confusion with an obvious cough. She had no expectoration noted. She has not had any complaints of chest pain, nausea or vomiting, constipation or diarrhea, dysuria or frequency, fever or chills, falls or syncopal events. Medications:  Acetaminophen 650 mg twice daily  Aspirin 81 mg daily  Donepezil 10 mg at bedtime  Escitalopram 15 milligrams daily   Lorazepam 0.5 mg twice daily  Nebivolol 7.5 mg 3 tablets daily   Omeprazole 20 mg daily  Potassium chloride 10 mEq 3 times daily  Vitamin B12 1000 mcg weekly  Vitamin D3 2000 units daily iron  Albuterol as needed  MPAP 325 mg with 2 tablets daily as needed      Objective     Vital Signs: Blood pressure 117/74 pulse 80 respirations 18 temperature 97.6 weight 151 pounds     Physical examination:Skin is essentially warm and dry. HEENT  unremarkable. Neck is supple. Heart regular rate and rhythm. No rubs, gallops or murmurs noted.   Lungs are consistent with some degree of slight crackles with no evidence of wheezing at this time. Abdomen is soft, supple and non-tender. Bowel sounds are noted x4 quadrants. No rigidity, guarding or rebound tenderness. Negative Gutierrez's, negative McBurney's, negative Luis's. Extremities; mild to 1+ edema. Pulses are adequate. No clubbing  or no cyanosis noted. Neurologically she  is alert and oriented x2. No evidence of paralysis or paresthesias noted. Diagnoses and all orders for this visit:    Cough  Comments:  Obtain chest x-ray and initiate DuoNeb breathing treatments 3 times a day for 10 days    Urinary tract infection without hematuria, site unspecified  Comments:  Status post hospitalization with antibiotic treatment without further sequela    Type 2 diabetes mellitus with hyperglycemia, without long-term current use of insulin ([facility] [address]  Comments:  Status post discontinuation of metformin with recommendations to maintain blood sugar Accu-Cheks fasting and 4 PM on Monday, Wednesday and Friday    Coronary artery disease involving native coronary artery of native heart without angina pectoris  Comments:  Maintain low-dose aspirin with lipid and blood pressure management    Gastroesophageal reflux disease with esophagitis without hemorrhage  Comments:  Currently controlled with omeprazole          Plan:  Plan of care was discussed with the healthcare team with medications and labs reviewed. Given the fact that this resident does have some degree of underlying cough, I am Veena recommend chest x-ray at this time along with initiation of DuoNeb breathing treatments 3 times a day for the course of the next 10 days. I will furthermore discontinue her sliding scale coverage and asked that staff monitor her blood sugar Accu-Cheks fasting and 4 PM on Monday, Wednesday and Friday for the course of the next month and then reevaluate at that time.   She will furthermore continue with her current management as otherwise stated and I will see her routinely and as needed with further orders forthcoming. JOSE WILLIS, APRN - CNP      *Note was creating using voice recognition software.   The document was reviewed however grammatical errors may exist. Detail Level: Detailed

## 2023-08-30 NOTE — ED NOTES
Bed: 26  Expected date:   Expected time:   Means of arrival:   Comments:  ems     Aster Saenz, RN  12/22/21 0810
no